# Patient Record
Sex: FEMALE | Race: BLACK OR AFRICAN AMERICAN | NOT HISPANIC OR LATINO
[De-identification: names, ages, dates, MRNs, and addresses within clinical notes are randomized per-mention and may not be internally consistent; named-entity substitution may affect disease eponyms.]

---

## 2017-03-09 ENCOUNTER — APPOINTMENT (OUTPATIENT)
Dept: OPHTHALMOLOGY | Facility: CLINIC | Age: 82
End: 2017-03-09

## 2017-09-28 ENCOUNTER — APPOINTMENT (OUTPATIENT)
Dept: OPHTHALMOLOGY | Facility: CLINIC | Age: 82
End: 2017-09-28
Payer: MEDICARE

## 2017-09-28 PROCEDURE — 92250 FUNDUS PHOTOGRAPHY W/I&R: CPT

## 2017-09-28 PROCEDURE — 92083 EXTENDED VISUAL FIELD XM: CPT

## 2017-09-28 PROCEDURE — 92014 COMPRE OPH EXAM EST PT 1/>: CPT

## 2017-09-28 PROCEDURE — 92226: CPT | Mod: LT

## 2017-09-28 PROCEDURE — 92020 GONIOSCOPY: CPT

## 2018-03-29 ENCOUNTER — APPOINTMENT (OUTPATIENT)
Dept: OPHTHALMOLOGY | Facility: CLINIC | Age: 83
End: 2018-03-29
Payer: MEDICARE

## 2018-03-29 DIAGNOSIS — H04.123 DRY EYE SYNDROME OF BILATERAL LACRIMAL GLANDS: ICD-10-CM

## 2018-03-29 PROCEDURE — 92012 INTRM OPH EXAM EST PATIENT: CPT

## 2018-03-29 PROCEDURE — 92133 CPTRZD OPH DX IMG PST SGM ON: CPT

## 2018-03-29 PROCEDURE — 92083 EXTENDED VISUAL FIELD XM: CPT

## 2018-09-27 ENCOUNTER — APPOINTMENT (OUTPATIENT)
Dept: OPHTHALMOLOGY | Facility: CLINIC | Age: 83
End: 2018-09-27
Payer: MEDICARE

## 2018-09-27 PROCEDURE — 92020 GONIOSCOPY: CPT

## 2018-09-27 PROCEDURE — 92250 FUNDUS PHOTOGRAPHY W/I&R: CPT

## 2018-09-27 PROCEDURE — 92014 COMPRE OPH EXAM EST PT 1/>: CPT

## 2018-09-27 PROCEDURE — 92226: CPT | Mod: RT

## 2018-09-27 PROCEDURE — 92083 EXTENDED VISUAL FIELD XM: CPT

## 2018-09-27 PROCEDURE — 92226: CPT | Mod: LT

## 2019-02-19 ENCOUNTER — EMERGENCY (EMERGENCY)
Facility: HOSPITAL | Age: 84
LOS: 1 days | Discharge: HOME | End: 2019-02-19
Admitting: EMERGENCY MEDICINE
Payer: MEDICARE

## 2019-02-19 VITALS
OXYGEN SATURATION: 96 % | TEMPERATURE: 98 F | WEIGHT: 115.08 LBS | SYSTOLIC BLOOD PRESSURE: 185 MMHG | DIASTOLIC BLOOD PRESSURE: 91 MMHG | RESPIRATION RATE: 16 BRPM | HEART RATE: 75 BPM

## 2019-02-19 VITALS — HEART RATE: 72 BPM | DIASTOLIC BLOOD PRESSURE: 96 MMHG | SYSTOLIC BLOOD PRESSURE: 228 MMHG

## 2019-02-19 PROCEDURE — 72125 CT NECK SPINE W/O DYE: CPT | Mod: 26

## 2019-02-19 PROCEDURE — 12011 RPR F/E/E/N/L/M 2.5 CM/<: CPT

## 2019-02-19 PROCEDURE — 70486 CT MAXILLOFACIAL W/O DYE: CPT | Mod: 26

## 2019-02-19 PROCEDURE — 99284 EMERGENCY DEPT VISIT MOD MDM: CPT | Mod: 25

## 2019-02-19 PROCEDURE — 70450 CT HEAD/BRAIN W/O DYE: CPT | Mod: 26

## 2019-02-19 RX ORDER — ACETAMINOPHEN 500 MG
650 TABLET ORAL ONCE
Qty: 0 | Refills: 0 | Status: COMPLETED | OUTPATIENT
Start: 2019-02-19 | End: 2019-02-19

## 2019-02-19 RX ORDER — CEFAZOLIN SODIUM 1 G
1000 VIAL (EA) INJECTION ONCE
Qty: 0 | Refills: 0 | Status: COMPLETED | OUTPATIENT
Start: 2019-02-19 | End: 2019-02-19

## 2019-02-19 RX ORDER — TETANUS TOXOID, REDUCED DIPHTHERIA TOXOID AND ACELLULAR PERTUSSIS VACCINE, ADSORBED 5; 2.5; 8; 8; 2.5 [IU]/.5ML; [IU]/.5ML; UG/.5ML; UG/.5ML; UG/.5ML
0.5 SUSPENSION INTRAMUSCULAR ONCE
Qty: 0 | Refills: 0 | Status: COMPLETED | OUTPATIENT
Start: 2019-02-19 | End: 2019-02-19

## 2019-02-19 RX ADMIN — Medication 100 MILLIGRAM(S): at 13:55

## 2019-02-19 RX ADMIN — TETANUS TOXOID, REDUCED DIPHTHERIA TOXOID AND ACELLULAR PERTUSSIS VACCINE, ADSORBED 0.5 MILLILITER(S): 5; 2.5; 8; 8; 2.5 SUSPENSION INTRAMUSCULAR at 13:55

## 2019-02-19 RX ADMIN — Medication 650 MILLIGRAM(S): at 14:34

## 2019-02-19 NOTE — ED PROVIDER NOTE - MUSCULOSKELETAL, MLM
Spine appears normal, range of motion is not limited, no muscle or joint tenderness Spine appears normal, range of motion is not limited, no muscle or joint tenderness. No midline tenderness, step off, or deformities.

## 2019-02-19 NOTE — ED PROVIDER NOTE - OBJECTIVE STATEMENT
82 y/o female with PMHx of HTN (taking atenolol) presents to the ED with complaints of laceration to lower lip s/p mechanical fall today. Pt states she was leaving the gym today when she stubbed her toe on the sidewalk and fell face-first. She is also c/o headache and jaw pain. She states she went to the dentist PTA and has a fracture to tooth #9. Tetanus is not UTD. She endorses seeing her PMD last week. No LOC, no head trauma, no visual changes, no vomiting, no neck pain, no back pain, no numbness/tinging/weakness.

## 2019-02-19 NOTE — ED PROCEDURE NOTE - CPROC ED LACER REPAIR DETAIL1
No foreign body small piece of tooth removed from lip./All foreign material was removed./The wound was explored to base in bloodless field.

## 2019-02-19 NOTE — ED PROVIDER NOTE - CHPI ED SYMPTOMS NEG
no LOC, no head trauma, no visual changes, no neck pain, no back pain, no numbness, no tingling, no weakness/no vomiting

## 2019-02-19 NOTE — ED PROVIDER NOTE - PROGRESS NOTE DETAILS
pt with elevated BP. denies chest pain, sob, palpitations, urinary symptoms, dizziness, vision changes. pt takes 50mg atenolol. recommend pt takes an extra dose tonight. pt has an appointment with her pmd tomorrow and will have her bp checked then.

## 2019-02-19 NOTE — ED PROVIDER NOTE - CLINICAL SUMMARY MEDICAL DECISION MAKING FREE TEXT BOX
Will obtain CT head, maxillofacial, and cervical spine. Will update tetanus and give Ancef as laceration is through and through. Will perform laceration repair.

## 2019-02-19 NOTE — ED PROVIDER NOTE - NEUROLOGICAL, MLM
Alert and oriented, no focal deficits, no motor or sensory deficits. Alert and oriented, no focal deficits, no motor or sensory deficits. Ambulatory.

## 2019-02-19 NOTE — ED ADULT NURSE NOTE - NSIMPLEMENTINTERV_GEN_ALL_ED
Implemented All Fall Risk Interventions:  [place] to call system. Call bell, personal items and telephone within reach. Instruct patient to call for assistance. Room bathroom lighting operational. Non-slip footwear when patient is off stretcher. Physically safe environment: no spills, clutter or unnecessary equipment. Stretcher in lowest position, wheels locked, appropriate side rails in place. Provide visual cue, wrist band, yellow gown, etc. Monitor gait and stability. Monitor for mental status changes and reorient to person, place, and time. Review medications for side effects contributing to fall risk. Reinforce activity limits and safety measures with patient and family.

## 2019-02-19 NOTE — ED PROVIDER NOTE - CARE PLAN
Principal Discharge DX:	Fall, initial encounter  Secondary Diagnosis:	Lip laceration, initial encounter

## 2019-02-19 NOTE — ED PROVIDER NOTE - SKIN, MLM
1.5cm laceration across lower lip, does not cross vermilion border. 1cm laceration beneath lower lip. Laceration is through and through.

## 2019-02-19 NOTE — ED PROVIDER NOTE - ENMT, MLM
Airway patent. Airway patent. Normal neck.   Head: Scalp without hematoma.   Jaw: UCF fracture to tooth #9. Normal bite strength. Normal ROM of jaw.

## 2019-02-19 NOTE — ED PROVIDER NOTE - NSFOLLOWUPINSTRUCTIONS_ED_ALL_ED_FT
FOLLOW UP WITH YOUR PMD IN 2-3 DAYS.     TAKE ANTIBIOTICS AS PRESCRIBED TO PREVENT INFECTION TO YOUR LIP.     KEEP WOUND CLEAN AND DRY. APPLY ANTIBIOTIC OINTMENT DAILY TO LIP. OKAY TO WASH FACE NORMALLY.     CAN USE COLD PACKS TO REDUCE SWELLING.     RETURN TO ER FOR FEVER, CHILLS, INCREASED SWELLING, PAIN, HEADACHE, VISION CHANGES, VOMITING, ANY OTHER NEW OR CONCERNING SYMPTOMS.     RETURN TO SUTURE REMOVAL TO CHIN IN 5-7 DAYS. THE SUTURES IN YOUR LIP SHOULD DISSOLVE IN 4-5 DAYS.     OKAY TO TAKE TYLENO 650MG EVERY 6 HOURS AS NEEDED FOR SORENESS. DO NOT TAKE IBUPROFEN OR NAPROXEN.

## 2019-02-19 NOTE — ED ADULT NURSE REASSESSMENT NOTE - NS ED NURSE REASSESS COMMENT FT1
pa aware of bp. plan is for pt to take extra dose of daily atenolol 50mg tonight and follow up with pmd tomorrow. pt denies Cp. dizziness. caregiver at bedside and aware as well.
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
## 2019-02-23 DIAGNOSIS — I10 ESSENTIAL (PRIMARY) HYPERTENSION: ICD-10-CM

## 2019-02-23 DIAGNOSIS — S01.511A LACERATION WITHOUT FOREIGN BODY OF LIP, INITIAL ENCOUNTER: ICD-10-CM
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
## 2019-02-23 DIAGNOSIS — S01.81XA LACERATION WITHOUT FOREIGN BODY OF OTHER PART OF HEAD, INITIAL ENCOUNTER: ICD-10-CM
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
## 2019-02-23 DIAGNOSIS — Z23 ENCOUNTER FOR IMMUNIZATION: ICD-10-CM

## 2019-02-25 ENCOUNTER — EMERGENCY (EMERGENCY)
Facility: HOSPITAL | Age: 84
LOS: 1 days | Discharge: HOME | End: 2019-02-25
Admitting: EMERGENCY MEDICINE

## 2019-02-25 VITALS
TEMPERATURE: 98 F | RESPIRATION RATE: 16 BRPM | HEART RATE: 58 BPM | OXYGEN SATURATION: 96 % | SYSTOLIC BLOOD PRESSURE: 151 MMHG | DIASTOLIC BLOOD PRESSURE: 80 MMHG
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
## 2019-02-25 DIAGNOSIS — Z48.02 ENCOUNTER FOR REMOVAL OF SUTURES: ICD-10-CM
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
## 2019-02-25 DIAGNOSIS — Z79.2 LONG TERM (CURRENT) USE OF ANTIBIOTICS: ICD-10-CM
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
## 2019-02-25 DIAGNOSIS — S01.81XD LACERATION WITHOUT FOREIGN BODY OF OTHER PART OF HEAD, SUBSEQUENT ENCOUNTER: ICD-10-CM
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
## 2019-02-25 DIAGNOSIS — I10 ESSENTIAL (PRIMARY) HYPERTENSION: ICD-10-CM

## 2019-02-25 NOTE — ED PROVIDER NOTE - CROS ED EYES ALL NEG
How Severe Is It?: moderate
Is This A New Presentation, Or A Follow-Up?: Follow Up Isotretinoin
When Was Your Last Visit?: 1/16/17
negative...

## 2019-02-25 NOTE — ED PROVIDER NOTE - SKIN, MLM
Skin normal color for race, warm, dry and intact. No evidence of rash. well healed wound with 3 sutures in place. swelling resolved. no discharge.

## 2019-02-25 NOTE — ED PROVIDER NOTE - OBJECTIVE STATEMENT
82yo F presents for suture removal. she had a fall a week ago and bit her lower lip. pt was treated with abx and wound was sutured. no fever, chills, discharge. healed well. no other complaints.

## 2019-02-25 NOTE — ED PROVIDER NOTE - NSFOLLOWUPINSTRUCTIONS_ED_ALL_ED_FT
FOLLOW UP WITH YOUR PMD AS NEEDED.     USE SUNSCREEN TO REDUCE SCARRING.     RETURN TO ER FOR ANY NEW OR CONCERNING SYMPTOMS.

## 2019-02-28 ENCOUNTER — APPOINTMENT (OUTPATIENT)
Dept: OPHTHALMOLOGY | Facility: CLINIC | Age: 84
End: 2019-02-28
Payer: MEDICARE

## 2019-02-28 DIAGNOSIS — H40.003 PREGLAUCOMA, UNSPECIFIED, BILATERAL: ICD-10-CM

## 2019-02-28 DIAGNOSIS — H35.30 UNSPECIFIED MACULAR DEGENERATION: ICD-10-CM

## 2019-02-28 PROCEDURE — 92133 CPTRZD OPH DX IMG PST SGM ON: CPT

## 2019-02-28 PROCEDURE — 92014 COMPRE OPH EXAM EST PT 1/>: CPT

## 2019-02-28 RX ORDER — OLOPATADINE HYDROCHLORIDE 2 MG/ML
0.2 SOLUTION OPHTHALMIC
Qty: 1 | Refills: 6 | Status: ACTIVE | COMMUNITY
Start: 2017-09-28 | End: 1900-01-01

## 2019-08-29 ENCOUNTER — APPOINTMENT (OUTPATIENT)
Dept: OPHTHALMOLOGY | Facility: CLINIC | Age: 84
End: 2019-08-29
Payer: MEDICARE

## 2019-08-29 ENCOUNTER — NON-APPOINTMENT (OUTPATIENT)
Age: 84
End: 2019-08-29

## 2019-08-29 PROCEDURE — 92250 FUNDUS PHOTOGRAPHY W/I&R: CPT

## 2019-08-29 PROCEDURE — 92014 COMPRE OPH EXAM EST PT 1/>: CPT

## 2019-08-29 PROCEDURE — 92083 EXTENDED VISUAL FIELD XM: CPT

## 2019-08-29 PROCEDURE — 92020 GONIOSCOPY: CPT

## 2020-02-20 ENCOUNTER — NON-APPOINTMENT (OUTPATIENT)
Age: 85
End: 2020-02-20

## 2020-02-20 ENCOUNTER — APPOINTMENT (OUTPATIENT)
Dept: OPHTHALMOLOGY | Facility: CLINIC | Age: 85
End: 2020-02-20
Payer: MEDICARE

## 2020-02-20 PROCEDURE — 92133 CPTRZD OPH DX IMG PST SGM ON: CPT

## 2020-02-20 PROCEDURE — 92014 COMPRE OPH EXAM EST PT 1/>: CPT

## 2020-02-20 PROCEDURE — 92083 EXTENDED VISUAL FIELD XM: CPT

## 2020-11-04 ENCOUNTER — NON-APPOINTMENT (OUTPATIENT)
Age: 85
End: 2020-11-04

## 2020-11-04 ENCOUNTER — APPOINTMENT (OUTPATIENT)
Dept: OPHTHALMOLOGY | Facility: CLINIC | Age: 85
End: 2020-11-04
Payer: MEDICARE

## 2020-11-04 PROCEDURE — 92250 FUNDUS PHOTOGRAPHY W/I&R: CPT

## 2020-11-04 PROCEDURE — 92014 COMPRE OPH EXAM EST PT 1/>: CPT

## 2020-11-04 PROCEDURE — 92020 GONIOSCOPY: CPT

## 2020-11-04 PROCEDURE — 92083 EXTENDED VISUAL FIELD XM: CPT

## 2021-04-07 ENCOUNTER — NON-APPOINTMENT (OUTPATIENT)
Age: 86
End: 2021-04-07

## 2021-04-07 ENCOUNTER — APPOINTMENT (OUTPATIENT)
Dept: OPHTHALMOLOGY | Facility: CLINIC | Age: 86
End: 2021-04-07
Payer: MEDICARE

## 2021-04-07 PROCEDURE — 92083 EXTENDED VISUAL FIELD XM: CPT

## 2021-04-07 PROCEDURE — 92133 CPTRZD OPH DX IMG PST SGM ON: CPT

## 2021-04-07 PROCEDURE — 99213 OFFICE O/P EST LOW 20 MIN: CPT

## 2021-09-30 ENCOUNTER — APPOINTMENT (OUTPATIENT)
Dept: OPHTHALMOLOGY | Facility: CLINIC | Age: 86
End: 2021-09-30
Payer: MEDICARE

## 2021-09-30 ENCOUNTER — NON-APPOINTMENT (OUTPATIENT)
Age: 86
End: 2021-09-30

## 2021-09-30 PROCEDURE — 99213 OFFICE O/P EST LOW 20 MIN: CPT

## 2021-09-30 PROCEDURE — 92250 FUNDUS PHOTOGRAPHY W/I&R: CPT

## 2021-10-20 ENCOUNTER — TRANSCRIPTION ENCOUNTER (OUTPATIENT)
Age: 86
End: 2021-10-20

## 2022-03-30 ENCOUNTER — NON-APPOINTMENT (OUTPATIENT)
Age: 87
End: 2022-03-30

## 2022-03-30 ENCOUNTER — APPOINTMENT (OUTPATIENT)
Dept: OPHTHALMOLOGY | Facility: CLINIC | Age: 87
End: 2022-03-30
Payer: MEDICARE

## 2022-03-30 PROCEDURE — 92083 EXTENDED VISUAL FIELD XM: CPT

## 2022-03-30 PROCEDURE — 99213 OFFICE O/P EST LOW 20 MIN: CPT

## 2022-03-30 PROCEDURE — 92133 CPTRZD OPH DX IMG PST SGM ON: CPT

## 2022-07-19 NOTE — ED PROVIDER NOTE - NSSUBSTANCEUSE_GEN_ALL_CORE_SD
never used Benzoyl Peroxide Counseling: Patient counseled that medicine may cause skin irritation and bleach clothing.  In the event of skin irritation, the patient was advised to reduce the amount of the drug applied or use it less frequently.   The patient verbalized understanding of the proper use and possible adverse effects of benzoyl peroxide.  All of the patient's questions and concerns were addressed.

## 2022-09-21 ENCOUNTER — APPOINTMENT (OUTPATIENT)
Dept: OPHTHALMOLOGY | Facility: CLINIC | Age: 87
End: 2022-09-21

## 2022-09-21 ENCOUNTER — NON-APPOINTMENT (OUTPATIENT)
Age: 87
End: 2022-09-21

## 2022-09-21 PROCEDURE — 92083 EXTENDED VISUAL FIELD XM: CPT

## 2022-09-21 PROCEDURE — 92020 GONIOSCOPY: CPT

## 2022-09-21 PROCEDURE — 92250 FUNDUS PHOTOGRAPHY W/I&R: CPT

## 2022-09-21 PROCEDURE — 92014 COMPRE OPH EXAM EST PT 1/>: CPT
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
## 2023-04-27 ENCOUNTER — APPOINTMENT (OUTPATIENT)
Dept: OPHTHALMOLOGY | Facility: CLINIC | Age: 88
End: 2023-04-27
Payer: MEDICARE

## 2023-04-27 ENCOUNTER — NON-APPOINTMENT (OUTPATIENT)
Age: 88
End: 2023-04-27

## 2023-04-27 PROCEDURE — 92083 EXTENDED VISUAL FIELD XM: CPT

## 2023-04-27 PROCEDURE — 92133 CPTRZD OPH DX IMG PST SGM ON: CPT

## 2023-04-27 PROCEDURE — 92012 INTRM OPH EXAM EST PATIENT: CPT

## 2023-05-06 ENCOUNTER — INPATIENT (INPATIENT)
Facility: HOSPITAL | Age: 88
LOS: 5 days | Discharge: HOME | DRG: 305 | End: 2023-05-12
Attending: PSYCHIATRY & NEUROLOGY | Admitting: STUDENT IN AN ORGANIZED HEALTH CARE EDUCATION/TRAINING PROGRAM
Payer: MEDICARE

## 2023-05-06 VITALS
TEMPERATURE: 97 F | SYSTOLIC BLOOD PRESSURE: 208 MMHG | HEART RATE: 55 BPM | RESPIRATION RATE: 18 BRPM | OXYGEN SATURATION: 95 % | DIASTOLIC BLOOD PRESSURE: 86 MMHG

## 2023-05-06 LAB
ALBUMIN SERPL ELPH-MCNC: 4 G/DL — SIGNIFICANT CHANGE UP (ref 3.4–5)
ALP SERPL-CCNC: 85 U/L — SIGNIFICANT CHANGE UP (ref 40–120)
ALT FLD-CCNC: 52 U/L — HIGH (ref 12–42)
ANION GAP SERPL CALC-SCNC: 8 MMOL/L — LOW (ref 9–16)
APTT BLD: 34.5 SEC — SIGNIFICANT CHANGE UP (ref 27.5–35.5)
AST SERPL-CCNC: 51 U/L — HIGH (ref 15–37)
BASOPHILS # BLD AUTO: 0.02 K/UL — SIGNIFICANT CHANGE UP (ref 0–0.2)
BASOPHILS NFR BLD AUTO: 0.6 % — SIGNIFICANT CHANGE UP (ref 0–2)
BILIRUB SERPL-MCNC: 0.3 MG/DL — SIGNIFICANT CHANGE UP (ref 0.2–1.2)
BUN SERPL-MCNC: 18 MG/DL — SIGNIFICANT CHANGE UP (ref 7–23)
CALCIUM SERPL-MCNC: 9.2 MG/DL — SIGNIFICANT CHANGE UP (ref 8.5–10.5)
CHLORIDE SERPL-SCNC: 98 MMOL/L — SIGNIFICANT CHANGE UP (ref 96–108)
CO2 SERPL-SCNC: 26 MMOL/L — SIGNIFICANT CHANGE UP (ref 22–31)
CREAT SERPL-MCNC: 0.66 MG/DL — SIGNIFICANT CHANGE UP (ref 0.5–1.3)
EGFR: 85 ML/MIN/1.73M2 — SIGNIFICANT CHANGE UP
EOSINOPHIL # BLD AUTO: 0.03 K/UL — SIGNIFICANT CHANGE UP (ref 0–0.5)
EOSINOPHIL NFR BLD AUTO: 0.8 % — SIGNIFICANT CHANGE UP (ref 0–6)
GLUCOSE SERPL-MCNC: 128 MG/DL — HIGH (ref 70–99)
HCT VFR BLD CALC: 40.1 % — SIGNIFICANT CHANGE UP (ref 34.5–45)
HGB BLD-MCNC: 13.4 G/DL — SIGNIFICANT CHANGE UP (ref 11.5–15.5)
IMM GRANULOCYTES NFR BLD AUTO: 0.6 % — SIGNIFICANT CHANGE UP (ref 0–0.9)
INR BLD: 1.06 — SIGNIFICANT CHANGE UP (ref 0.88–1.16)
LYMPHOCYTES # BLD AUTO: 1.55 K/UL — SIGNIFICANT CHANGE UP (ref 1–3.3)
LYMPHOCYTES # BLD AUTO: 43.2 % — SIGNIFICANT CHANGE UP (ref 13–44)
MCHC RBC-ENTMCNC: 30 PG — SIGNIFICANT CHANGE UP (ref 27–34)
MCHC RBC-ENTMCNC: 33.4 GM/DL — SIGNIFICANT CHANGE UP (ref 32–36)
MCV RBC AUTO: 89.9 FL — SIGNIFICANT CHANGE UP (ref 80–100)
MONOCYTES # BLD AUTO: 0.37 K/UL — SIGNIFICANT CHANGE UP (ref 0–0.9)
MONOCYTES NFR BLD AUTO: 10.3 % — SIGNIFICANT CHANGE UP (ref 2–14)
NEUTROPHILS # BLD AUTO: 1.6 K/UL — LOW (ref 1.8–7.4)
NEUTROPHILS NFR BLD AUTO: 44.5 % — SIGNIFICANT CHANGE UP (ref 43–77)
NRBC # BLD: 0 /100 WBCS — SIGNIFICANT CHANGE UP (ref 0–0)
PLATELET # BLD AUTO: 198 K/UL — SIGNIFICANT CHANGE UP (ref 150–400)
POTASSIUM SERPL-MCNC: 4.3 MMOL/L — SIGNIFICANT CHANGE UP (ref 3.5–5.3)
POTASSIUM SERPL-SCNC: 4.3 MMOL/L — SIGNIFICANT CHANGE UP (ref 3.5–5.3)
PROT SERPL-MCNC: 7.4 G/DL — SIGNIFICANT CHANGE UP (ref 6.4–8.2)
PROTHROM AB SERPL-ACNC: 12.4 SEC — SIGNIFICANT CHANGE UP (ref 10.5–13.4)
RBC # BLD: 4.46 M/UL — SIGNIFICANT CHANGE UP (ref 3.8–5.2)
RBC # FLD: 14.3 % — SIGNIFICANT CHANGE UP (ref 10.3–14.5)
SODIUM SERPL-SCNC: 132 MMOL/L — SIGNIFICANT CHANGE UP (ref 132–145)
TROPONIN I, HIGH SENSITIVITY RESULT: 14.6 NG/L — SIGNIFICANT CHANGE UP
WBC # BLD: 3.59 K/UL — LOW (ref 3.8–10.5)
WBC # FLD AUTO: 3.59 K/UL — LOW (ref 3.8–10.5)

## 2023-05-06 PROCEDURE — 0042T: CPT

## 2023-05-06 PROCEDURE — 70498 CT ANGIOGRAPHY NECK: CPT | Mod: 26,MH

## 2023-05-06 PROCEDURE — 70496 CT ANGIOGRAPHY HEAD: CPT | Mod: 26,MH

## 2023-05-06 PROCEDURE — 99285 EMERGENCY DEPT VISIT HI MDM: CPT

## 2023-05-06 RX ORDER — HYDRALAZINE HCL 50 MG
10 TABLET ORAL ONCE
Refills: 0 | Status: COMPLETED | OUTPATIENT
Start: 2023-05-06 | End: 2023-05-06

## 2023-05-06 RX ORDER — ACETAMINOPHEN 500 MG
1000 TABLET ORAL ONCE
Refills: 0 | Status: COMPLETED | OUTPATIENT
Start: 2023-05-06 | End: 2023-05-06

## 2023-05-06 RX ORDER — ACETAMINOPHEN 500 MG
650 TABLET ORAL ONCE
Refills: 0 | Status: COMPLETED | OUTPATIENT
Start: 2023-05-06 | End: 2023-05-06

## 2023-05-06 RX ORDER — CLOPIDOGREL BISULFATE 75 MG/1
75 TABLET, FILM COATED ORAL DAILY
Refills: 0 | Status: DISCONTINUED | OUTPATIENT
Start: 2023-05-06 | End: 2023-05-07

## 2023-05-06 RX ORDER — ASPIRIN/CALCIUM CARB/MAGNESIUM 324 MG
81 TABLET ORAL DAILY
Refills: 0 | Status: DISCONTINUED | OUTPATIENT
Start: 2023-05-06 | End: 2023-05-12

## 2023-05-06 RX ORDER — ACETAMINOPHEN 500 MG
650 TABLET ORAL EVERY 6 HOURS
Refills: 0 | Status: DISCONTINUED | OUTPATIENT
Start: 2023-05-06 | End: 2023-05-12

## 2023-05-06 RX ORDER — KETOTIFEN FUMARATE 0.34 MG/ML
1 SOLUTION OPHTHALMIC
Refills: 0 | DISCHARGE

## 2023-05-06 RX ORDER — KETOTIFEN FUMARATE 0.34 MG/ML
1 SOLUTION OPHTHALMIC DAILY
Refills: 0 | Status: DISCONTINUED | OUTPATIENT
Start: 2023-05-06 | End: 2023-05-12

## 2023-05-06 RX ORDER — METOCLOPRAMIDE HCL 10 MG
10 TABLET ORAL ONCE
Refills: 0 | Status: COMPLETED | OUTPATIENT
Start: 2023-05-06 | End: 2023-05-06

## 2023-05-06 RX ORDER — LABETALOL HCL 100 MG
10 TABLET ORAL ONCE
Refills: 0 | Status: COMPLETED | OUTPATIENT
Start: 2023-05-06 | End: 2023-05-06

## 2023-05-06 RX ORDER — ATORVASTATIN CALCIUM 80 MG/1
80 TABLET, FILM COATED ORAL AT BEDTIME
Refills: 0 | Status: DISCONTINUED | OUTPATIENT
Start: 2023-05-06 | End: 2023-05-11

## 2023-05-06 RX ADMIN — Medication 650 MILLIGRAM(S): at 22:36

## 2023-05-06 RX ADMIN — Medication 104 MILLIGRAM(S): at 13:22

## 2023-05-06 RX ADMIN — Medication 650 MILLIGRAM(S): at 21:24

## 2023-05-06 RX ADMIN — Medication 10 MILLIGRAM(S): at 21:05

## 2023-05-06 RX ADMIN — Medication 10 MILLIGRAM(S): at 13:22

## 2023-05-06 RX ADMIN — ATORVASTATIN CALCIUM 80 MILLIGRAM(S): 80 TABLET, FILM COATED ORAL at 21:09

## 2023-05-06 RX ADMIN — CLOPIDOGREL BISULFATE 75 MILLIGRAM(S): 75 TABLET, FILM COATED ORAL at 21:07

## 2023-05-06 RX ADMIN — Medication 650 MILLIGRAM(S): at 14:07

## 2023-05-06 RX ADMIN — Medication 81 MILLIGRAM(S): at 21:09

## 2023-05-06 RX ADMIN — Medication 10 MILLIGRAM(S): at 14:07

## 2023-05-06 NOTE — ED ADULT TRIAGE NOTE - CHIEF COMPLAINT QUOTE
Pt complaining of dizziness since this morning at 9:30. Pt daughter states that she is also confused which is new for pt. STROKE CODE CALLED IN TRIAGE.

## 2023-05-06 NOTE — ED ADULT NURSE NOTE - OBJECTIVE STATEMENT
pt c/o dizziness and confusion starting at 9:30am today but daughter states "mental slowness" started last night (LKW). pt denies vision changes, numbness/tingling, weakness, difficulty speaking or ambulating. BEFAST negative. resp even and unlabored, no distress, a+ox4.

## 2023-05-06 NOTE — H&P ADULT - HISTORY OF PRESENT ILLNESS
**STROKE HPI***    HPI: 87y Female with PMHx of HTN, arthritis presented to [facility] today after sudden onset of dizziness that began this morning at 0930. Patient was in her usual state of health this morning and had a sudden onset of headache with dizziness and unsteadiness while she was in the bathroom. Her daughter, who the patient lives with, saw that she was feeling unwell and helped her to bed and gave her Tylenol and water. The patient did not experience any room spinning, but felt like she was going to fall. No reported falls or LOC. Symptoms improved after the patient laid down but they did not go away. Symptoms worsen with movement. NIHSS 0 at [facility]. CTH negative for any acute intracranial pathology. CTA negative for high grade stenosis or LVO. CTP with right cerebellar and bilateral posterior cerebral Tmax elevation, but delayed bolus curves. Patient found to be hypertensive with SBP in the 200's, given IV labetalol and hydralazine. Patient reports that her BP is normally a bit on the higher side but being in the 200's is very abnormal for her. She reports that she is compliant with her BP medication and took it this AM. Patient transferred to [facility] for further workup. NIHSS 0 on exam found to have slight pronation of RUE b/l upper extremities 5/5. BP elevated to 190's on arrival and sustained, given 10mg labetalol IVP.     PAST MEDICAL & SURGICAL HISTORY:  HTN (hypertension)      No significant past surgical history          FAMILY HISTORY:            T(C): 36.3 (05-06-23 @ 17:31), Max: 36.4 (05-06-23 @ 13:16)  HR: 75 (05-06-23 @ 20:00) (53 - 75)  BP: 189/79 (05-06-23 @ 20:00) (140/66 - 208/89)  RR: 20 (05-06-23 @ 20:00) (16 - 20)  SpO2: 99% (05-06-23 @ 20:00) (95% - 99%)    MEDICATION RECONCILIATION   MEDICATIONS  (STANDING):  aspirin enteric coated 81 milliGRAM(s) Oral daily  atorvastatin 80 milliGRAM(s) Oral at bedtime  clopidogrel Tablet 75 milliGRAM(s) Oral daily  labetalol Injectable 10 milliGRAM(s) IV Push once    MEDICATIONS  (PRN):    Allergies    No Known Allergies    Intolerances      Vital Signs Last 24 Hrs  T(C): 36.3 (06 May 2023 17:31), Max: 36.4 (06 May 2023 13:16)  T(F): 97.3 (06 May 2023 17:31), Max: 97.5 (06 May 2023 13:16)  HR: 75 (06 May 2023 20:00) (53 - 75)  BP: 189/79 (06 May 2023 20:00) (140/66 - 208/89)  BP(mean): 114 (06 May 2023 20:00) (114 - 124)  RR: 20 (06 May 2023 20:00) (16 - 20)  SpO2: 99% (06 May 2023 20:00) (95% - 99%)    Parameters below as of 06 May 2023 20:00  Patient On (Oxygen Delivery Method): room air

## 2023-05-06 NOTE — H&P ADULT - NSHPSOCIALHISTORY_GEN_ALL_CORE
SOCIAL HISTORY:   Patient lives with daughter at home.   Smoking status: Denies  Drinking: Half glass of wine with dinner  Drug Use: Denies

## 2023-05-06 NOTE — H&P ADULT - NSHPLABSRESULTS_GEN_ALL_CORE
Fingerstick Blood Glucose: CAPILLARY BLOOD GLUCOSE      POCT Blood Glucose.: 103 mg/dL (06 May 2023 11:26)    LABS:                        13.4   3.59  )-----------( 198      ( 06 May 2023 11:24 )             40.1     05-06    132  |  98  |  18  ----------------------------<  128<H>  4.3   |  26  |  0.66    Ca    9.2      06 May 2023 11:24    TPro  7.4  /  Alb  4.0  /  TBili  0.3  /  DBili  x   /  AST  51<H>  /  ALT  52<H>  /  AlkPhos  85  05-06    PT/INR - ( 06 May 2023 11:24 )   PT: 12.4 sec;   INR: 1.06          PTT - ( 06 May 2023 11:24 )  PTT:34.5 sec          RADIOLOGY & ADDITIONAL STUDIIES    CT Brain Stroke Protocol (05.06.23 @ 11:31)     IMPRESSION:  No acute intracranial hemorrhage, large demarcated territorial   infarction, or mass effect.      CT Angio Neck Stroke Protocol w/ IV Cont (05.06.23 @ 12:00)     IMPRESSION:    Intracranial CTA: No small artery occlusion or high-grade stenosis.   Vertebral basilar system widely patent. Scattered sites of mild   atherosclerotic stenosis.    Extracranial CTA: No arterial high-grade stenosis, occlusive disease or   evidence of dissection. Moderate calcified plaque at each carotid   bifurcation.    CT Brain Perfusion Maps Stroke (05.06.23 @ 11:59)     FINDINGS:    There are bilateral sites of Tmax elevation reported in the posterior   cerebral hemispheres and in the right cerebellum. Bolus curves are   delayed which may limit reliability of the reported data. No cerebral   blood flow or volume deficit reported.

## 2023-05-06 NOTE — ED PROVIDER NOTE - PHYSICAL EXAMINATION
General:  Well appearing, no distress  HEENT:  No conjunctival injection, neck supple, no congestion   Chest:  Non-tender, no crepitance  Lungs:  Clear to auscultation bilaterally   Heart:  s1s2 normal, no murmur  Abdomen:  soft, non-tender, non-distended  :  Deferred  Rectal:  Deferred  Extremities: No edema, normal perfusion, no joint swelling or tenderness  Neuro:  Alert, conversant, motor/sensory grossly intact   Psychiatry:  Calm, cooperative, no expression of suicidal or homicidal ideation

## 2023-05-06 NOTE — PATIENT PROFILE ADULT - FALL HARM RISK - HARM RISK INTERVENTIONS

## 2023-05-06 NOTE — H&P ADULT - ASSESSMENT
87y Female with PMHx of HTN, arthritis transferred from [facility] to [facility] today after sudden onset of headache, dizziness, and unsteady gait that began this morning at 0930. NIHSS 0 on arrival to [facility]. CTH negative for any acute intracranial pathology. CTA negative for high grade stenosis or LVO. CTP with right cerebellar and bilateral posterior cerebral Tmax elevation, but delayed bolus curves. Not a candidate for any intervention. Hypertensive to the 200's treated with IV pushes. Transferred to [facility] for further workup and stroke rule out.     Neuro  #CVA workup  - start aspirin 81mg and plavix 75mg daily  - start atorvastatin 80mg daily  - q4hr stroke neuro checks and vitals  - obtain MRI Brain without contrast  - Stroke Code HCT Results: Negative  - Stroke Code CTA Results: No LVO or HGS. Moderate calcified plaque at each carotid bifurcation. Scattered sites of mild atherosclerotic stenosis.  - Stroke education    Cards  #HTN  - permissive hypertension, Goal -180  - hold home blood pressure medication for now (Valsartan 80mg, Atenolol 50mg)  - obtain TTE   - Stroke Code EKG Results: NSR    #HLD  - high dose statin as above in CVA  - LDL results: pending    Pulm  - call provider if SPO2 < 94%    GI  #Nutrition/Fluids/Electrolytes   - replete K<4 and Mg <2  - Diet: DASH/TLC    Renal  - Patient reports some urinary frequency at night    Infectious Disease  - Afebrile, no leukocytosis     Endocrine  - A1C results:     - TSH results:    DVT Prophylaxis  - lovenox sq for DVT prophylaxis   - SCDs for DVT prophylaxis       IDR Goals: Goals reviewed at interdisciplinary rounds with case management, social work, physical therapy, occupational therapy, and speech language pathology.   Please see specific therapy  notes for in depth goals.  Dispo: Pending PT/OT eval     Discussed daily hospital plans and goals with patient and family at bedside.     Discussed with Neurology Attending Dr. Yuridia Linder 87y Female with PMHx of HTN, arthritis transferred from [facility] to [facility] today after sudden onset of headache, dizziness, and unsteady gait that began this morning at 0930. NIHSS 0 on arrival to [facility]. CTH negative for any acute intracranial pathology. CTA negative for high grade stenosis or LVO. CTP with right cerebellar and bilateral posterior cerebral Tmax elevation, but delayed bolus curves. Not a candidate for any intervention. Hypertensive to the 200's treated with IV pushes. Transferred to [facility] for further workup and stroke rule out.     Neuro  #CVA workup  - start aspirin 81mg and plavix 75mg daily  - start atorvastatin 80mg daily  - q4hr stroke neuro checks and vitals  - obtain MRI Brain without contrast  - Stroke Code HCT Results: Negative  - Stroke Code CTA Results: No LVO or HGS. Moderate calcified plaque at each carotid bifurcation. Scattered sites of mild atherosclerotic stenosis.  - Stroke education    Cards  #HTN  - permissive hypertension, Goal -180  - hold home blood pressure medication for now (Valsartan 80mg, Atenolol 50mg)  - Labetalol IVP for SBP > 180  - obtain TTE   - Stroke Code EKG Results: NSR    #HLD  - high dose statin as above in CVA  - LDL results: pending    Pulm  - call provider if SPO2 < 94%    GI  #Nutrition/Fluids/Electrolytes   - replete K<4 and Mg <2  - Diet: DASH/TLC    Renal  - Patient reports some urinary frequency at night    Infectious Disease  - Afebrile, no leukocytosis     Endocrine  - A1C results: pending    - TSH results: pending    DVT Prophylaxis  - lovenox sq for DVT prophylaxis   - SCDs for DVT prophylaxis       IDR Goals: Goals reviewed at interdisciplinary rounds with case management, social work, physical therapy, occupational therapy, and speech language pathology.   Please see specific therapy  notes for in depth goals.  Dispo: Pending PT/OT eval     Discussed daily hospital plans and goals with patient and family at bedside.     Discussed with Neurology Attending Dr. Yuridia Linder 87y Female with PMHx of HTN, arthritis transferred from [facility] to [facility] today after sudden onset of headache, dizziness, and unsteady gait that began this morning at 0930. NIHSS 0 on arrival to [facility]. CTH negative for any acute intracranial pathology. CTA negative for high grade stenosis or LVO. CTP with right cerebellar and bilateral posterior cerebral Tmax elevation, but delayed bolus curves. Not a candidate for any intervention. Hypertensive to the 200's treated with IV pushes. Transferred to [facility] for further workup and stroke rule out.     Neuro  #CVA workup  - start aspirin 81mg and plavix 75mg daily  - start atorvastatin 80mg daily  - q4hr stroke neuro checks and vitals  - obtain MRI Brain without contrast  - Stroke Code HCT Results: Negative  - Stroke Code CTA Results: No LVO or HGS. Moderate calcified plaque at each carotid bifurcation. Scattered sites of mild atherosclerotic stenosis.  - Stroke education    Cards  #HTN  - permissive hypertension, Goal -180  - hold home blood pressure medication for now (Valsartan 80mg, Atenolol 50mg)  - Labetalol IVP for SBP > 180  - obtain TTE   - Stroke Code EKG Results: NSR    #HLD  - high dose statin as above in CVA  - LDL results: pending    Pulm  - call provider if SPO2 < 94%    GI  #Nutrition/Fluids/Electrolytes   - replete K<4 and Mg <2  - Diet: DASH/TLC    Renal  - Patient reports some urinary frequency at night    Infectious Disease  - Afebrile, no leukocytosis     Endocrine  - A1C results: pending    - TSH results: pending    ENT   #seasonal allergies  - Continue home artificial tears    DVT Prophylaxis  - lovenox sq for DVT prophylaxis   - SCDs for DVT prophylaxis       IDR Goals: Goals reviewed at interdisciplinary rounds with case management, social work, physical therapy, occupational therapy, and speech language pathology.   Please see specific therapy  notes for in depth goals.  Dispo: Pending PT/OT eval     Discussed daily hospital plans and goals with patient and family at bedside.     Discussed with Neurology Attending Dr. Yuridia Linder 87y Female with PMHx of HTN, arthritis transferred from [facility] to [facility] today after sudden onset of headache, dizziness, and unsteady gait that began this morning at 0930. NIHSS 0 on arrival to [facility]. CTH negative for any acute intracranial pathology. CTA negative for high grade stenosis or LVO. CTP with right cerebellar and bilateral posterior cerebral Tmax elevation, but delayed bolus curves. Not a candidate for any intervention. Hypertensive to the 200's treated with IV pushes. Transferred to [facility] for further workup and stroke rule out, mild right upper extremity pronation noted on exam.      Neuro  #CVA workup  - start aspirin 81mg and plavix 75mg daily  - start atorvastatin 80mg daily  - q4hr stroke neuro checks and vitals  - obtain MRI Brain without contrast  - Stroke Code HCT Results: Negative  - Stroke Code CTA Results: No LVO or HGS. Moderate calcified plaque at each carotid bifurcation. Scattered sites of mild atherosclerotic stenosis.  - Stroke education    Cards  #HTN  - permissive hypertension, Goal -180  - hold home blood pressure medication for now (Valsartan 80mg, Atenolol 50mg)  - Labetalol IVP for SBP > 180  - obtain TTE   - Stroke Code EKG Results: NSR    #HLD  - high dose statin as above in CVA  - LDL results: pending    Pulm  - call provider if SPO2 < 94%    GI  #Nutrition/Fluids/Electrolytes   - replete K<4 and Mg <2  - Diet: DASH/TLC    Renal  - Patient reports some urinary frequency at night    Infectious Disease  - Afebrile, no leukocytosis     Endocrine  - A1C results: pending    - TSH results: pending    ENT   #seasonal allergies  - Continue home artificial tears    DVT Prophylaxis  - lovenox sq for DVT prophylaxis   - SCDs for DVT prophylaxis       IDR Goals: Goals reviewed at interdisciplinary rounds with case management, social work, physical therapy, occupational therapy, and speech language pathology.   Please see specific therapy  notes for in depth goals.  Dispo: Pending PT/OT eval     Discussed daily hospital plans and goals with patient and family at bedside.     Discussed with Neurology Attending Dr. Yuridia Linder

## 2023-05-06 NOTE — H&P ADULT - NSHPPHYSICALEXAM_GEN_ALL_CORE
Physical exam:  General: No acute distress, awake and alert  Cardiovascular: Regular rate and rhythm on monitor, no murmurs, rubs, or gallops. No bruits  Pulmonary: Anterior breath sounds clear bilaterally, no crackles or wheezing. No use of accessory muscles  GI: Abdomen soft, non-tender, non-distended    Neurologic:  -Mental status: Awake, alert, oriented to person, place, and time. Speech is fluent with intact naming, repetition, and comprehension, no dysarthria. Recent and remote memory intact. Follows commands. Attention/concentration intact. Fund of knowledge appropriate.  -Cranial nerves:   II: Visual fields are full to confrontation.  III, IV, VI: Extraocular movements are intact without nystagmus. Pupils equally round and reactive to light  V:  Facial sensation V1-V3 equal and intact   VII: Face is symmetric with normal smile  XII: Tongue protrudes midline  Motor: Normal bulk and tone. No drift, slight pronation of RUE. Strength bilateral upper extremity 5/5, bilateral lower extremities 5/5.  Rapid alternating movements intact and symmetric  Sensation: Intact to light touch bilaterally. No neglect or extinction on double simultaneous testing.  Coordination: No dysmetria on finger-to-nose bilaterally  Reflexes: Downgoing toes bilaterally   Gait: Narrow gait and steady    NIHSS: 0

## 2023-05-06 NOTE — ED PROVIDER NOTE - CLINICAL SUMMARY MEDICAL DECISION MAKING FREE TEXT BOX
86 yo F with ha, dizziness, confusion, elevated bp.  Concern for ICH and so code stroke initiated.  Patient seen in CT.  Other diagnostic possibilities include: cva, hypertensive encephalopathy    1138:  Case d/w YORDAN Spears who is covering attending [place].  Agrees not a tenecteplase candidate, unlikely LVO in light of exam.  Will update after imaging.  If no acute interventions indicated, will then discuss with non-stroke neuro team. 86 yo F with ha, dizziness, confusion, elevated bp.  Concern for ICH and so code stroke initiated.  Patient seen in CT.  Other diagnostic possibilities include: cva, hypertensive encephalopathy    1138:  Case d/w YORDAN Spears who is covering attending [place].  Agrees not a tenecteplase candidate, unlikely LVO in light of exam.  Will update after imaging.  If no acute interventions indicated, will then discuss with non-stroke neuro team.    15:40:  Patient remains comfortable.  Mild residual headache.  No focal deficits.  Advise that she will be admitted for MRI/BP monitoring.  Patient agrees with plan.  Case has been discussed in detail with Stroke Attending Kailash who accepted patient for admission.

## 2023-05-06 NOTE — ED PROVIDER NOTE - OBJECTIVE STATEMENT
88 yo F accompanied by her daughter for evaluation of confusion and dizziness  According to the daughter, the patient was more fatigued than usual yesterday  This morning at 0730 she was making tea and dropped the top of the teapot which was unusual for her  At 0930, the patient complained of ha, dizziness and was mildly confused.    No slurred speech, no problems walking, no change in vision, no numbness, tingling or weakness to face, arms legs  Daughter thinks she took her BP meds today    PMH: HTN   MEDS: ?LOSARTAN  ALL: NKDA  SOC: NO TOBACCO

## 2023-05-07 LAB
A1C WITH ESTIMATED AVERAGE GLUCOSE RESULT: 5.6 % — SIGNIFICANT CHANGE UP (ref 4–5.6)
ANION GAP SERPL CALC-SCNC: 11 MMOL/L — SIGNIFICANT CHANGE UP (ref 5–17)
BUN SERPL-MCNC: 13 MG/DL — SIGNIFICANT CHANGE UP (ref 7–23)
CALCIUM SERPL-MCNC: 9.1 MG/DL — SIGNIFICANT CHANGE UP (ref 8.4–10.5)
CHLORIDE SERPL-SCNC: 100 MMOL/L — SIGNIFICANT CHANGE UP (ref 96–108)
CHOLEST SERPL-MCNC: 275 MG/DL — HIGH
CO2 SERPL-SCNC: 22 MMOL/L — SIGNIFICANT CHANGE UP (ref 22–31)
CREAT SERPL-MCNC: 0.58 MG/DL — SIGNIFICANT CHANGE UP (ref 0.5–1.3)
EGFR: 88 ML/MIN/1.73M2 — SIGNIFICANT CHANGE UP
ESTIMATED AVERAGE GLUCOSE: 114 MG/DL — SIGNIFICANT CHANGE UP (ref 68–114)
GLUCOSE SERPL-MCNC: 102 MG/DL — HIGH (ref 70–99)
HCT VFR BLD CALC: 41 % — SIGNIFICANT CHANGE UP (ref 34.5–45)
HDLC SERPL-MCNC: 96 MG/DL — SIGNIFICANT CHANGE UP
HGB BLD-MCNC: 13.9 G/DL — SIGNIFICANT CHANGE UP (ref 11.5–15.5)
LIPID PNL WITH DIRECT LDL SERPL: 167 MG/DL — HIGH
MAGNESIUM SERPL-MCNC: 2.2 MG/DL — SIGNIFICANT CHANGE UP (ref 1.6–2.6)
MCHC RBC-ENTMCNC: 30.3 PG — SIGNIFICANT CHANGE UP (ref 27–34)
MCHC RBC-ENTMCNC: 33.9 GM/DL — SIGNIFICANT CHANGE UP (ref 32–36)
MCV RBC AUTO: 89.3 FL — SIGNIFICANT CHANGE UP (ref 80–100)
NON HDL CHOLESTEROL: 179 MG/DL — HIGH
NRBC # BLD: 0 /100 WBCS — SIGNIFICANT CHANGE UP (ref 0–0)
PHOSPHATE SERPL-MCNC: 3.8 MG/DL — SIGNIFICANT CHANGE UP (ref 2.5–4.5)
PLATELET # BLD AUTO: 222 K/UL — SIGNIFICANT CHANGE UP (ref 150–400)
POTASSIUM SERPL-MCNC: 4.2 MMOL/L — SIGNIFICANT CHANGE UP (ref 3.5–5.3)
POTASSIUM SERPL-SCNC: 4.2 MMOL/L — SIGNIFICANT CHANGE UP (ref 3.5–5.3)
RBC # BLD: 4.59 M/UL — SIGNIFICANT CHANGE UP (ref 3.8–5.2)
RBC # FLD: 14.8 % — HIGH (ref 10.3–14.5)
SODIUM SERPL-SCNC: 133 MMOL/L — LOW (ref 135–145)
TRIGL SERPL-MCNC: 59 MG/DL — SIGNIFICANT CHANGE UP
TSH SERPL-MCNC: 2.01 UIU/ML — SIGNIFICANT CHANGE UP (ref 0.27–4.2)
WBC # BLD: 3.46 K/UL — LOW (ref 3.8–10.5)
WBC # FLD AUTO: 3.46 K/UL — LOW (ref 3.8–10.5)

## 2023-05-07 PROCEDURE — 99221 1ST HOSP IP/OBS SF/LOW 40: CPT

## 2023-05-07 PROCEDURE — 70551 MRI BRAIN STEM W/O DYE: CPT | Mod: 26

## 2023-05-07 RX ORDER — ATENOLOL 25 MG/1
25 TABLET ORAL DAILY
Refills: 0 | Status: DISCONTINUED | OUTPATIENT
Start: 2023-05-07 | End: 2023-05-08

## 2023-05-07 RX ORDER — ENOXAPARIN SODIUM 100 MG/ML
40 INJECTION SUBCUTANEOUS EVERY 24 HOURS
Refills: 0 | Status: DISCONTINUED | OUTPATIENT
Start: 2023-05-07 | End: 2023-05-12

## 2023-05-07 RX ADMIN — Medication 81 MILLIGRAM(S): at 12:01

## 2023-05-07 RX ADMIN — ENOXAPARIN SODIUM 40 MILLIGRAM(S): 100 INJECTION SUBCUTANEOUS at 06:14

## 2023-05-07 RX ADMIN — ATENOLOL 25 MILLIGRAM(S): 25 TABLET ORAL at 16:01

## 2023-05-07 RX ADMIN — Medication 1 TABLET(S): at 12:01

## 2023-05-07 RX ADMIN — Medication 650 MILLIGRAM(S): at 09:28

## 2023-05-07 RX ADMIN — KETOTIFEN FUMARATE 1 DROP(S): 0.34 SOLUTION OPHTHALMIC at 12:09

## 2023-05-07 RX ADMIN — Medication 650 MILLIGRAM(S): at 10:30

## 2023-05-07 RX ADMIN — CLOPIDOGREL BISULFATE 75 MILLIGRAM(S): 75 TABLET, FILM COATED ORAL at 12:01

## 2023-05-07 RX ADMIN — ATORVASTATIN CALCIUM 80 MILLIGRAM(S): 80 TABLET, FILM COATED ORAL at 21:11

## 2023-05-07 NOTE — OCCUPATIONAL THERAPY INITIAL EVALUATION ADULT - DIAGNOSIS, OT EVAL
Pt. admitted to [facility] for further workup after presenting w. dizziness and found hypertensive. Upon assessment, pt. demo slight impairments in strength, activity tolerance, balance and postural control

## 2023-05-07 NOTE — CONSULT NOTE ADULT - ASSESSMENT
87F w HTN, arthritis p/w dizziness, NIHSS 0 at [facility], imaging notable for CTP showing R cerebellar an db/l posterior cerebral TMax elevation, SBP in 200s admitted for further eval    #CVA/TIA eval   - A1C, LDL, TSH pending   - on DAPT, Atorva 80  #Hypertensive urgency. Home on atenolol 50, losartan 80. BP 130s     Recommend  f/u MR head, TTE, PT OT  BP Target per stroke-neuro  INCOMPLETE PCP: Timothy Guthrie  87F w HTN, arthritis p/w dizziness, NIHSS 0 at [facility], imaging notable for CTP showing R cerebellar an db/l posterior cerebral TMax elevation, SBP in 200s admitted for further eval    #CVA/TIA eval   - A1C borderline 5.6, , TSH nml 2.01   - on DAPT, Atorva 80    #HLD - started on atorva 80 HS  #Hypertensive urgency. -170s. Home on atenolol 50, losartan 80.     Recommend  Overall, sxs appears to have improved significantly  f/u MR head, TTE,  BP Target per stroke-neuro    DISPO: PT Recommend HPT pending above.

## 2023-05-07 NOTE — PHYSICAL THERAPY INITIAL EVALUATION ADULT - MODALITIES TREATMENT COMMENTS
All CN testing symmetrical; III, IV, VI: Extraocular movements intact without nystagmus, VII: Face is symmetric with normal eye close and smile, VIII: Hearing is grossly intact, XI: head turning and shoulder shrug intact b/l XII: Tongue protrudes midline. Quadrant test; symmetrical with no deficits. Visual tracking/scanning intact.

## 2023-05-07 NOTE — OCCUPATIONAL THERAPY INITIAL EVALUATION ADULT - ADDITIONAL COMMENTS
Per pt., she lives with her daughter in an elevator accessible apartment. She was I prior in ADLs and functional mob/transfers. Her daughter is out all day and works full time. BR with walk in shower and bench. Pt. is ELVIE ortega

## 2023-05-07 NOTE — OCCUPATIONAL THERAPY INITIAL EVALUATION ADULT - MODIFIED CLINICAL TEST OF SENSORY INTEGRATION IN BALANCE TEST
Pt. performed functional mob in hallway w/ RW and CGA, decreased step length noted, however no LOB, slight unsteadiness

## 2023-05-07 NOTE — PHYSICAL THERAPY INITIAL EVALUATION ADULT - GAIT DEVIATIONS NOTED, PT EVAL
Mod unsteadiness with hand held assist improved with RW, no loss of balance throughout, dec BLE clearance. Pt reports feeling less steady than baseline/decreased shy/decreased velocity of limb motion/decreased step length/decreased weight-shifting ability

## 2023-05-07 NOTE — PHYSICAL THERAPY INITIAL EVALUATION ADULT - PERTINENT HX OF CURRENT PROBLEM, REHAB EVAL
87y Female with PMHx of HTN, arthritis presented to [facility] after sudden onset of dizziness that began 0930. Patient was in her usual state of health this morning and had a sudden onset of headache with dizziness and unsteadiness while she was in the bathroom. Her daughter, who the patient lives with, saw that she was feeling unwell and helped her to bed and gave her Tylenol and water. The patient did not experience any room spinning, but felt like she was going to fall. Symptoms worsen with movement. CTH negative for any acute intracranial pathology. CTA negative for high grade stenosis or LVO. CTP with right cerebellar and bilateral posterior cerebral Tmax elevation, but delayed bolus curves. Patient found to be hypertensive with SBP in the 200's, given IV labetalol and hydralazine.

## 2023-05-07 NOTE — OCCUPATIONAL THERAPY INITIAL EVALUATION ADULT - ORIENTATION, REHAB EVAL
at first able to remember hospital but unaware of which/oriented to person, place, time and situation

## 2023-05-07 NOTE — PHYSICAL THERAPY INITIAL EVALUATION ADULT - ADDITIONAL COMMENTS
As per pt, PTA she was completely independent with all functional mobility, ADLs, and IADLs with no AD and is a community ambulator. Pt grocery shops and runs errands alone. Pt recently received R knee injections in Dec/Jan for pain. Has a walk in shower with a bench.

## 2023-05-07 NOTE — PROGRESS NOTE ADULT - SUBJECTIVE AND OBJECTIVE BOX
Neurology Stroke Progress Note    INTERVAL HPI/OVERNIGHT EVENTS:  Pt admitted overnight. Patient seen and examined this AM. States she is feeling better than last night but still has some dizziness, denies room spinning sensation. Pt states while walking with PT felt slightly guarded and was slower with walking. Otherwise denies acute complaints.     MEDICATIONS  (STANDING):  aspirin enteric coated 81 milliGRAM(s) Oral daily  atorvastatin 80 milliGRAM(s) Oral at bedtime  enoxaparin Injectable 40 milliGRAM(s) SubCutaneous every 24 hours  ketotifen 0.025% Ophthalmic Solution 1 Drop(s) Both EYES daily  multivitamin 1 Tablet(s) Oral daily    MEDICATIONS  (PRN):  acetaminophen     Tablet .. 650 milliGRAM(s) Oral every 6 hours PRN Mild Pain (1 - 3)      Allergies    No Known Allergies    Intolerances    Vital Signs Last 24 Hrs  T(C): 36.9 (07 May 2023 14:10), Max: 37.1 (07 May 2023 09:17)  T(F): 98.4 (07 May 2023 14:10), Max: 98.7 (07 May 2023 09:17)  HR: 82 (07 May 2023 13:14) (56 - 82)  BP: 156/78 (07 May 2023 13:14) (118/59 - 195/86)  BP(mean): 107 (07 May 2023 13:14) (83 - 124)  RR: 20 (07 May 2023 13:14) (13 - 42)  SpO2: 96% (07 May 2023 13:14) (96% - 99%)    Parameters below as of 07 May 2023 13:14  Patient On (Oxygen Delivery Method): room air    Physical Exam:   General: No acute distress, awake and alert  Cardiovascular: Regular rate and rhythm on monitor, no murmurs, rubs, or gallops. No bruits  Pulmonary: Anterior breath sounds clear bilaterally, no crackles or wheezing. No use of accessory muscles  GI: Abdomen soft, non-tender, non-distended    Neurologic:  -Mental status: Awake, alert, oriented to person, place, and time. Speech is fluent with intact naming, repetition, and comprehension, no dysarthria. Recent and remote memory intact. Follows commands. Attention/concentration intact. Fund of knowledge appropriate.  -Cranial nerves:   II: Visual fields are full to confrontation.  III, IV, VI: Extraocular movements are intact without nystagmus. Pupils equally round and reactive to light  V:  Facial sensation V1-V3 equal and intact   VII: Face is symmetric with normal smile  XII: Tongue protrudes midline  Motor: Normal bulk and tone. No pronator drift noted. Strength bilateral upper extremity 5/5, bilateral lower extremities 5/5.  Rapid alternating movements intact and symmetric  Sensation: Intact to light touch bilaterally. No neglect or extinction on double simultaneous testing.  Coordination: No dysmetria on finger-to-nose bilaterally  Reflexes: Downgoing toes bilaterally   Gait: Narrow gait and steady, guarded    NIHSS: 0      LABS:                        13.9   3.46  )-----------( 222      ( 07 May 2023 05:30 )             41.0     05-07    133<L>  |  100  |  13  ----------------------------<  102<H>  4.2   |  22  |  0.58    Ca    9.1      07 May 2023 05:30  Phos  3.8     05-07  Mg     2.2     05-07    TPro  7.4  /  Alb  4.0  /  TBili  0.3  /  DBili  x   /  AST  51<H>  /  ALT  52<H>  /  AlkPhos  85  05-06    PT/INR - ( 06 May 2023 11:24 )   PT: 12.4 sec;   INR: 1.06          PTT - ( 06 May 2023 11:24 )  PTT:34.5 sec      RADIOLOGY & ADDITIONAL TESTS:

## 2023-05-07 NOTE — PROGRESS NOTE ADULT - ASSESSMENT
87y Female with PMHx of HTN, arthritis transferred from [facility] to [facility] today after sudden onset of headache, dizziness, and unsteady gait that began this morning at 0930. NIHSS 0 on arrival to [facility]. CTH negative for any acute intracranial pathology. CTA negative for high grade stenosis or LVO. CTP with right cerebellar and bilateral posterior cerebral Tmax elevation, but delayed bolus curves. Not a candidate for any intervention. Hypertensive to the 200's treated with IV pushes. Transferred to [facility] for further workup and stroke rule out, mild right upper extremity pronation noted on exam, resolved this morning.     Neuro  #CVA workup  - start aspirin 81mg daily, holding plavix for now pending MR imaging   - start atorvastatin 80mg daily  - q4hr stroke neuro checks and vitals  - obtain MRI Brain without contrast  - Stroke Code HCT Results: Negative  - Stroke Code CTA Results: No LVO or HGS. Moderate calcified plaque at each carotid bifurcation. Scattered sites of mild atherosclerotic stenosis.  - Stroke education    Cards  #HTN  - Goal SBP <180  - start home atenolol 25mg daily to prevent reflex tachycardia  - hold home blood pressure medication for now (Valsartan 80mg, Atenolol 50mg)  - Labetalol IVP for SBP > 180  - obtain TTE   - Stroke Code EKG Results: NSR    #HLD  - high dose statin as above in CVA  - LDL results: pending    Pulm  - call provider if SPO2 < 94%    GI  #Nutrition/Fluids/Electrolytes   - replete K<4 and Mg <2  - Diet: DASH/TLC    Renal  - Patient reports some urinary frequency at night    Infectious Disease  - Afebrile, no leukocytosis     Endocrine  - A1C results: 5.6    - TSH results: 2.010    ENT   #seasonal allergies  - Continue home artificial tears    DVT Prophylaxis  - lovenox sq for DVT prophylaxis   - SCDs for DVT prophylaxis       IDR Goals: Goals reviewed at interdisciplinary rounds with case management, social work, physical therapy, occupational therapy, and speech language pathology.   Please see specific therapy  notes for in depth goals.  Dispo: Home PT/OT     Discussed daily hospital plans and goals with patient and family at bedside.     Discussed with Neurology Attending Dr. Christiana Linder 87y Female with PMHx of HTN, arthritis transferred from [facility] to [facility] today after sudden onset of headache, dizziness, and unsteady gait that began this morning at 0930. NIHSS 0 on arrival to [facility]. CTH negative for any acute intracranial pathology. CTA negative for high grade stenosis or LVO. CTP with right cerebellar and bilateral posterior cerebral Tmax elevation, but delayed bolus curves. Not a candidate for any intervention. Hypertensive to the 200's treated with IV pushes. Transferred to [facility] for further workup and stroke rule out, mild right upper extremity pronation noted on exam, resolved this morning.     Neuro  #CVA workup  - start aspirin 81mg daily, holding plavix for now pending MR imaging   - start atorvastatin 80mg daily  - q4hr stroke neuro checks and vitals  - obtain MRI Brain without contrast  - orthostatic vitals negative   - Stroke Code HCT Results: Negative  - Stroke Code CTA Results: No LVO or HGS. Moderate calcified plaque at each carotid bifurcation. Scattered sites of mild atherosclerotic stenosis.  - Stroke education    Cards  #HTN  - Goal SBP <180  - start home atenolol 25mg daily to prevent reflex tachycardia  - hold home blood pressure medication for now (Valsartan 80mg, Atenolol 50mg)  - Labetalol IVP for SBP > 180  - obtain TTE   - Stroke Code EKG Results: NSR    #HLD  - high dose statin as above in CVA  - LDL results: pending    Pulm  - call provider if SPO2 < 94%    GI  #Nutrition/Fluids/Electrolytes   - replete K<4 and Mg <2  - Diet: DASH/TLC    Renal  - Patient reports some urinary frequency at night    Infectious Disease  - Afebrile, no leukocytosis     Endocrine  - A1C results: 5.6    - TSH results: 2.010    ENT   #seasonal allergies  - Continue home artificial tears    DVT Prophylaxis  - lovenox sq for DVT prophylaxis   - SCDs for DVT prophylaxis       IDR Goals: Goals reviewed at interdisciplinary rounds with case management, social work, physical therapy, occupational therapy, and speech language pathology.   Please see specific therapy  notes for in depth goals.  Dispo: Home PT/OT     Discussed daily hospital plans and goals with patient and family at bedside.     Discussed with Neurology Attending Dr. Christiana Linder

## 2023-05-07 NOTE — OCCUPATIONAL THERAPY INITIAL EVALUATION ADULT - GENERAL OBSERVATIONS, REHAB EVAL
OT IE complete. MRS 4. RN Beverly johnson pt. for OOB. Pt. received semi-supine in bed, +heplock, +b/l SCDs, +tele w. c/o slight dizziness however agreeable to therapy session

## 2023-05-07 NOTE — CONSULT NOTE ADULT - SUBJECTIVE AND OBJECTIVE BOX
SURGERY CONSULT  ==============================================================================================================  HPI: 87y HPI: 87y Female with PMHx of HTN, arthritis presented to [facility] today after sudden onset of dizziness that began this morning at 0930. Patient was in her usual state of health this morning and had a sudden onset of headache with dizziness and unsteadiness while she was in the bathroom. Her daughter, who the patient lives with, saw that she was feeling unwell and helped her to bed and gave her Tylenol and water. The patient did not experience any room spinning, but felt like she was going to fall. No reported falls or LOC. Symptoms improved after the patient laid down but they did not go away. Symptoms worsen with movement. NIHSS 0 at [facility]. CTH negative for any acute intracranial pathology. CTA negative for high grade stenosis or LVO. CTP with right cerebellar and bilateral posterior cerebral Tmax elevation, but delayed bolus curves. Patient found to be hypertensive with SBP in the 200's, given IV labetalol and hydralazine. Patient reports that her BP is normally a bit on the higher side but being in the 200's is very abnormal for her. She reports that she is compliant with her BP medication and took it this AM. Patient transferred to [facility] for further workup. NIHSS 0 on exam found to have slight pronation of RUE b/l upper extremities 5/5. BP elevated to 190's on arrival and sustained, given 10mg labetalol IVP.       87F w HTN, arthritis p/w dizziness, NIHSS 0 at [facility], imaging notable for CTP showing R cerebellar an db/l posterior cerebral TMax elevation, SBP in 200s admitted for further eval    #CVA/TIA eval   - A1C, LDL, TSH pending   - on DAPT, Atorva 80  #Hypertensive urgency. Home on atenolol 50, losartan 80. BP 130s     Recommend  f/u MR head, TTE, PT OT  BP Target per stroke-neuro  INCOMPLETE    ROS  FH  SH    PAST MEDICAL & SURGICAL HISTORY:  HTN (hypertension)      No significant past surgical history          FAMILY HISTORY:            T(C): 36.3 (05-06-23 @ 17:31), Max: 36.4 (05-06-23 @ 13:16)  HR: 75 (05-06-23 @ 20:00) (53 - 75)  BP: 189/79 (05-06-23 @ 20:00) (140/66 - 208/89)  RR: 20 (05-06-23 @ 20:00) (16 - 20)  SpO2: 99% (05-06-23 @ 20:00) (95% - 99%)    MEDICATION RECONCILIATION   MEDICATIONS  (STANDING):  aspirin enteric coated 81 milliGRAM(s) Oral daily  atorvastatin 80 milliGRAM(s) Oral at bedtime  clopidogrel Tablet 75 milliGRAM(s) Oral daily  labetalol Injectable 10 milliGRAM(s) IV Push once    MEDICATIONS  (PRN):    Allergies    No Known Allergies    Intolerances      Vital Signs Last 24 Hrs  T(C): 36.3 (06 May 2023 17:31), Max: 36.4 (06 May 2023 13:16)  T(F): 97.3 (06 May 2023 17:31), Max: 97.5 (06 May 2023 13:16)  HR: 75 (06 May 2023 20:00) (53 - 75)  BP: 189/79 (06 May 2023 20:00) (140/66 - 208/89)  BP(mean): 114 (06 May 2023 20:00) (114 - 124)  RR: 20 (06 May 2023 20:00) (16 - 20)  SpO2: 99% (06 May 2023 20:00) (95% - 99%)    Parameters below as of 06 May 2023 20:00  Patient On (Oxygen Delivery Method): room air           (06 May 2023 20:36)      PAST MEDICAL & SURGICAL HISTORY:  HTN (hypertension)      No significant past surgical history        Home Meds: Home Medications:  ATENOLOL 50MG TAB:  (06 May 2023 21:11)  Multiple Vitamins oral tablet: 1 orally once a day (06 May 2023 21:14)  Refresh Eye Itch Relief 0.025% ophthalmic solution: 1 in each affected eye once a day (06 May 2023 21:12)  VALSARTAN 80MG TAB:  (06 May 2023 21:11)    Allergies: Allergies    No Known Allergies    Intolerances      Soc:   Advanced Directives: Presumed Full Code     CURRENT MEDICATIONS:   --------------------------------------------------------------------------------------  Neurologic Medications  acetaminophen     Tablet .. 650 milliGRAM(s) Oral every 6 hours PRN Mild Pain (1 - 3)    Respiratory Medications    Cardiovascular Medications    Gastrointestinal Medications  multivitamin 1 Tablet(s) Oral daily    Genitourinary Medications    Hematologic/Oncologic Medications  aspirin enteric coated 81 milliGRAM(s) Oral daily  clopidogrel Tablet 75 milliGRAM(s) Oral daily  enoxaparin Injectable 40 milliGRAM(s) SubCutaneous every 24 hours    Antimicrobial/Immunologic Medications    Endocrine/Metabolic Medications  atorvastatin 80 milliGRAM(s) Oral at bedtime    Topical/Other Medications  ketotifen 0.025% Ophthalmic Solution 1 Drop(s) Both EYES daily    --------------------------------------------------------------------------------------    VITAL SIGNS, INS/OUTS (last 24 hours):  --------------------------------------------------------------------------------------  ICU Vital Signs Last 24 Hrs  T(C): 36.8 (07 May 2023 05:14), Max: 36.9 (06 May 2023 22:13)  T(F): 98.2 (07 May 2023 05:14), Max: 98.5 (06 May 2023 22:13)  HR: 64 (07 May 2023 05:15) (53 - 75)  BP: 135/63 (07 May 2023 05:15) (118/59 - 208/89)  BP(mean): 91 (07 May 2023 05:15) (83 - 124)  ABP: --  ABP(mean): --  RR: 13 (07 May 2023 05:15) (13 - 20)  SpO2: 97% (07 May 2023 05:15) (95% - 99%)    O2 Parameters below as of 07 May 2023 05:15  Patient On (Oxygen Delivery Method): room air          I&O's Summary    06 May 2023 07:01  -  07 May 2023 07:00  --------------------------------------------------------  IN: 0 mL / OUT: 300 mL / NET: -300 mL      --------------------------------------------------------------------------------------    EXAM:    LABS  --------------------------------------------------------------------------------------  Labs:  CAPILLARY BLOOD GLUCOSE      POCT Blood Glucose.: 103 mg/dL (06 May 2023 11:26)                          13.4   3.59  )-----------( 198      ( 06 May 2023 11:24 )             40.1       Auto Neutrophil %: 44.5 % (05-06-23 @ 11:24)  Auto Immature Granulocyte %: 0.6 % (05-06-23 @ 11:24)    05-06    132  |  98  |  18  ----------------------------<  128<H>  4.3   |  26  |  0.66      Calcium, Total Serum: 9.2 mg/dL (05-06-23 @ 11:24)      LFTs:             7.4  | 0.3  | 51       ------------------[85      ( 06 May 2023 11:24 )  4.0  | x    | 52          Lipase:x      Amylase:x             Coags:     12.4   ----< 1.06    ( 06 May 2023 11:24 )     34.5                      --------------------------------------------------------------------------------------    OTHER LABS    IMAGING RESULTS  ****************   HPI "87y HPI: 87y Female with PMHx of HTN, arthritis presented to [facility] today after sudden onset of dizziness that began this morning at 0930. Patient was in her usual state of health this morning and had a sudden onset of headache with dizziness and unsteadiness while she was in the bathroom. Her daughter, who the patient lives with, saw that she was feeling unwell and helped her to bed and gave her Tylenol and water. The patient did not experience any room spinning, but felt like she was going to fall. No reported falls or LOC. Symptoms improved after the patient laid down but they did not go away. Symptoms worsen with movement. NIHSS 0 at [facility]. CTH negative for any acute intracranial pathology. CTA negative for high grade stenosis or LVO. CTP with right cerebellar and bilateral posterior cerebral Tmax elevation, but delayed bolus curves. Patient found to be hypertensive with SBP in the 200's, given IV labetalol and hydralazine. Patient reports that her BP is normally a bit on the higher side but being in the 200's is very abnormal for her. She reports that she is compliant with her BP medication and took it this AM. Patient transferred to [facility] for further workup. NIHSS 0 on exam found to have slight pronation of RUE b/l upper extremities 5/5. BP elevated to 190's on arrival and sustained, given 10mg labetalol IVP. "      87F w HTN, arthritis p/w dizziness, NIHSS 0 at [facility], imaging notable for CTP showing R cerebellar an db/l posterior cerebral TMax elevation, SBP in 200s admitted for further eval    Pt reports being in usual state of health and being adherent to BP medications. Admits she does not check BP regularly. Pt felt suddenly lightheaded and dizzy yesterday while going to the bathroom. No chest pain, dyspnea, pre-syncope. Denies sick contacts. Today feels lightheadedness and dizziness has largely resolved. Ambulated w PT today. Denies any pain, nausea, abd pain, vomiting. No dysuria or diarrhea.    ROS: 12 point ROS reviewed and otherwise negative as per HPI  FH: No stroke, DVT/PE  SH: Non smoker. Denies EtOH    PAST MEDICAL & SURGICAL HISTORY:  HTN (hypertension)      No significant past surgical history          FAMILY HISTORY:            T(C): 36.3 (05-06-23 @ 17:31), Max: 36.4 (05-06-23 @ 13:16)  HR: 75 (05-06-23 @ 20:00) (53 - 75)  BP: 189/79 (05-06-23 @ 20:00) (140/66 - 208/89)  RR: 20 (05-06-23 @ 20:00) (16 - 20)  SpO2: 99% (05-06-23 @ 20:00) (95% - 99%)    MEDICATION RECONCILIATION   MEDICATIONS  (STANDING):  aspirin enteric coated 81 milliGRAM(s) Oral daily  atorvastatin 80 milliGRAM(s) Oral at bedtime  clopidogrel Tablet 75 milliGRAM(s) Oral daily  labetalol Injectable 10 milliGRAM(s) IV Push once    MEDICATIONS  (PRN):    Allergies    No Known Allergies    Intolerances      Vital Signs Last 24 Hrs  T(C): 36.3 (06 May 2023 17:31), Max: 36.4 (06 May 2023 13:16)  T(F): 97.3 (06 May 2023 17:31), Max: 97.5 (06 May 2023 13:16)  HR: 75 (06 May 2023 20:00) (53 - 75)  BP: 189/79 (06 May 2023 20:00) (140/66 - 208/89)  BP(mean): 114 (06 May 2023 20:00) (114 - 124)  RR: 20 (06 May 2023 20:00) (16 - 20)  SpO2: 99% (06 May 2023 20:00) (95% - 99%)    Parameters below as of 06 May 2023 20:00  Patient On (Oxygen Delivery Method): room air           (06 May 2023 20:36)      PAST MEDICAL & SURGICAL HISTORY:  HTN (hypertension)      No significant past surgical history        Home Meds: Home Medications:  ATENOLOL 50MG TAB:  (06 May 2023 21:11)  Multiple Vitamins oral tablet: 1 orally once a day (06 May 2023 21:14)  Refresh Eye Itch Relief 0.025% ophthalmic solution: 1 in each affected eye once a day (06 May 2023 21:12)  VALSARTAN 80MG TAB:  (06 May 2023 21:11)    Allergies: Allergies    No Known Allergies    Intolerances      Soc:   Advanced Directives: Presumed Full Code     CURRENT MEDICATIONS:   --------------------------------------------------------------------------------------  Neurologic Medications  acetaminophen     Tablet .. 650 milliGRAM(s) Oral every 6 hours PRN Mild Pain (1 - 3)    Respiratory Medications    Cardiovascular Medications    Gastrointestinal Medications  multivitamin 1 Tablet(s) Oral daily    Genitourinary Medications    Hematologic/Oncologic Medications  aspirin enteric coated 81 milliGRAM(s) Oral daily  clopidogrel Tablet 75 milliGRAM(s) Oral daily  enoxaparin Injectable 40 milliGRAM(s) SubCutaneous every 24 hours    Antimicrobial/Immunologic Medications    Endocrine/Metabolic Medications  atorvastatin 80 milliGRAM(s) Oral at bedtime    Topical/Other Medications  ketotifen 0.025% Ophthalmic Solution 1 Drop(s) Both EYES daily    --------------------------------------------------------------------------------------    VITAL SIGNS, INS/OUTS (last 24 hours):  --------------------------------------------------------------------------------------  ICU Vital Signs Last 24 Hrs  T(C): 36.8 (07 May 2023 05:14), Max: 36.9 (06 May 2023 22:13)  T(F): 98.2 (07 May 2023 05:14), Max: 98.5 (06 May 2023 22:13)  HR: 64 (07 May 2023 05:15) (53 - 75)  BP: 135/63 (07 May 2023 05:15) (118/59 - 208/89)  BP(mean): 91 (07 May 2023 05:15) (83 - 124)  ABP: --  ABP(mean): --  RR: 13 (07 May 2023 05:15) (13 - 20)  SpO2: 97% (07 May 2023 05:15) (95% - 99%)    O2 Parameters below as of 07 May 2023 05:15  Patient On (Oxygen Delivery Method): room air          I&O's Summary    06 May 2023 07:01  -  07 May 2023 07:00  --------------------------------------------------------  IN: 0 mL / OUT: 300 mL / NET: -300 mL      --------------------------------------------------------------------------------------    EXAM:  GEN: female in NAD on RA  HEENT: NC/AT, MMM  CV: RRR, nml S1S2, no murmurs  PULM: nml effort, CTAB  ABD: Soft, non-distended, NABS, non-tender  NEURO  A/O x3,  EOMI, No droop  5/5 in BUE and BLE  no dysmetria - nml finger to nose  Sensation intact  PSYCH: Appropriate      LABS  --------------------------------------------------------------------------------------  Labs:  CAPILLARY BLOOD GLUCOSE      POCT Blood Glucose.: 103 mg/dL (06 May 2023 11:26)                          13.4   3.59  )-----------( 198      ( 06 May 2023 11:24 )             40.1       Auto Neutrophil %: 44.5 % (05-06-23 @ 11:24)  Auto Immature Granulocyte %: 0.6 % (05-06-23 @ 11:24)    05-06    132  |  98  |  18  ----------------------------<  128<H>  4.3   |  26  |  0.66      Calcium, Total Serum: 9.2 mg/dL (05-06-23 @ 11:24)      LFTs:             7.4  | 0.3  | 51       ------------------[85      ( 06 May 2023 11:24 )  4.0  | x    | 52          Lipase:x      Amylase:x             Coags:     12.4   ----< 1.06    ( 06 May 2023 11:24 )     34.5                      --------------------------------------------------------------------------------------    OTHER LABS    IMAGING RESULTS  ****************

## 2023-05-08 ENCOUNTER — TRANSCRIPTION ENCOUNTER (OUTPATIENT)
Age: 88
End: 2023-05-08

## 2023-05-08 DIAGNOSIS — I10 ESSENTIAL (PRIMARY) HYPERTENSION: ICD-10-CM

## 2023-05-08 DIAGNOSIS — E78.5 HYPERLIPIDEMIA, UNSPECIFIED: ICD-10-CM

## 2023-05-08 DIAGNOSIS — R42 DIZZINESS AND GIDDINESS: ICD-10-CM

## 2023-05-08 LAB
ANION GAP SERPL CALC-SCNC: 11 MMOL/L — SIGNIFICANT CHANGE UP (ref 5–17)
BUN SERPL-MCNC: 12 MG/DL — SIGNIFICANT CHANGE UP (ref 7–23)
CALCIUM SERPL-MCNC: 8.8 MG/DL — SIGNIFICANT CHANGE UP (ref 8.4–10.5)
CHLORIDE SERPL-SCNC: 103 MMOL/L — SIGNIFICANT CHANGE UP (ref 96–108)
CO2 SERPL-SCNC: 22 MMOL/L — SIGNIFICANT CHANGE UP (ref 22–31)
CREAT SERPL-MCNC: 0.57 MG/DL — SIGNIFICANT CHANGE UP (ref 0.5–1.3)
EGFR: 88 ML/MIN/1.73M2 — SIGNIFICANT CHANGE UP
GLUCOSE SERPL-MCNC: 99 MG/DL — SIGNIFICANT CHANGE UP (ref 70–99)
HCT VFR BLD CALC: 39.9 % — SIGNIFICANT CHANGE UP (ref 34.5–45)
HGB BLD-MCNC: 13.4 G/DL — SIGNIFICANT CHANGE UP (ref 11.5–15.5)
MAGNESIUM SERPL-MCNC: 2 MG/DL — SIGNIFICANT CHANGE UP (ref 1.6–2.6)
MCHC RBC-ENTMCNC: 30 PG — SIGNIFICANT CHANGE UP (ref 27–34)
MCHC RBC-ENTMCNC: 33.6 GM/DL — SIGNIFICANT CHANGE UP (ref 32–36)
MCV RBC AUTO: 89.3 FL — SIGNIFICANT CHANGE UP (ref 80–100)
NRBC # BLD: 0 /100 WBCS — SIGNIFICANT CHANGE UP (ref 0–0)
PHOSPHATE SERPL-MCNC: 3.6 MG/DL — SIGNIFICANT CHANGE UP (ref 2.5–4.5)
PLATELET # BLD AUTO: 228 K/UL — SIGNIFICANT CHANGE UP (ref 150–400)
POTASSIUM SERPL-MCNC: 3.9 MMOL/L — SIGNIFICANT CHANGE UP (ref 3.5–5.3)
POTASSIUM SERPL-SCNC: 3.9 MMOL/L — SIGNIFICANT CHANGE UP (ref 3.5–5.3)
RBC # BLD: 4.47 M/UL — SIGNIFICANT CHANGE UP (ref 3.8–5.2)
RBC # FLD: 14.6 % — HIGH (ref 10.3–14.5)
SODIUM SERPL-SCNC: 136 MMOL/L — SIGNIFICANT CHANGE UP (ref 135–145)
WBC # BLD: 3.46 K/UL — LOW (ref 3.8–10.5)
WBC # FLD AUTO: 3.46 K/UL — LOW (ref 3.8–10.5)

## 2023-05-08 PROCEDURE — 99233 SBSQ HOSP IP/OBS HIGH 50: CPT

## 2023-05-08 PROCEDURE — 93306 TTE W/DOPPLER COMPLETE: CPT | Mod: 26

## 2023-05-08 RX ORDER — HYDRALAZINE HCL 50 MG
5 TABLET ORAL ONCE
Refills: 0 | Status: COMPLETED | OUTPATIENT
Start: 2023-05-08 | End: 2023-05-08

## 2023-05-08 RX ORDER — VALSARTAN 80 MG/1
80 TABLET ORAL DAILY
Refills: 0 | Status: DISCONTINUED | OUTPATIENT
Start: 2023-05-08 | End: 2023-05-09

## 2023-05-08 RX ORDER — VALSARTAN 80 MG/1
80 TABLET ORAL DAILY
Refills: 0 | Status: DISCONTINUED | OUTPATIENT
Start: 2023-05-08 | End: 2023-05-08

## 2023-05-08 RX ORDER — ATENOLOL 25 MG/1
50 TABLET ORAL DAILY
Refills: 0 | Status: DISCONTINUED | OUTPATIENT
Start: 2023-05-08 | End: 2023-05-11

## 2023-05-08 RX ORDER — HYDRALAZINE HCL 50 MG
10 TABLET ORAL ONCE
Refills: 0 | Status: COMPLETED | OUTPATIENT
Start: 2023-05-08 | End: 2023-05-08

## 2023-05-08 RX ADMIN — Medication 1 TABLET(S): at 12:13

## 2023-05-08 RX ADMIN — Medication 81 MILLIGRAM(S): at 12:13

## 2023-05-08 RX ADMIN — ATORVASTATIN CALCIUM 80 MILLIGRAM(S): 80 TABLET, FILM COATED ORAL at 21:11

## 2023-05-08 RX ADMIN — ENOXAPARIN SODIUM 40 MILLIGRAM(S): 100 INJECTION SUBCUTANEOUS at 05:10

## 2023-05-08 RX ADMIN — ATENOLOL 50 MILLIGRAM(S): 25 TABLET ORAL at 05:10

## 2023-05-08 RX ADMIN — Medication 5 MILLIGRAM(S): at 07:39

## 2023-05-08 RX ADMIN — Medication 10 MILLIGRAM(S): at 15:52

## 2023-05-08 RX ADMIN — KETOTIFEN FUMARATE 1 DROP(S): 0.34 SOLUTION OPHTHALMIC at 12:21

## 2023-05-08 RX ADMIN — VALSARTAN 80 MILLIGRAM(S): 80 TABLET ORAL at 05:11

## 2023-05-08 NOTE — PROVIDER CONTACT NOTE (OTHER) - SITUATION
/78
blood pressure below parameters
pt /91
/88 prior to IVP hydralazine
Patient returns from echo test. /96
hypertension pa at [facility]sd
pt /88

## 2023-05-08 NOTE — DISCHARGE NOTE PROVIDER - CARE PROVIDER_API CALL
Farhana Vallejo)  Neurology  [address] [address]  Phone: [phone]  Fax: [phone]  Follow Up Time:

## 2023-05-08 NOTE — PROVIDER CONTACT NOTE (OTHER) - DATE AND TIME:
06-May-2023 20:00
08-May-2023 07:27
08-May-2023 15:56
08-May-2023 16:30
08-May-2023 05:23
08-May-2023 15:45
07-May-2023 00:55

## 2023-05-08 NOTE — PROGRESS NOTE ADULT - SUBJECTIVE AND OBJECTIVE BOX
Neurology Stroke Progress Note    INTERVAL HPI/OVERNIGHT EVENTS:  Patient seen and examined.  number ______ used.    MEDICATIONS  (STANDING):  aspirin enteric coated 81 milliGRAM(s) Oral daily  atenolol  Tablet 50 milliGRAM(s) Oral daily  atorvastatin 80 milliGRAM(s) Oral at bedtime  enoxaparin Injectable 40 milliGRAM(s) SubCutaneous every 24 hours  ketotifen 0.025% Ophthalmic Solution 1 Drop(s) Both EYES daily  multivitamin 1 Tablet(s) Oral daily  valsartan 80 milliGRAM(s) Oral daily    MEDICATIONS  (PRN):  acetaminophen     Tablet .. 650 milliGRAM(s) Oral every 6 hours PRN Mild Pain (1 - 3)      Allergies    No Known Allergies    Intolerances        Vital Signs Last 24 Hrs  T(C): 36.5 (08 May 2023 05:38), Max: 36.8 (07 May 2023 18:01)  T(F): 97.7 (08 May 2023 05:38), Max: 98.3 (07 May 2023 18:01)  HR: 65 (08 May 2023 16:30) (51 - 78)  BP: 183/78 (08 May 2023 16:30) (147/68 - 209/91)  BP(mean): 112 (08 May 2023 16:30) (98 - 138)  RR: 20 (08 May 2023 16:30) (18 - 20)  SpO2: 96% (08 May 2023 16:30) (94% - 99%)    Parameters below as of 08 May 2023 16:30  Patient On (Oxygen Delivery Method): room air        Physical exam:  General: No acute distress, awake and alert  Eyes: Anicteric sclerae, moist conjunctivae, see below for CNs  Neck: trachea midline, FROM, supple, no thyromegaly or lymphadenopathy  Cardiovascular: Regular rate and rhythm, no murmurs, rubs, or gallops. No carotid bruits.   Pulmonary: Anterior breath sounds clear bilaterally, no crackles or wheezing. No use of accessory muscles  GI: Abdomen soft, non-distended, non-tender  Extremities: Radial and DP pulses +2, no edema    Neurologic:  -Mental status: Awake, alert, oriented to person, place, and time. Speech is fluent with intact naming, repetition, and comprehension, no dysarthria. Recent and remote memory intact. Follows commands. Attention/concentration intact. Fund of knowledge appropriate.  -Cranial nerves:   II: Visual fields are full to confrontation.  III, IV, VI: Extraocular movements are intact without nystagmus. Pupils equally round and reactive to light  V:  Facial sensation V1-V3 equal and intact   VII: Face is symmetric with normal eye closure and smile  VIII: Hearing is bilaterally intact to finger rub  IX, X: Uvula is midline and soft palate rises symmetrically  XI: Head turning and shoulder shrug are intact.  XII: Tongue protrudes midline  Motor: Normal bulk and tone. No pronator drift. Strength bilateral upper extremity 5/5, bilateral lower extremities 5/5.  Rapid alternating movements intact and symmetric  Sensation: Intact to light touch bilaterally. No neglect or extinction on double simultaneous testing.  Coordination: No dysmetria on finger-to-nose and heel-to-shin bilaterally  Reflexes: Downgoing toes bilaterally   Gait: Narrow gait and steady    LABS:                        13.4   3.46  )-----------( 228      ( 08 May 2023 05:30 )             39.9     05-08    136  |  103  |  12  ----------------------------<  99  3.9   |  22  |  0.57    Ca    8.8      08 May 2023 05:30  Phos  3.6     05-08  Mg     2.0     05-08            RADIOLOGY & ADDITIONAL TESTS:     Neurology Stroke Progress Note    INTERVAL HPI/OVERNIGHT EVENTS:  Required hydralazine 5mg IVP for sBP in 200s. Patient seen and examined. Pt states she is feeling well, c/o intermittent mild headache. Otherwise sitting up in chair, in no acute distress.     MEDICATIONS  (STANDING):  aspirin enteric coated 81 milliGRAM(s) Oral daily  atenolol  Tablet 50 milliGRAM(s) Oral daily  atorvastatin 80 milliGRAM(s) Oral at bedtime  enoxaparin Injectable 40 milliGRAM(s) SubCutaneous every 24 hours  ketotifen 0.025% Ophthalmic Solution 1 Drop(s) Both EYES daily  multivitamin 1 Tablet(s) Oral daily  valsartan 80 milliGRAM(s) Oral daily    MEDICATIONS  (PRN):  acetaminophen     Tablet .. 650 milliGRAM(s) Oral every 6 hours PRN Mild Pain (1 - 3)      Allergies    No Known Allergies    Intolerances        Vital Signs Last 24 Hrs  T(C): 36.5 (08 May 2023 05:38), Max: 36.8 (07 May 2023 18:01)  T(F): 97.7 (08 May 2023 05:38), Max: 98.3 (07 May 2023 18:01)  HR: 65 (08 May 2023 16:30) (51 - 78)  BP: 183/78 (08 May 2023 16:30) (147/68 - 209/91)  BP(mean): 112 (08 May 2023 16:30) (98 - 138)  RR: 20 (08 May 2023 16:30) (18 - 20)  SpO2: 96% (08 May 2023 16:30) (94% - 99%)    Parameters below as of 08 May 2023 16:30  Patient On (Oxygen Delivery Method): room air    Physical exam:  General: No acute distress, awake and alert  Eyes: Anicteric sclerae, moist conjunctivae, see below for CNs  Neck: trachea midline, FROM, supple, no thyromegaly or lymphadenopathy  Cardiovascular: Regular rate and rhythm, no murmurs, rubs, or gallops. No carotid bruits.   Pulmonary: Anterior breath sounds clear bilaterally, no crackles or wheezing. No use of accessory muscles  GI: Abdomen soft, non-distended, non-tender  Extremities: Radial and DP pulses +2, no edema    Neurologic:  -Mental status: Awake, alert, oriented to person, place, and time. Speech is fluent with intact naming, repetition, and comprehension, no dysarthria. Recent and remote memory intact. Follows commands. Attention/concentration intact. Fund of knowledge appropriate.  -Cranial nerves:   II: Visual fields are full to confrontation.  III, IV, VI: Extraocular movements are intact without nystagmus. Pupils equally round and reactive to light  V:  Facial sensation V1-V3 equal and intact   VII: Face is symmetric with normal eye closure and smile  VIII: Hearing is bilaterally intact to finger rub  IX, X: Uvula is midline and soft palate rises symmetrically  XI: Head turning and shoulder shrug are intact.  XII: Tongue protrudes midline  Motor: Normal bulk and tone. No pronator drift. Strength bilateral upper extremity 5/5, bilateral lower extremities 5/5.  Rapid alternating movements intact and symmetric  Sensation: Intact to light touch bilaterally. No neglect or extinction on double simultaneous testing.  Coordination: No dysmetria on finger-to-nose and heel-to-shin bilaterally  Reflexes: Downgoing toes bilaterally   Gait: Narrow gait and steady    LABS:                        13.4   3.46  )-----------( 228      ( 08 May 2023 05:30 )             39.9     05-08    136  |  103  |  12  ----------------------------<  99  3.9   |  22  |  0.57    Ca    8.8      08 May 2023 05:30  Phos  3.6     05-08  Mg     2.0     05-08            RADIOLOGY & ADDITIONAL TESTS:     Neurology Stroke Progress Note    INTERVAL HPI/OVERNIGHT EVENTS:  Required hydralazine 5mg IVP for sBP in 200s. Patient seen and examined. Pt states she is feeling well, c/o intermittent mild headache. Otherwise sitting up in chair, in no acute distress.     MEDICATIONS  (STANDING):  aspirin enteric coated 81 milliGRAM(s) Oral daily  atenolol  Tablet 50 milliGRAM(s) Oral daily  atorvastatin 80 milliGRAM(s) Oral at bedtime  enoxaparin Injectable 40 milliGRAM(s) SubCutaneous every 24 hours  ketotifen 0.025% Ophthalmic Solution 1 Drop(s) Both EYES daily  multivitamin 1 Tablet(s) Oral daily  valsartan 80 milliGRAM(s) Oral daily    MEDICATIONS  (PRN):  acetaminophen     Tablet .. 650 milliGRAM(s) Oral every 6 hours PRN Mild Pain (1 - 3)      Allergies    No Known Allergies    Intolerances        Vital Signs Last 24 Hrs  T(C): 36.5 (08 May 2023 05:38), Max: 36.8 (07 May 2023 18:01)  T(F): 97.7 (08 May 2023 05:38), Max: 98.3 (07 May 2023 18:01)  HR: 65 (08 May 2023 16:30) (51 - 78)  BP: 183/78 (08 May 2023 16:30) (147/68 - 209/91)  BP(mean): 112 (08 May 2023 16:30) (98 - 138)  RR: 20 (08 May 2023 16:30) (18 - 20)  SpO2: 96% (08 May 2023 16:30) (94% - 99%)    Parameters below as of 08 May 2023 16:30  Patient On (Oxygen Delivery Method): room air    Physical Exam:   General: No acute distress, awake and alert  Cardiovascular: Regular rate and rhythm  Pulmonary: No use of accessory muscles  GI: Abdomen soft, non-tender, non-distended    Neurologic:  -Mental status: Awake, alert, oriented to person, place, and time. Speech is fluent with intact naming, repetition, and comprehension, no dysarthria. Recent and remote memory intact. Follows commands. Attention/concentration intact. Fund of knowledge appropriate.  -Cranial nerves:   II: Visual fields are full to confrontation.  III, IV, VI: Extraocular movements are intact without nystagmus. Pupils equally round and reactive to light  V:  Facial sensation V1-V3 equal and intact   VII: Face is symmetric with normal smile  XII: Tongue protrudes midline  Motor: Normal bulk and tone. No pronator drift noted. Strength bilateral upper extremity 5/5, bilateral lower extremities 5/5.  Rapid alternating movements intact and symmetric  Sensation: Intact to light touch bilaterally. No neglect or extinction on double simultaneous testing.  Coordination: No dysmetria on finger-to-nose bilaterally  Reflexes: Downgoing toes bilaterally   Gait: Narrow gait and steady, guarded    LABS:                        13.4   3.46  )-----------( 228      ( 08 May 2023 05:30 )             39.9     05-08    136  |  103  |  12  ----------------------------<  99  3.9   |  22  |  0.57    Ca    8.8      08 May 2023 05:30  Phos  3.6     05-08  Mg     2.0     05-08            RADIOLOGY & ADDITIONAL TESTS:     Neurology Stroke Progress Note    INTERVAL HPI/OVERNIGHT EVENTS:  Required hydralazine 5mg IVP for sBP in 200s. Patient seen and examined. Pt states she is feeling well, c/o intermittent mild headache. Otherwise sitting up in chair, in no acute distress.     MEDICATIONS  (STANDING):  aspirin enteric coated 81 milliGRAM(s) Oral daily  atenolol  Tablet 50 milliGRAM(s) Oral daily  atorvastatin 80 milliGRAM(s) Oral at bedtime  enoxaparin Injectable 40 milliGRAM(s) SubCutaneous every 24 hours  ketotifen 0.025% Ophthalmic Solution 1 Drop(s) Both EYES daily  multivitamin 1 Tablet(s) Oral daily  valsartan 80 milliGRAM(s) Oral daily    MEDICATIONS  (PRN):  acetaminophen     Tablet .. 650 milliGRAM(s) Oral every 6 hours PRN Mild Pain (1 - 3)      Allergies    No Known Allergies    Intolerances        Vital Signs Last 24 Hrs  T(C): 36.5 (08 May 2023 05:38), Max: 36.8 (07 May 2023 18:01)  T(F): 97.7 (08 May 2023 05:38), Max: 98.3 (07 May 2023 18:01)  HR: 65 (08 May 2023 16:30) (51 - 78)  BP: 183/78 (08 May 2023 16:30) (147/68 - 209/91)  BP(mean): 112 (08 May 2023 16:30) (98 - 138)  RR: 20 (08 May 2023 16:30) (18 - 20)  SpO2: 96% (08 May 2023 16:30) (94% - 99%)    Parameters below as of 08 May 2023 16:30  Patient On (Oxygen Delivery Method): room air    Physical Exam:   General: No acute distress, awake and alert  Cardiovascular: Regular rate and rhythm  Pulmonary: No use of accessory muscles  GI: Abdomen soft, non-tender, non-distended    Neurologic:  -Mental status: Awake, alert, oriented to person, place, and time. Speech is fluent with intact naming, repetition, and comprehension, no dysarthria. Recent and remote memory intact. Follows commands. Attention/concentration intact. Fund of knowledge appropriate.  -Cranial nerves:   II: Visual fields are full to confrontation.  III, IV, VI: Extraocular movements are intact without nystagmus. Pupils equally round and reactive to light  V:  Facial sensation V1-V3 equal and intact   VII: Face is symmetric with normal smile  XII: Tongue protrudes midline  Motor: Normal bulk and tone. No pronator drift noted. Strength bilateral upper extremity 5/5, bilateral lower extremities 5/5.  Rapid alternating movements intact and symmetric  Sensation: Intact to light touch bilaterally. No neglect or extinction on double simultaneous testing.  Coordination: No dysmetria on finger-to-nose bilaterally  Reflexes: Downgoing toes bilaterally   Gait: Narrow gait and steady, guarded    LABS:                        13.4   3.46  )-----------( 228      ( 08 May 2023 05:30 )             39.9     05-08    136  |  103  |  12  ----------------------------<  99  3.9   |  22  |  0.57    Ca    8.8      08 May 2023 05:30  Phos  3.6     05-08  Mg     2.0     05-08        RADIOLOGY & ADDITIONAL TESTS:     Neurology Stroke Progress Note    INTERVAL HPI/OVERNIGHT EVENTS:  Required hydralazine 5mg IVP for sBP in 200s. Patient seen and examined. Pt states she is feeling well, c/o intermittent mild headache. Otherwise sitting up in chair, in no acute distress.     MEDICATIONS  (STANDING):  aspirin enteric coated 81 milliGRAM(s) Oral daily  atenolol  Tablet 50 milliGRAM(s) Oral daily  atorvastatin 80 milliGRAM(s) Oral at bedtime  enoxaparin Injectable 40 milliGRAM(s) SubCutaneous every 24 hours  ketotifen 0.025% Ophthalmic Solution 1 Drop(s) Both EYES daily  multivitamin 1 Tablet(s) Oral daily  valsartan 80 milliGRAM(s) Oral daily    MEDICATIONS  (PRN):  acetaminophen     Tablet .. 650 milliGRAM(s) Oral every 6 hours PRN Mild Pain (1 - 3)      Allergies    No Known Allergies    Intolerances        Vital Signs Last 24 Hrs  T(C): 36.5 (08 May 2023 05:38), Max: 36.8 (07 May 2023 18:01)  T(F): 97.7 (08 May 2023 05:38), Max: 98.3 (07 May 2023 18:01)  HR: 65 (08 May 2023 16:30) (51 - 78)  BP: 183/78 (08 May 2023 16:30) (147/68 - 209/91)  BP(mean): 112 (08 May 2023 16:30) (98 - 138)  RR: 20 (08 May 2023 16:30) (18 - 20)  SpO2: 96% (08 May 2023 16:30) (94% - 99%)    Parameters below as of 08 May 2023 16:30  Patient On (Oxygen Delivery Method): room air    Physical Exam:   General: No acute distress, awake and alert  Cardiovascular: Regular rate and rhythm  Pulmonary: No use of accessory muscles  GI: Abdomen soft, non-tender, non-distended    Neurologic:  -Mental status: Awake, alert, oriented to person, place, and time. Speech is fluent with intact naming, repetition, and comprehension, no dysarthria. Recent and remote memory intact. Follows commands. Attention/concentration intact. Fund of knowledge appropriate.  -Cranial nerves:   II: Visual fields are full to confrontation.  III, IV, VI: Extraocular movements are intact without nystagmus. Pupils equally round and reactive to light  V:  Facial sensation V1-V3 equal and intact   VII: Face is symmetric with normal smile  XII: Tongue protrudes midline  Motor: Normal bulk and tone. No pronator drift noted. Strength bilateral upper extremity 5/5, bilateral lower extremities 5/5.  Rapid alternating movements intact and symmetric  Sensation: Intact to light touch bilaterally. No neglect or extinction on double simultaneous testing.  Coordination: No dysmetria on finger-to-nose bilaterally  Reflexes: Downgoing toes bilaterally   Gait: Narrow gait and steady, guarded    LABS:                        13.4   3.46  )-----------( 228      ( 08 May 2023 05:30 )             39.9     05-08    136  |  103  |  12  ----------------------------<  99  3.9   |  22  |  0.57    Ca    8.8      08 May 2023 05:30  Phos  3.6     05-08  Mg     2.0     05-08      RADIOLOGY & ADDITIONAL TESTS:    < from: TTE Echo Complete w/o Contrast w/ Doppler (05.08.23 @ 13:26) >  CONCLUSIONS:     1. Small left ventricular size.   2. Mild symmetric left ventricular hypertrophy.   3. Normal left ventricular systolic function.   4.Indeterminate left ventricular diastolic function.   5. Normal right ventricular size and systolic function.   6. Normal atria.   7. Moderate aortic regurgitation.   8. Mild mitral regurgitation.   9. Moderate pulmonic regurgitation.  10. No pericardial effusion.  11. There was insufficient tricuspid regurgitation detected from which to   calculate pulmonary artery systolic pressure.  12. No prior echo is available for comparison.    < end of copied text >

## 2023-05-08 NOTE — PROVIDER CONTACT NOTE (OTHER) - BACKGROUND
hypertension
pt admitted from headaches, dizziness, and [id number] gait. CT and MRI negative. Home BP medications restarted this morning.
admit: evaluation, HTN to 200s, pmh: HTN, dizziness.
pt admitted for headache, dizziness, and unsteady gait, CT & MRI negative
hypertension

## 2023-05-08 NOTE — DISCHARGE NOTE PROVIDER - NSDCFUADDAPPT_GEN_ALL_CORE_FT
You will receive a call from the neurology office to set up an outpatient follow up appointment. If you do not receive a call within one week of discharge, please call [phone] and ask to make an appointment with a provider from the stroke team.    Follow up with your PCP within 2 weeks of discharge. You will receive a call from the neurology office to set up an outpatient follow up appointment. If you do not receive a call within one week of discharge, please call [phone] and ask to make an appointment with a provider from the stroke team.    Please follow up with your PCP on Monday (5/15) as scheduled.

## 2023-05-08 NOTE — PROVIDER CONTACT NOTE (OTHER) - ACTION/TREATMENT ORDERED:
continue to monitor.
recheck BP in 30 min
hydralazine to be ordered.
labetolol 10mg ivp
YORDAN Montiel aware, continue to monitor BP
monitor
IVP hydralazine to be ordered. recheck BP.

## 2023-05-08 NOTE — PROVIDER CONTACT NOTE (OTHER) - RECOMMENDATIONS
Notify PA and recheck vitals after patient is calm from walking from stretcher to bed.
medication
BP medications
notify PA team
monitor

## 2023-05-08 NOTE — DISCHARGE NOTE PROVIDER - HOSPITAL COURSE
Hospital course:  87y Female with PMH HTN, arthritis transferred from [facility] to [facility] 5/6/23 after sudden onset of headache, dizziness, and unsteady gait that began in the morning. NIHSS 0 on arrival to [facility]. CTH negative for any acute intracranial pathology. CTA negative for high grade stenosis or LVO. CTP with right cerebellar and bilateral posterior cerebral Tmax elevation, but delayed bolus curves. Not a candidate for any intervention. Hypertensive to the 200's treated with IV pushes. Transferred to [facility] for further workup and stroke rule out, MRI negative for stroke. Echo completed, refer to below for results. Suspect hypertensive emergency as the etiology of symptoms. Restarted home blood pressure medications with plan to titrate up as needed to maintain normotension. Plan to continue ASA 81mg daily (small vessel disease on MRI) and statin ().    Discharge NIHSS: 0    During this hospital course, patient did not have a stroke    Patient had the following workup done in house:  CT Head: No acute intracranial hemorrhage, large demarcated territorial  infarction, or mass effect.  MR Head Non Contrast: No acute ischemia, small vessel ischemic disease  CT Angio Head: No small artery occlusion or high-grade stenosis.   Vertebral basilar system widely patent. Scattered sites of mild   atherosclerotic stenosis.  CT Angio Neck: No arterial high-grade stenosis, occlusive disease or   evidence of dissection. Moderate calcified plaque at each carotid   bifurcation.    [x]echo   1. Small left ventricular size.   2. Mild symmetric left ventricular hypertrophy.   3. Normal left ventricular systolic function.   4.Indeterminate left ventricular diastolic function.   5. Normal right ventricular size and systolic function.   6. Normal atria.   7. Moderate aortic regurgitation.   8. Mild mitral regurgitation.   9. Moderate pulmonic regurgitation.  10. No pericardial effusion.  11. There was insufficient tricuspid regurgitation detected from which to   calculate pulmonary artery systolic pressure.  12. No prior echo is available for comparison.    [x]labs    A1C: 5.6  TSH: 2.01      Physical exam at discharge:    General: No acute distress, awake and alert  Cardiovascular: Regular rate and rhythm  Pulmonary: No use of accessory muscles  GI: Abdomen soft, non-tender, non-distended    Neurologic:  -Mental status: Awake, alert, oriented to person, place, and time. Speech is fluent with intact naming, repetition, and comprehension, no dysarthria. Recent and remote memory intact. Follows commands. Attention/concentration intact. Fund of knowledge appropriate.  -Cranial nerves:   II: Visual fields are full to confrontation.  III, IV, VI: Extraocular movements are intact without nystagmus. Pupils equally round and reactive to light  V:  Facial sensation V1-V3 equal and intact   VII: Face is symmetric with normal smile  XII: Tongue protrudes midline  Motor: Normal bulk and tone. No pronator drift noted. Strength bilateral upper extremity 5/5, bilateral lower extremities 5/5.  Rapid alternating movements intact and symmetric  Sensation: Intact to light touch bilaterally. No neglect or extinction on double simultaneous testing.  Coordination: No dysmetria on finger-to-nose bilaterally  Reflexes: Downgoing toes bilaterally   Gait: Narrow gait and steady, guarded    New medications on discharge: Aspirin 81, Atorvastatin 80mg  Further outpatient workup: Follow up with PCP for continued monitoring of Bp   Hospital course:  87y Female with PMH HTN, arthritis transferred from [facility] to [facility] 5/6/23 after sudden onset of headache, dizziness, and unsteady gait that began the morning of 5/6. NIHSS of 0 on arrival to [facility]. CTH negative for any acute intracranial pathology. CTA negative for high grade stenosis or LVO, mild ICAD noted. CTP with right cerebellar and bilateral posterior cerebral Tmax elevation, but delayed bolus curves. Not a candidate for any intervention. Found to be hypertensive to the 200's treated with IV pushes. Transferred to [facility] for further workup and stroke rule out. MRI negative for stroke. Echo completed, refer to below for results. Suspect hypertensive emergency as the etiology of symptoms. Restarted home blood pressure medications. Required titration up of Valsartan to persistently elevated SBPs. Nephrology consulted for further recommendations on BP management. Decreased home Atenolol dose to 25 mg as HR was in the 50s and started Nifedipine XL 30 mg PO. At time of discharge SBPs 133-149. Plan to continue ASA 81mg daily (small vessel disease on MRI) and statin (    During this hospital course, patient did not have a stroke    Patient had the following workup done in house:  CT Head: No acute intracranial hemorrhage, large demarcated territorial  infarction, or mass effect.  MR Head Non Contrast: No acute ischemia, small vessel ischemic disease  CT Angio Head: No small artery occlusion or high-grade stenosis.   Vertebral basilar system widely patent. Scattered sites of mild   atherosclerotic stenosis.  CT Angio Neck: No arterial high-grade stenosis, occlusive disease or   evidence of dissection. Moderate calcified plaque at each carotid   bifurcation.    [x]echo   1. Small left ventricular size.   2. Mild symmetric left ventricular hypertrophy.   3. Normal left ventricular systolic function.   4.Indeterminate left ventricular diastolic function.   5. Normal right ventricular size and systolic function.   6. Normal atria.   7. Moderate aortic regurgitation.   8. Mild mitral regurgitation.   9. Moderate pulmonic regurgitation.  10. No pericardial effusion.  11. There was insufficient tricuspid regurgitation detected from which to   calculate pulmonary artery systolic pressure.  12. No prior echo is available for comparison.    [x]labs    A1C: 5.6  TSH: 2.01      Physical exam at discharge:  General: No acute distress, awake and alert  Cardiovascular: Regular rate and rhythm on cardiac monitor  Pulmonary: No use of accessory muscles  GI: Abdomen soft, non-tender, non-distended    Neurologic:  -Mental status: Awake, alert, oriented to person, place, and time. Speech is fluent with intact naming, repetition, and comprehension, no dysarthria. Recent and remote memory intact. Follows commands. Attention/concentration intact. Fund of knowledge appropriate.  -Cranial nerves:   II: Visual fields are full to confrontation.  III, IV, VI: Extraocular movements are intact without nystagmus. Pupils equally round and reactive to light  V:  Facial sensation V1-V3 equal and intact   VII: Face is symmetric with normal smile  XII: Tongue protrudes midline  Motor: Normal bulk and tone. No pronator drift noted. Strength bilateral upper extremity 5/5, bilateral lower extremities 5/5.  Rapid alternating movements intact and symmetric  Sensation: Intact to light touch bilaterally. No neglect or extinction on double simultaneous testing.  Coordination: No dysmetria on finger-to-nose bilaterally  Reflexes: Downgoing toes bilaterally   Gait: Narrow gait and steady    Discharge NIHSS: 0    New medications on discharge: Aspirin 81, Atorvastatin 80mg  Further outpatient workup: Close f/u with PCP for BP monitoring   Hospital course:  87y Female with PMH HTN, arthritis transferred from [facility] to [facility] 5/6/23 after sudden onset of headache, dizziness, and unsteady gait that began the morning of 5/6. NIHSS of 0 on arrival to [facility]. CTH negative for any acute intracranial pathology. CTA negative for high grade stenosis or LVO, mild ICAD noted. CTP with right cerebellar and bilateral posterior cerebral Tmax elevation, but delayed bolus curves. Not a candidate for any intervention. Found to be hypertensive to the 200's treated with IV pushes. Transferred to [facility] for further workup and stroke rule out. MRI negative for stroke. Echo completed, refer to below for results. Suspect hypertensive emergency as the etiology of symptoms. Restarted home blood pressure medications. Required titration up of Valsartan to persistently elevated SBPs. Nephrology consulted for further recommendations on BP management. Decreased home Atenolol dose to 25 mg as HR was in the 50s and started Nifedipine XL 30 mg PO. At time of discharge SBPs 133-149. Plan to continue ASA 81mg daily (small vessel disease on MRI) and statin (    During this hospital course, patient did not have a stroke    Patient had the following workup done in house:  CT Head: No acute intracranial hemorrhage, large demarcated territorial  infarction, or mass effect.  MR Head Non Contrast: No acute ischemia, small vessel ischemic disease  CT Angio Head: No small artery occlusion or high-grade stenosis.   Vertebral basilar system widely patent. Scattered sites of mild   atherosclerotic stenosis.  CT Angio Neck: No arterial high-grade stenosis, occlusive disease or   evidence of dissection. Moderate calcified plaque at each carotid   bifurcation.    [x]echo   1. Small left ventricular size.   2. Mild symmetric left ventricular hypertrophy.   3. Normal left ventricular systolic function.   4.Indeterminate left ventricular diastolic function.   5. Normal right ventricular size and systolic function.   6. Normal atria.   7. Moderate aortic regurgitation.   8. Mild mitral regurgitation.   9. Moderate pulmonic regurgitation.  10. No pericardial effusion.  11. There was insufficient tricuspid regurgitation detected from which to   calculate pulmonary artery systolic pressure.  12. No prior echo is available for comparison.    [x]labs    A1C: 5.6  TSH: 2.01      Physical exam at discharge:  General: No acute distress, awake and alert  Cardiovascular: Regular rate and rhythm on cardiac monitor  Pulmonary: No use of accessory muscles  GI: Abdomen soft, non-tender, non-distended    Neurologic:  -Mental status: Awake, alert, oriented to person, place, and time. Speech is fluent with intact naming, repetition, and comprehension, no dysarthria. Recent and remote memory intact. Follows commands. Attention/concentration intact. Fund of knowledge appropriate.  -Cranial nerves:   II: Visual fields are full to confrontation.  III, IV, VI: Extraocular movements are intact without nystagmus. Pupils equally round and reactive to light  V:  Facial sensation V1-V3 equal and intact   VII: Face is symmetric with normal smile  XII: Tongue protrudes midline  Motor: Normal bulk and tone. No pronator drift noted. Strength bilateral upper extremity 5/5, bilateral lower extremities 5/5.  Rapid alternating movements intact and symmetric  Sensation: Intact to light touch bilaterally. No neglect or extinction on double simultaneous testing.  Coordination: No dysmetria on finger-to-nose bilaterally  Reflexes: Downgoing toes bilaterally   Gait: Deferred    Discharge NIHSS: 0    New medications on discharge: Aspirin 81 mg PO QD, Atorvastatin 40 mg PO QD, Atenolol 25 mg PO QD, Nifedipine XL 30 mg PO QD   Further outpatient workup: Close f/u with PCP for BP monitoring   Hospital course:  87y Female with PMH HTN, arthritis transferred from [facility] to [facility] 5/6/23 after sudden onset of headache, dizziness, and unsteady gait that began the morning of 5/6. NIHSS of 0 on arrival to [facility]. CTH negative for any acute intracranial pathology. CTA negative for high grade stenosis or LVO, mild ICAD noted. CTP with right cerebellar and bilateral posterior cerebral Tmax elevation, but delayed bolus curves. Not a candidate for any intervention. Found to be hypertensive to the 200's treated with IV pushes. Transferred to [facility] for further workup and stroke rule out. MRI negative for stroke. Echo completed, refer to below for results. Suspect hypertensive emergency as the etiology of symptoms. Restarted home blood pressure medications. Required titration up of Valsartan to persistently elevated SBPs. Nephrology consulted for further recommendations on BP management. Decreased home Atenolol dose to 25 mg as HR was in the 50s and started Nifedipine XL 30 mg PO. At time of discharge SBPs 133-149. Plan to continue ASA 81mg daily (small vessel disease on MRI) and statin (). PT recommending outpatient PT, OT with no needs.    During this hospital course, patient did not have a stroke    Patient had the following workup done in house:  CT Head: No acute intracranial hemorrhage, large demarcated territorial  infarction, or mass effect.  MR Head Non Contrast: No acute ischemia, small vessel ischemic disease  CT Angio Head: No small artery occlusion or high-grade stenosis.   Vertebral basilar system widely patent. Scattered sites of mild   atherosclerotic stenosis.  CT Angio Neck: No arterial high-grade stenosis, occlusive disease or   evidence of dissection. Moderate calcified plaque at each carotid   bifurcation.    [x]echo   1. Small left ventricular size.   2. Mild symmetric left ventricular hypertrophy.   3. Normal left ventricular systolic function.   4.Indeterminate left ventricular diastolic function.   5. Normal right ventricular size and systolic function.   6. Normal atria.   7. Moderate aortic regurgitation.   8. Mild mitral regurgitation.   9. Moderate pulmonic regurgitation.  10. No pericardial effusion.  11. There was insufficient tricuspid regurgitation detected from which to   calculate pulmonary artery systolic pressure.  12. No prior echo is available for comparison.    [x]labs    A1C: 5.6  TSH: 2.01      Physical exam at discharge:  General: No acute distress, awake and alert  Cardiovascular: Regular rate and rhythm on cardiac monitor  Pulmonary: No use of accessory muscles  GI: Abdomen soft, non-tender, non-distended    Neurologic:  -Mental status: Awake, alert, oriented to person, place, and time. Speech is fluent with intact naming, repetition, and comprehension, no dysarthria. Recent and remote memory intact. Follows commands. Attention/concentration intact. Fund of knowledge appropriate.  -Cranial nerves:   II: Visual fields are full to confrontation.  III, IV, VI: Extraocular movements are intact without nystagmus. Pupils equally round and reactive to light  V:  Facial sensation V1-V3 equal and intact   VII: Face is symmetric with normal smile  XII: Tongue protrudes midline  Motor: Normal bulk and tone. No pronator drift noted. Strength bilateral upper extremity 5/5, bilateral lower extremities 5/5.  Rapid alternating movements intact and symmetric  Sensation: Intact to light touch bilaterally. No neglect or extinction on double simultaneous testing.  Coordination: No dysmetria on finger-to-nose bilaterally  Reflexes: Downgoing toes bilaterally   Gait: Deferred    Discharge NIHSS: 0    New medications on discharge: Aspirin 81 mg PO QD, Atorvastatin 40 mg PO QD, Atenolol 25 mg PO QD, Nifedipine XL 30 mg PO QD   Further outpatient workup: Close f/u with PCP for BP monitoring   Hospital course:  87y Female with PMH HTN, arthritis transferred from [facility] to [facility] 5/6/23 after sudden onset of headache, dizziness, and unsteady gait that began the morning of 5/6. NIHSS of 0 on arrival to [facility]. CTH negative for any acute intracranial pathology. CTA negative for high grade stenosis or LVO, mild ICAD noted. CTP with right cerebellar and bilateral posterior cerebral Tmax elevation, but delayed bolus curves. Not a candidate for any intervention. Found to be hypertensive to the 200's treated with IV pushes. Transferred to [facility] for further workup and stroke rule out. MRI negative for stroke. Echo completed, refer to below for results. Suspect hypertensive emergency as the etiology of symptoms. Restarted home blood pressure medications. Required titration up of Valsartan to persistently elevated SBPs. Nephrology consulted for further recommendations on BP management. Decreased home Atenolol dose to 25 mg as HR was in the 50s and started Nifedipine XL 30 mg PO. At time of discharge SBPs 133-149 with a plan to titrate further in the outpatient setting. Plan to continue ASA 81mg daily (small vessel disease on MRI) and statin (). PT recommending outpatient PT, OT with no needs.    During this hospital course, patient did not have a stroke    Patient had the following workup done in house:  CT Head: No acute intracranial hemorrhage, large demarcated territorial  infarction, or mass effect.  MR Head Non Contrast: No acute ischemia, small vessel ischemic disease  CT Angio Head: No small artery occlusion or high-grade stenosis.   Vertebral basilar system widely patent. Scattered sites of mild   atherosclerotic stenosis.  CT Angio Neck: No arterial high-grade stenosis, occlusive disease or   evidence of dissection. Moderate calcified plaque at each carotid   bifurcation.    [x]echo   1. Small left ventricular size.   2. Mild symmetric left ventricular hypertrophy.   3. Normal left ventricular systolic function.   4.Indeterminate left ventricular diastolic function.   5. Normal right ventricular size and systolic function.   6. Normal atria.   7. Moderate aortic regurgitation.   8. Mild mitral regurgitation.   9. Moderate pulmonic regurgitation.  10. No pericardial effusion.  11. There was insufficient tricuspid regurgitation detected from which to   calculate pulmonary artery systolic pressure.  12. No prior echo is available for comparison.    [x]labs    A1C: 5.6  TSH: 2.01      Physical exam at discharge:  General: No acute distress, awake and alert  Cardiovascular: Regular rate and rhythm on cardiac monitor  Pulmonary: No use of accessory muscles  GI: Abdomen soft, non-tender, non-distended    Neurologic:  -Mental status: Awake, alert, oriented to person, place, and time. Speech is fluent with intact naming, repetition, and comprehension, no dysarthria. Recent and remote memory intact. Follows commands. Attention/concentration intact. Fund of knowledge appropriate.  -Cranial nerves:   II: Visual fields are full to confrontation.  III, IV, VI: Extraocular movements are intact without nystagmus. Pupils equally round and reactive to light  V:  Facial sensation V1-V3 equal and intact   VII: Face is symmetric with normal smile  XII: Tongue protrudes midline  Motor: Normal bulk and tone. No pronator drift noted. Strength bilateral upper extremity 5/5, bilateral lower extremities 5/5.  Rapid alternating movements intact and symmetric  Sensation: Intact to light touch bilaterally. No neglect or extinction on double simultaneous testing.  Coordination: No dysmetria on finger-to-nose bilaterally  Reflexes: Downgoing toes bilaterally   Gait: Deferred    Discharge NIHSS: 0    New medications on discharge: Aspirin 81 mg PO QD, Atorvastatin 40 mg PO QD, Atenolol 25 mg PO QD, Nifedipine XL 30 mg PO QD   Further outpatient workup: Close f/u with PCP for BP monitoring

## 2023-05-08 NOTE — PROGRESS NOTE ADULT - SUBJECTIVE AND OBJECTIVE BOX
Patient is a 87y old  Female who presents with a chief complaint of dizziness (08 May 2023 14:12)      INTERVAL HPI/OVERNIGHT EVENTS:    Pt. seen and examined earlier today  Pt. reports feeling better  Pt. denies F/C, CP, SOB, N/V    Review of Systems: 12 point review of systems otherwise negative    MEDICATIONS  (STANDING):  aspirin enteric coated 81 milliGRAM(s) Oral daily  atenolol  Tablet 50 milliGRAM(s) Oral daily  atorvastatin 80 milliGRAM(s) Oral at bedtime  enoxaparin Injectable 40 milliGRAM(s) SubCutaneous every 24 hours  ketotifen 0.025% Ophthalmic Solution 1 Drop(s) Both EYES daily  multivitamin 1 Tablet(s) Oral daily  valsartan 80 milliGRAM(s) Oral daily    MEDICATIONS  (PRN):  acetaminophen     Tablet .. 650 milliGRAM(s) Oral every 6 hours PRN Mild Pain (1 - 3)      Allergies    No Known Allergies    Intolerances          Vital Signs Last 24 Hrs  T(C): 36.5 (08 May 2023 05:38), Max: 36.8 (07 May 2023 18:01)  T(F): 97.7 (08 May 2023 05:38), Max: 98.3 (07 May 2023 18:01)  HR: 59 (08 May 2023 12:14) (51 - 78)  BP: 173/77 (08 May 2023 12:14) (147/68 - 209/91)  BP(mean): 110 (08 May 2023 12:14) (98 - 131)  RR: 18 (08 May 2023 12:14) (18 - 20)  SpO2: 98% (08 May 2023 12:14) (94% - 99%)    Parameters below as of 08 May 2023 12:14  Patient On (Oxygen Delivery Method): room air      CAPILLARY BLOOD GLUCOSE          05-07 @ 07:01  -  05-08 @ 07:00  --------------------------------------------------------  IN: 620 mL / OUT: 0 mL / NET: 620 mL    05-08 @ 07:01  -  05-08 @ 14:21  --------------------------------------------------------  IN: 140 mL / OUT: 0 mL / NET: 140 mL        Physical Exam:  (earlier today)  Daily     Daily   General:  well-appearing in NAD  HEENT:  MMM  CV:  RRR  Lungs:  CTA B/L  Abdomen:  soft NT ND  Extremities:  no edema B/L LE  Skin:  WWP  Neuro:  AAOx3, no dysarthria    LABS:                        13.4   3.46  )-----------( 228      ( 08 May 2023 05:30 )             39.9     05-08    136  |  103  |  12  ----------------------------<  99  3.9   |  22  |  0.57    Ca    8.8      08 May 2023 05:30  Phos  3.6     05-08  Mg     2.0     05-08

## 2023-05-08 NOTE — DISCHARGE NOTE PROVIDER - NSDCHHATTENDCERT_GEN_ALL_CORE
My signature below certifies that the above stated patient is homebound and upon completion of the Face-To-Face encounter, has the need for intermittent skilled nursing, physical therapy and/or speech or occupational therapy services in their home for their current diagnosis as outlined in their initial plan of care. These services will continue to be monitored by myself or another physician.
Consent: The patient's consent was obtained including but not limited to risks of crusting, scabbing, blistering, scarring, darker or lighter pigmentary change, recurrence, incomplete removal and infection.
Render Post-Care Instructions In Note?: no
Detail Level: Detailed
Post-Care Instructions: I reviewed with the patient in detail post-care instructions. Patient is to wear sunprotection, and avoid picking at any of the treated lesions. Pt may apply Vaseline to crusted or scabbing areas.
Duration Of Freeze Thaw-Cycle (Seconds): 0

## 2023-05-08 NOTE — PROGRESS NOTE ADULT - ASSESSMENT
87y Female with PMHx of HTN, arthritis transferred from [facility] to [facility] today after sudden onset of headache, dizziness, and unsteady gait that began this morning at 0930. NIHSS 0 on arrival to [facility]. CTH negative for any acute intracranial pathology. CTA negative for high grade stenosis or LVO. CTP with right cerebellar and bilateral posterior cerebral Tmax elevation, but delayed bolus curves. Not a candidate for any intervention. Hypertensive to the 200's treated with IV pushes. Transferred to [facility] for further workup and stroke rule out, mild right upper extremity pronation noted on exam, resolved this morning. MRI brain negative for stroke.     Neuro  #CVA workup  - continue aspirin 81mg daily  - start atorvastatin 80mg daily  - q4hr stroke neuro checks and vitals  - MRI Brain without contrast: negative   - orthostatic vitals negative   - Stroke Code HCT Results: Negative  - Stroke Code CTA Results: No LVO or HGS. Moderate calcified plaque at each carotid bifurcation. Scattered sites of mild atherosclerotic stenosis.  - Stroke education    Cards  #HTN  - Goal SBP <180  - continue home atenolol 50mg daily, valsartan 80mg daily   - TTE: negative   - Stroke Code EKG Results: NSR    #HLD  - high dose statin as above in CVA  - LDL results: 167    Pulm  - call provider if SPO2 < 94%    GI  #Nutrition/Fluids/Electrolytes   - replete K<4 and Mg <2  - Diet: DASH/TLC    Renal  - Patient reports some urinary frequency at night    Infectious Disease  - Afebrile, no leukocytosis     Endocrine  - A1C results: 5.6    - TSH results: 2.010    ENT   #seasonal allergies  - Continue home artificial tears    DVT Prophylaxis  - lovenox sq for DVT prophylaxis   - SCDs for DVT prophylaxis       IDR Goals: Goals reviewed at interdisciplinary rounds with case management, social work, physical therapy, occupational therapy, and speech language pathology.   Please see specific therapy  notes for in depth goals.  Dispo: Home PT/OT     Discussed daily hospital plans and goals with patient and family at bedside.     Discussed with Neurology Attending Dr. Christiana Linder 87y Female with PMHx of HTN, arthritis transferred from [facility] to [facility] today after sudden onset of headache, dizziness, and unsteady gait that began this morning at 0930. NIHSS 0 on arrival to [facility]. CTH negative for any acute intracranial pathology. CTA negative for high grade stenosis or LVO. CTP with right cerebellar and bilateral posterior cerebral Tmax elevation, but delayed bolus curves. Not a candidate for any intervention. Hypertensive to the 200's treated with IV pushes. Transferred to [facility] for further workup and stroke rule out, mild right upper extremity pronation noted on exam, resolved this morning. MRI brain negative for stroke. Course c/b hypertensive episodes up to 200s, pending blood pressure management. Episode likely due to hypertensive emergency.     Neuro  #CVA workup  - continue aspirin 81mg daily  - start atorvastatin 80mg daily  - q4hr stroke neuro checks and vitals  - MRI Brain without contrast: negative   - orthostatic vitals negative   - Stroke Code HCT Results: Negative  - Stroke Code CTA Results: No LVO or HGS. Moderate calcified plaque at each carotid bifurcation. Scattered sites of mild atherosclerotic stenosis.  - Stroke education    Cards  #HTN  - Goal SBP <180  - continue home atenolol 50mg daily, valsartan 80mg daily   - TTE: negative   - Stroke Code EKG Results: NSR    #HLD  - high dose statin as above in CVA  - LDL results: 167    Pulm  - call provider if SPO2 < 94%    GI  #Nutrition/Fluids/Electrolytes   - replete K<4 and Mg <2  - Diet: DASH/TLC    Renal  - Patient reports some urinary frequency at night    Infectious Disease  - Afebrile, no leukocytosis     Endocrine  - A1C results: 5.6    - TSH results: 2.010    ENT   #seasonal allergies  - Continue home artificial tears    DVT Prophylaxis  - lovenox sq for DVT prophylaxis   - SCDs for DVT prophylaxis       IDR Goals: Goals reviewed at interdisciplinary rounds with case management, social work, physical therapy, occupational therapy, and speech language pathology.   Please see specific therapy  notes for in depth goals.  Dispo: Home PT/OT     Discussed daily hospital plans and goals with patient and family at bedside.     Discussed with Neurology Attending Dr. Vallejo, Stroke Fellow Dr. Tamayo

## 2023-05-08 NOTE — DISCHARGE NOTE PROVIDER - NSDCCPCAREPLAN_GEN_ALL_CORE_FT
PRINCIPAL DISCHARGE DIAGNOSIS  Diagnosis: Hypertensive emergency  Assessment and Plan of Treatment: A hypertensive crisis is a sudden spike in blood pressure to 180/120 or higher. A normal blood pressure is 119/79 or lower. A hypertensive crisis is also known as acute hypertension. This is a medical emergency that could lead to organ damage or be life-threatening.  Take your medicine as directed. Contact your healthcare provider if you think your medicine is not helping or if you have side effects. Tell your provider if you are allergic to any medicine. Keep a list of the medicines, vitamins, and herbs you take. Include the amounts, and when and why you take them. Bring the list or the pill bottles to follow-up visits. Carry your medicine list with you in case of an emergency.  Follow up with your healthcare provider within 1 week or as directed: You will need to return to have your blood pressure checked and other tests. Your healthcare provider may also refer to you a cardiologist. Write down your questions so you remember to ask them during your visits.  Prevent another hypertensive crisis:  Check your blood pressure at home. Sit and rest for 5 minutes before you take your blood pressure. Extend your arm and support it on a flat surface. Your arm should be at the same level as your heart. Follow the directions that came with your blood pressure monitor. If possible, take at least 2 blood pressure readings each time. Take your blood pressure at least twice a day at the same times each day, such as morning and evening. Keep a record of your readings and bring it to your follow-up visits. Ask your healthcare provider what your blood pressure should be.  Follow the meal plan recommended by your healthcare provider. A dietitian or your provider can give you more information on low-sodium plans or the DASH (Dietary Approaches to Stop Hypertension) eating plan. The DASH plan is low in sodium, unhealthy fats, and total fat. It is high in potassium, calcium, and fiber.

## 2023-05-08 NOTE — PROVIDER CONTACT NOTE (OTHER) - ASSESSMENT
Patient is asymptomatic and feels fine.
bp 118/59
pt mental status at baseline. pt denies headache. VS in flowsheet
Patient is asymptomatic
bp 189/79
pt mental status at baseline. pt denies headache.  .88, , HR 51, RR 20, SaO2 97 on room air.

## 2023-05-09 PROCEDURE — 99233 SBSQ HOSP IP/OBS HIGH 50: CPT

## 2023-05-09 RX ORDER — VALSARTAN 80 MG/1
80 TABLET ORAL ONCE
Refills: 0 | Status: COMPLETED | OUTPATIENT
Start: 2023-05-09 | End: 2023-05-09

## 2023-05-09 RX ORDER — VALSARTAN 80 MG/1
160 TABLET ORAL DAILY
Refills: 0 | Status: DISCONTINUED | OUTPATIENT
Start: 2023-05-10 | End: 2023-05-10

## 2023-05-09 RX ADMIN — ATORVASTATIN CALCIUM 80 MILLIGRAM(S): 80 TABLET, FILM COATED ORAL at 21:56

## 2023-05-09 RX ADMIN — ENOXAPARIN SODIUM 40 MILLIGRAM(S): 100 INJECTION SUBCUTANEOUS at 05:53

## 2023-05-09 RX ADMIN — KETOTIFEN FUMARATE 1 DROP(S): 0.34 SOLUTION OPHTHALMIC at 12:45

## 2023-05-09 RX ADMIN — Medication 650 MILLIGRAM(S): at 07:22

## 2023-05-09 RX ADMIN — VALSARTAN 80 MILLIGRAM(S): 80 TABLET ORAL at 05:52

## 2023-05-09 RX ADMIN — Medication 81 MILLIGRAM(S): at 12:37

## 2023-05-09 RX ADMIN — Medication 1 TABLET(S): at 12:37

## 2023-05-09 RX ADMIN — ATENOLOL 50 MILLIGRAM(S): 25 TABLET ORAL at 05:53

## 2023-05-09 RX ADMIN — VALSARTAN 80 MILLIGRAM(S): 80 TABLET ORAL at 12:45

## 2023-05-09 RX ADMIN — Medication 650 MILLIGRAM(S): at 07:57

## 2023-05-09 NOTE — PROGRESS NOTE ADULT - ASSESSMENT
87y Female with PMHx of HTN, arthritis transferred from [facility] to [facility] today after sudden onset of headache, dizziness, and unsteady gait that began this morning at 0930. NIHSS 0 on arrival to [facility]. CTH negative for any acute intracranial pathology. CTA negative for high grade stenosis or LVO. CTP with right cerebellar and bilateral posterior cerebral Tmax elevation, but delayed bolus curves. Not a candidate for any intervention. Hypertensive to the 200's treated with IV pushes. Transferred to [facility] for further workup and stroke rule out, mild right upper extremity pronation noted on exam, resolved this morning. MRI brain negative for stroke. Course c/b hypertensive episodes up to 200s, pending blood pressure management. Episode likely due to hypertensive emergency.     Neuro  #CVA workup - MRI negative for stroke likely hypertensive emergency   - continue aspirin 81mg daily  - decrease atorvastatin to 40mg daily  - q4hr stroke neuro checks and vitals  - MRI Brain without contrast: negative   - orthostatic vitals negative   - Stroke Code HCT Results: Negative  - Stroke Code CTA Results: No LVO or HGS. Moderate calcified plaque at each carotid bifurcation. Scattered sites of mild atherosclerotic stenosis.  - Stroke education    Cards  #HTN  - Goal SBP <180  - continue home atenolol 50mg daily  - increase home valsartan 160mg daily starting 5/10 AM   - TTE: negative   - Stroke Code EKG Results: NSR    #HLD  - high dose statin as above in CVA  - LDL results: 167    Pulm  - call provider if SPO2 < 94%    GI  #Nutrition/Fluids/Electrolytes   - replete K<4 and Mg <2  - Diet: DASH/TLC    Renal  - Patient reports some urinary frequency at night    Infectious Disease  - Afebrile, no leukocytosis     Endocrine  - A1C results: 5.6    - TSH results: 2.010    ENT   #seasonal allergies  - Continue home artificial tears    DVT Prophylaxis  - lovenox sq for DVT prophylaxis   - SCDs for DVT prophylaxis       IDR Goals: Goals reviewed at interdisciplinary rounds with case management, social work, physical therapy, occupational therapy, and speech language pathology.   Please see specific therapy  notes for in depth goals.  Dispo: Home PT/OT, pending discharge in AM with BP control      Discussed daily hospital plans and goals with patient and family at bedside.     Discussed with Neurology Attending Dr. Vallejo, Stroke Fellow Dr. Tamayo

## 2023-05-09 NOTE — PROGRESS NOTE ADULT - SUBJECTIVE AND OBJECTIVE BOX
Neurology Stroke Progress Note    INTERVAL HPI/OVERNIGHT EVENTS:  Patient seen and examined.  number ______ used.    MEDICATIONS  (STANDING):  aspirin enteric coated 81 milliGRAM(s) Oral daily  atenolol  Tablet 50 milliGRAM(s) Oral daily  atorvastatin 80 milliGRAM(s) Oral at bedtime  enoxaparin Injectable 40 milliGRAM(s) SubCutaneous every 24 hours  ketotifen 0.025% Ophthalmic Solution 1 Drop(s) Both EYES daily  multivitamin 1 Tablet(s) Oral daily    MEDICATIONS  (PRN):  acetaminophen     Tablet .. 650 milliGRAM(s) Oral every 6 hours PRN Mild Pain (1 - 3)      Allergies    No Known Allergies    Intolerances    Vital Signs Last 24 Hrs  T(C): 36.9 (09 May 2023 17:37), Max: 36.9 (09 May 2023 05:54)  T(F): 98.5 (09 May 2023 17:37), Max: 98.5 (09 May 2023 17:37)  HR: 65 (09 May 2023 16:52) (52 - 67)  BP: 183/71 (09 May 2023 16:52) (134/63 - 183/71)  BP(mean): 116 (09 May 2023 16:52) (88 - 116)  RR: 17 (09 May 2023 16:52) (17 - 34)  SpO2: 99% (09 May 2023 16:52) (97% - 100%)    Parameters below as of 09 May 2023 16:52  Patient On (Oxygen Delivery Method): room air        Physical exam:  General: No acute distress, awake and alert  Eyes: Anicteric sclerae, moist conjunctivae, see below for CNs  Neck: trachea midline, FROM, supple, no thyromegaly or lymphadenopathy  Cardiovascular: Regular rate and rhythm, no murmurs, rubs, or gallops. No carotid bruits.   Pulmonary: Anterior breath sounds clear bilaterally, no crackles or wheezing. No use of accessory muscles  GI: Abdomen soft, non-distended, non-tender  Extremities: Radial and DP pulses +2, no edema    Neurologic:  -Mental status: Awake, alert, oriented to person, place, and time. Speech is fluent with intact naming, repetition, and comprehension, no dysarthria. Recent and remote memory intact. Follows commands. Attention/concentration intact. Fund of knowledge appropriate.  -Cranial nerves:   II: Visual fields are full to confrontation.  III, IV, VI: Extraocular movements are intact without nystagmus. Pupils equally round and reactive to light  V:  Facial sensation V1-V3 equal and intact   VII: Face is symmetric with normal eye closure and smile  VIII: Hearing is bilaterally intact to finger rub  IX, X: Uvula is midline and soft palate rises symmetrically  XI: Head turning and shoulder shrug are intact.  XII: Tongue protrudes midline  Motor: Normal bulk and tone. No pronator drift. Strength bilateral upper extremity 5/5, bilateral lower extremities 5/5.  Rapid alternating movements intact and symmetric  Sensation: Intact to light touch bilaterally. No neglect or extinction on double simultaneous testing.  Coordination: No dysmetria on finger-to-nose and heel-to-shin bilaterally  Reflexes: Downgoing toes bilaterally   Gait: Narrow gait and steady    LABS:                        13.4   3.46  )-----------( 228      ( 08 May 2023 05:30 )             39.9     05-08    136  |  103  |  12  ----------------------------<  99  3.9   |  22  |  0.57    Ca    8.8      08 May 2023 05:30  Phos  3.6     05-08  Mg     2.0     05-08            RADIOLOGY & ADDITIONAL TESTS:     Neurology Stroke Progress Note    INTERVAL HPI/OVERNIGHT EVENTS:  No acute overnight events. Patient seen and examined. States she is feeling great, is anticipating discharge. Pt denies acute complaints at this time.     MEDICATIONS  (STANDING):  aspirin enteric coated 81 milliGRAM(s) Oral daily  atenolol  Tablet 50 milliGRAM(s) Oral daily  atorvastatin 80 milliGRAM(s) Oral at bedtime  enoxaparin Injectable 40 milliGRAM(s) SubCutaneous every 24 hours  ketotifen 0.025% Ophthalmic Solution 1 Drop(s) Both EYES daily  multivitamin 1 Tablet(s) Oral daily    MEDICATIONS  (PRN):  acetaminophen     Tablet .. 650 milliGRAM(s) Oral every 6 hours PRN Mild Pain (1 - 3)      Allergies    No Known Allergies    Intolerances    Vital Signs Last 24 Hrs  T(C): 36.9 (09 May 2023 17:37), Max: 36.9 (09 May 2023 05:54)  T(F): 98.5 (09 May 2023 17:37), Max: 98.5 (09 May 2023 17:37)  HR: 65 (09 May 2023 16:52) (52 - 67)  BP: 183/71 (09 May 2023 16:52) (134/63 - 183/71)  BP(mean): 116 (09 May 2023 16:52) (88 - 116)  RR: 17 (09 May 2023 16:52) (17 - 34)  SpO2: 99% (09 May 2023 16:52) (97% - 100%)    Parameters below as of 09 May 2023 16:52  Patient On (Oxygen Delivery Method): room air      Physical Exam:   General: No acute distress, awake and alert  Cardiovascular: Regular rate and rhythm  Pulmonary: No use of accessory muscles  GI: Abdomen soft, non-tender, non-distended    Neurologic:  -Mental status: Awake, alert, oriented to person, place, and time. Speech is fluent with intact naming, repetition, and comprehension, no dysarthria. Recent and remote memory intact. Follows commands. Attention/concentration intact. Fund of knowledge appropriate.  -Cranial nerves:   II: Visual fields are full to confrontation.  III, IV, VI: Extraocular movements are intact without nystagmus. Pupils equally round and reactive to light  V:  Facial sensation V1-V3 equal and intact   VII: Face is symmetric with normal smile  XII: Tongue protrudes midline  Motor: Normal bulk and tone. No pronator drift noted. Strength bilateral upper extremity 5/5, bilateral lower extremities 5/5.  Rapid alternating movements intact and symmetric  Sensation: Intact to light touch bilaterally. No neglect or extinction on double simultaneous testing.  Coordination: No dysmetria on finger-to-nose bilaterally  Reflexes: Downgoing toes bilaterally   Gait: Narrow gait and steady, guarded    LABS:                        13.4   3.46  )-----------( 228      ( 08 May 2023 05:30 )             39.9     05-08    136  |  103  |  12  ----------------------------<  99  3.9   |  22  |  0.57    Ca    8.8      08 May 2023 05:30  Phos  3.6     05-08  Mg     2.0     05-08      RADIOLOGY & ADDITIONAL TESTS:    < from: TTE Echo Complete w/o Contrast w/ Doppler (05.08.23 @ 13:26) >  CONCLUSIONS:     1. Small left ventricular size.   2. Mild symmetric left ventricular hypertrophy.   3. Normal left ventricular systolic function.   4.Indeterminate left ventricular diastolic function.   5. Normal right ventricular size and systolic function.   6. Normal atria.   7. Moderate aortic regurgitation.   8. Mild mitral regurgitation.   9. Moderate pulmonic regurgitation.  10. No pericardial effusion.  11. There was insufficient tricuspid regurgitation detected from which to   calculate pulmonary artery systolic pressure.  12. No prior echo is available for comparison.    < end of copied text >

## 2023-05-09 NOTE — PROGRESS NOTE ADULT - SUBJECTIVE AND OBJECTIVE BOX
Patient is a 87y old  Female who presents with a chief complaint of dizziness (08 May 2023 14:20)      INTERVAL HPI/OVERNIGHT EVENTS:    Pt. seen and examined earlier today  Pt. reports feeling much better  Pt. denies F/C, CP, SOB    Review of Systems: 12 point review of systems otherwise negative    MEDICATIONS  (STANDING):  aspirin enteric coated 81 milliGRAM(s) Oral daily  atenolol  Tablet 50 milliGRAM(s) Oral daily  atorvastatin 80 milliGRAM(s) Oral at bedtime  enoxaparin Injectable 40 milliGRAM(s) SubCutaneous every 24 hours  ketotifen 0.025% Ophthalmic Solution 1 Drop(s) Both EYES daily  multivitamin 1 Tablet(s) Oral daily  valsartan 80 milliGRAM(s) Oral daily    MEDICATIONS  (PRN):  acetaminophen     Tablet .. 650 milliGRAM(s) Oral every 6 hours PRN Mild Pain (1 - 3)      Allergies    No Known Allergies    Intolerances          Vital Signs Last 24 Hrs  T(C): 36.4 (09 May 2023 09:42), Max: 36.9 (09 May 2023 05:54)  T(F): 97.5 (09 May 2023 09:42), Max: 98.4 (09 May 2023 05:54)  HR: 60 (09 May 2023 09:15) (57 - 79)  BP: 144/62 (09 May 2023 09:15) (134/63 - 199/96)  BP(mean): 88 (09 May 2023 09:15) (88 - 138)  RR: 18 (09 May 2023 09:15) (18 - 20)  SpO2: 99% (09 May 2023 09:15) (95% - 99%)    Parameters below as of 09 May 2023 09:15  Patient On (Oxygen Delivery Method): room air      CAPILLARY BLOOD GLUCOSE          05-08 @ 07:01  -  05-09 @ 07:00  --------------------------------------------------------  IN: 140 mL / OUT: 500 mL / NET: -360 mL    05-09 @ 07:01  -  05-09 @ 11:08  --------------------------------------------------------  IN: 320 mL / OUT: 0 mL / NET: 320 mL        Physical Exam:  (earlier today)  Daily     Daily   General:  well-appearing in NAD  HEENT:  MMM  CV:  RRR  Lungs:  CTA B/L  Abdomen:  soft NT ND  Extremities:  no edema B/L LE  Skin:  WWP  Neuro:  AAOx3, no dysarthria    LABS:                        13.4   3.46  )-----------( 228      ( 08 May 2023 05:30 )             39.9     05-08    136  |  103  |  12  ----------------------------<  99  3.9   |  22  |  0.57    Ca    8.8      08 May 2023 05:30  Phos  3.6     05-08  Mg     2.0     05-08

## 2023-05-10 PROCEDURE — 99233 SBSQ HOSP IP/OBS HIGH 50: CPT

## 2023-05-10 RX ORDER — HYDRALAZINE HCL 50 MG
10 TABLET ORAL ONCE
Refills: 0 | Status: COMPLETED | OUTPATIENT
Start: 2023-05-10 | End: 2023-05-10

## 2023-05-10 RX ORDER — LABETALOL HCL 100 MG
10 TABLET ORAL ONCE
Refills: 0 | Status: DISCONTINUED | OUTPATIENT
Start: 2023-05-10 | End: 2023-05-10

## 2023-05-10 RX ORDER — VALSARTAN 80 MG/1
240 TABLET ORAL DAILY
Refills: 0 | Status: DISCONTINUED | OUTPATIENT
Start: 2023-05-11 | End: 2023-05-12

## 2023-05-10 RX ORDER — VALSARTAN 80 MG/1
80 TABLET ORAL ONCE
Refills: 0 | Status: COMPLETED | OUTPATIENT
Start: 2023-05-10 | End: 2023-05-10

## 2023-05-10 RX ADMIN — Medication 81 MILLIGRAM(S): at 12:34

## 2023-05-10 RX ADMIN — Medication 650 MILLIGRAM(S): at 06:38

## 2023-05-10 RX ADMIN — Medication 650 MILLIGRAM(S): at 07:18

## 2023-05-10 RX ADMIN — VALSARTAN 160 MILLIGRAM(S): 80 TABLET ORAL at 06:36

## 2023-05-10 RX ADMIN — KETOTIFEN FUMARATE 1 DROP(S): 0.34 SOLUTION OPHTHALMIC at 12:34

## 2023-05-10 RX ADMIN — Medication 10 MILLIGRAM(S): at 04:44

## 2023-05-10 RX ADMIN — Medication 1 TABLET(S): at 12:34

## 2023-05-10 RX ADMIN — ATORVASTATIN CALCIUM 80 MILLIGRAM(S): 80 TABLET, FILM COATED ORAL at 22:40

## 2023-05-10 RX ADMIN — ENOXAPARIN SODIUM 40 MILLIGRAM(S): 100 INJECTION SUBCUTANEOUS at 05:48

## 2023-05-10 RX ADMIN — ATENOLOL 50 MILLIGRAM(S): 25 TABLET ORAL at 05:48

## 2023-05-10 RX ADMIN — VALSARTAN 80 MILLIGRAM(S): 80 TABLET ORAL at 12:34

## 2023-05-10 NOTE — PROGRESS NOTE ADULT - SUBJECTIVE AND OBJECTIVE BOX
Neurology Stroke Progress Note    INTERVAL HPI/OVERNIGHT EVENTS:  Required hydral 5mg overnight for BP in 170s. Patient seen and examined this AM. Is anticipating discharge pending blood pressure management, denies acute complaints.     MEDICATIONS  (STANDING):  aspirin enteric coated 81 milliGRAM(s) Oral daily  atenolol  Tablet 50 milliGRAM(s) Oral daily  atorvastatin 80 milliGRAM(s) Oral at bedtime  enoxaparin Injectable 40 milliGRAM(s) SubCutaneous every 24 hours  ketotifen 0.025% Ophthalmic Solution 1 Drop(s) Both EYES daily  multivitamin 1 Tablet(s) Oral daily    MEDICATIONS  (PRN):  acetaminophen     Tablet .. 650 milliGRAM(s) Oral every 6 hours PRN Mild Pain (1 - 3)      Allergies    No Known Allergies    Intolerances        Vital Signs Last 24 Hrs  T(C): 37 (10 May 2023 18:15), Max: 37 (10 May 2023 18:15)  T(F): 98.6 (10 May 2023 18:15), Max: 98.6 (10 May 2023 18:15)  HR: 60 (10 May 2023 20:10) (57 - 80)  BP: 150/67 (10 May 2023 20:10) (144/70 - 192/86)  BP(mean): 96 (10 May 2023 20:10) (96 - 123)  RR: 18 (10 May 2023 20:10) (17 - 20)  SpO2: 99% (10 May 2023 20:10) (96% - 99%)    Parameters below as of 10 May 2023 20:10  Patient On (Oxygen Delivery Method): room air      Physical Exam:   General: No acute distress, awake and alert  Cardiovascular: Regular rate and rhythm  Pulmonary: No use of accessory muscles  GI: Abdomen soft, non-tender, non-distended    Neurologic:  -Mental status: Awake, alert, oriented to person, place, and time. Speech is fluent with intact naming, repetition, and comprehension, no dysarthria. Recent and remote memory intact. Follows commands. Attention/concentration intact. Fund of knowledge appropriate.  -Cranial nerves:   II: Visual fields are full to confrontation.  III, IV, VI: Extraocular movements are intact without nystagmus. Pupils equally round and reactive to light  V:  Facial sensation V1-V3 equal and intact   VII: Face is symmetric with normal smile  XII: Tongue protrudes midline  Motor: Normal bulk and tone. No pronator drift noted. Strength bilateral upper extremity 5/5, bilateral lower extremities 5/5.  Rapid alternating movements intact and symmetric  Sensation: Intact to light touch bilaterally. No neglect or extinction on double simultaneous testing.  Coordination: No dysmetria on finger-to-nose bilaterally  Reflexes: Downgoing toes bilaterally   Gait: Narrow gait and steady, guarded        LABS:          RADIOLOGY & ADDITIONAL TESTS:

## 2023-05-10 NOTE — PROGRESS NOTE ADULT - SUBJECTIVE AND OBJECTIVE BOX
Patient is a 87y old  Female who presents with a chief complaint of dizziness (09 May 2023 18:57)      INTERVAL HPI/OVERNIGHT EVENTS:    Pt. seen and examined earlier today  Pt. feels well, has no complaints  Pt. denies F/C, CP, SOB, N/V, HA, blurry vision    Review of Systems: 12 point review of systems otherwise negative    MEDICATIONS  (STANDING):  aspirin enteric coated 81 milliGRAM(s) Oral daily  atenolol  Tablet 50 milliGRAM(s) Oral daily  atorvastatin 80 milliGRAM(s) Oral at bedtime  enoxaparin Injectable 40 milliGRAM(s) SubCutaneous every 24 hours  ketotifen 0.025% Ophthalmic Solution 1 Drop(s) Both EYES daily  multivitamin 1 Tablet(s) Oral daily  valsartan 160 milliGRAM(s) Oral daily    MEDICATIONS  (PRN):  acetaminophen     Tablet .. 650 milliGRAM(s) Oral every 6 hours PRN Mild Pain (1 - 3)      Allergies    No Known Allergies    Intolerances          Vital Signs Last 24 Hrs  T(C): 36.6 (10 May 2023 12:32), Max: 36.9 (09 May 2023 17:37)  T(F): 97.8 (10 May 2023 12:32), Max: 98.5 (09 May 2023 17:37)  HR: 61 (10 May 2023 12:32) (57 - 80)  BP: 169/72 (10 May 2023 12:32) (144/70 - 192/86)  BP(mean): 104 (10 May 2023 12:32) (96 - 123)  RR: 17 (10 May 2023 12:32) (17 - 20)  SpO2: 98% (10 May 2023 12:32) (96% - 99%)    Parameters below as of 10 May 2023 12:32  Patient On (Oxygen Delivery Method): room air      CAPILLARY BLOOD GLUCOSE          05-09 @ 07:01  -  05-10 @ 07:00  --------------------------------------------------------  IN: 320 mL / OUT: 650 mL / NET: -330 mL        Physical Exam:  (earlier today)  Daily     Daily   General:  well-appearing in NAD  HEENT:  MMM  CV:  RRR  Lungs:  CTA B/L  Abdomen:  soft NT ND  Extremities:  no edema B/L LE  Skin:  WWP  Neuro:  AAOx3, no dysarthria    LABS:

## 2023-05-10 NOTE — PROGRESS NOTE ADULT - ASSESSMENT
87y Female with PMHx of HTN, arthritis transferred from [facility] to [facility] today after sudden onset of headache, dizziness, and unsteady gait that began this morning at 0930. NIHSS 0 on arrival to [facility]. CTH negative for any acute intracranial pathology. CTA negative for high grade stenosis or LVO. CTP with right cerebellar and bilateral posterior cerebral Tmax elevation, but delayed bolus curves. Not a candidate for any intervention. Hypertensive to the 200's treated with IV pushes. Transferred to [facility] for further workup and stroke rule out, mild right upper extremity pronation noted on exam, resolved this morning. MRI brain negative for stroke. Course c/b hypertensive episodes up to 200s, pending blood pressure management. Episode likely due to hypertensive emergency.     Neuro  #CVA workup - MRI negative for stroke likely hypertensive emergency   - continue aspirin 81mg daily  - decrease atorvastatin to 40mg daily  - q4hr stroke neuro checks and vitals  - MRI Brain without contrast: negative   - orthostatic vitals negative   - Stroke Code HCT Results: Negative  - Stroke Code CTA Results: No LVO or HGS. Moderate calcified plaque at each carotid bifurcation. Scattered sites of mild atherosclerotic stenosis.  - Stroke education    Cards  #HTN  - Goal SBP normotension <160   - continue home atenolol 50mg daily  - increase home valsartan 240mg daily starting 5/11 AM   - TTE: negative   - Stroke Code EKG Results: NSR    #HLD  - high dose statin as above in CVA  - LDL results: 167    Pulm  - call provider if SPO2 < 94%    GI  #Nutrition/Fluids/Electrolytes   - replete K<4 and Mg <2  - Diet: DASH/TLC    Renal  - Patient reports some urinary frequency at night    Infectious Disease  - Afebrile, no leukocytosis     Endocrine  - A1C results: 5.6    - TSH results: 2.010    ENT   #seasonal allergies  - Continue home artificial tears    DVT Prophylaxis  - lovenox sq for DVT prophylaxis   - SCDs for DVT prophylaxis       IDR Goals: Goals reviewed at interdisciplinary rounds with case management, social work, physical therapy, occupational therapy, and speech language pathology.   Please see specific therapy  notes for in depth goals.  Dispo: Home PT/OT, pending discharge in AM with BP control      Discussed daily hospital plans and goals with patient and family at bedside.     Discussed with Neurology Attending Dr. Vallejo, Stroke Fellow Dr. Tamayo

## 2023-05-11 PROCEDURE — 99233 SBSQ HOSP IP/OBS HIGH 50: CPT

## 2023-05-11 PROCEDURE — 99232 SBSQ HOSP IP/OBS MODERATE 35: CPT

## 2023-05-11 RX ORDER — ATENOLOL 25 MG/1
25 TABLET ORAL DAILY
Refills: 0 | Status: DISCONTINUED | OUTPATIENT
Start: 2023-05-11 | End: 2023-05-12

## 2023-05-11 RX ORDER — ATORVASTATIN CALCIUM 80 MG/1
40 TABLET, FILM COATED ORAL AT BEDTIME
Refills: 0 | Status: DISCONTINUED | OUTPATIENT
Start: 2023-05-11 | End: 2023-05-12

## 2023-05-11 RX ORDER — NIFEDIPINE 30 MG
30 TABLET, EXTENDED RELEASE 24 HR ORAL DAILY
Refills: 0 | Status: DISCONTINUED | OUTPATIENT
Start: 2023-05-11 | End: 2023-05-12

## 2023-05-11 RX ADMIN — Medication 81 MILLIGRAM(S): at 11:18

## 2023-05-11 RX ADMIN — Medication 1 TABLET(S): at 11:18

## 2023-05-11 RX ADMIN — VALSARTAN 240 MILLIGRAM(S): 80 TABLET ORAL at 05:18

## 2023-05-11 RX ADMIN — ENOXAPARIN SODIUM 40 MILLIGRAM(S): 100 INJECTION SUBCUTANEOUS at 06:29

## 2023-05-11 RX ADMIN — ATORVASTATIN CALCIUM 40 MILLIGRAM(S): 80 TABLET, FILM COATED ORAL at 21:59

## 2023-05-11 RX ADMIN — Medication 30 MILLIGRAM(S): at 15:33

## 2023-05-11 RX ADMIN — ATENOLOL 50 MILLIGRAM(S): 25 TABLET ORAL at 05:18

## 2023-05-11 RX ADMIN — KETOTIFEN FUMARATE 1 DROP(S): 0.34 SOLUTION OPHTHALMIC at 12:09

## 2023-05-11 NOTE — PROGRESS NOTE ADULT - SUBJECTIVE AND OBJECTIVE BOX
Neurology Stroke Progress Note    INTERVAL HPI/OVERNIGHT EVENTS:  Patient seen and examined. O/N AM Valsartan dose given early for . This AM, patient feeling well and offers no complaints at this time. SBP continues to fluctuate on current BP regimen, will consult nephrology for further recommendations.    MEDICATIONS  (STANDING):  aspirin enteric coated 81 milliGRAM(s) Oral daily  atenolol  Tablet 25 milliGRAM(s) Oral daily  atorvastatin 80 milliGRAM(s) Oral at bedtime  enoxaparin Injectable 40 milliGRAM(s) SubCutaneous every 24 hours  ketotifen 0.025% Ophthalmic Solution 1 Drop(s) Both EYES daily  multivitamin 1 Tablet(s) Oral daily  NIFEdipine XL 30 milliGRAM(s) Oral daily  valsartan 240 milliGRAM(s) Oral daily    MEDICATIONS  (PRN):  acetaminophen     Tablet .. 650 milliGRAM(s) Oral every 6 hours PRN Mild Pain (1 - 3)      Allergies    No Known Allergies    Intolerances        Vital Signs Last 24 Hrs  T(C): 37.1 (11 May 2023 16:56), Max: 37.1 (11 May 2023 16:56)  T(F): 98.7 (11 May 2023 16:56), Max: 98.7 (11 May 2023 16:56)  HR: 55 (11 May 2023 14:02) (50 - 65)  BP: 144/65 (11 May 2023 14:02) (116/55 - 183/82)  BP(mean): 93 (11 May 2023 14:02) (78 - 118)  RR: 20 (11 May 2023 14:02) (18 - 26)  SpO2: 96% (11 May 2023 14:02) (96% - 99%)    Parameters below as of 11 May 2023 14:02  Patient On (Oxygen Delivery Method): room air        Physical exam:  General: No acute distress, awake and alert  Eyes: Anicteric sclerae, moist conjunctivae, see below for CNs  Cardiovascular: Regular rate and rhythm on cardiac monitor  Extremities: No edema    Neurologic:  -Mental status: Awake, alert, oriented to person, place, and time. Speech is fluent with intact naming, repetition, and comprehension, no dysarthria. Recent and remote memory intact. Follows commands. Attention/concentration intact. Fund of knowledge appropriate.  -Cranial nerves:   II: Visual fields are full to confrontation.  III, IV, VI: Extraocular movements are intact without nystagmus. Pupils equally round and reactive to light  V:  Facial sensation V1-V3 equal and intact   VII: Face is symmetric with normal eye closure and smile  IX, X: Uvula is midline   XI: Head turning and shoulder shrug are intact.  XII: Tongue protrudes midline  Motor: Normal bulk and tone. No pronator drift. Strength bilateral upper extremity 5/5, bilateral lower extremities 5/5.  Sensation: Intact to light touch bilaterally. No neglect or extinction on double simultaneous testing.  Coordination: No dysmetria on finger-to-nose and heel-to-shin bilaterally  Gait: Deferred    LABS:                RADIOLOGY & ADDITIONAL TESTS: Reviewed.

## 2023-05-11 NOTE — PROVIDER CONTACT NOTE (CHANGE IN STATUS NOTIFICATION) - BACKGROUND
CTA negative for high grade stenosis or LVO. CTP with right cerebellar and bilateral posterior cerebral Tmax elevation, but delayed bolus curves. Not a candidate for any intervention. Hypertensive to the 200's treated with IV pushes. Transferred to [facility] for further workup and stroke rule out, mild right upper extremity pronation noted on exam.

## 2023-05-11 NOTE — PROGRESS NOTE ADULT - ASSESSMENT
87y Female with PMHx of HTN, arthritis transferred from [facility] to [facility] today after sudden onset of headache, dizziness, and unsteady gait that began this morning at 0930. NIHSS 0 on arrival to [facility]. CTH negative for any acute intracranial pathology. CTA negative for high grade stenosis or LVO. CTP with right cerebellar and bilateral posterior cerebral Tmax elevation, but delayed bolus curves. Not a candidate for any intervention. Hypertensive to the 200's treated with IV pushes. Transferred to [facility] for further workup and stroke rule out, mild right upper extremity pronation noted on exam, resolved this morning. MRI brain negative for stroke. Course c/b hypertensive episodes up to 200s, pending blood pressure management. Episode likely due to hypertensive emergency.     Neuro  #CVA workup - MRI negative for stroke likely hypertensive emergency   - continue aspirin 81mg daily  - continue atorvastatin to 40mg daily  - q4hr stroke neuro checks and vitals  - MRI Brain without contrast: negative   - orthostatic vitals negative   - Stroke Code HCT Results: Negative  - Stroke Code CTA Results: No LVO or HGS. Moderate calcified plaque at each carotid bifurcation. Scattered sites of mild atherosclerotic stenosis.  - Stroke education    Cards  #HTN  - Goal SBP normotension <160   - nephro consulted for further recs on HTN management  - decreased Atenolol 25 mg PO QD   - started Nifedipine 30 mg XL PO QD   - continue home valsartan 240mg daily   - TTE: negative   - Stroke Code EKG Results: NSR    #HLD  - high dose statin as above in CVA  - LDL results: 167    Pulm  - call provider if SPO2 < 94%    GI  #Nutrition/Fluids/Electrolytes   - replete K<4 and Mg <2  - Diet: DASH/TLC    Renal  - monitor BUN/Cr    Infectious Disease  - Afebrile, no leukocytosis     Endocrine  - A1C results: 5.6    - TSH results: 2.010    ENT   #seasonal allergies  - Continue home artificial tears    DVT Prophylaxis  - Lovenox sq for DVT prophylaxis   - SCDs for DVT prophylaxis       IDR Goals: Goals reviewed at interdisciplinary rounds with case management, social work, physical therapy, occupational therapy, and speech language pathology.   Please see specific therapy  notes for in depth goals.    Dispo: Home PT/OT, pending discharge with BP control      Discussed daily hospital plans and goals with patient and family at bedside.     Discussed with Neurology Attending Dr. Vallejo, Stroke Fellow Dr. Tamayo

## 2023-05-11 NOTE — CONSULT NOTE ADULT - SUBJECTIVE AND OBJECTIVE BOX
HPI:  87y Female with PMHx of HTN, arthritis presented to [facility]  after sudden onset of dizziness Patient was in her usual state of health this morning and had a sudden onset of headache with dizziness and unsteadiness while she was in the bathroom. . NIHSS 0 at [facility].  Stroke work up was negative, however hospital course complicated by  HTN with SBP in the 200's, given IV labetalol and hydralazine. Patient reports that her BP is normally a bit on the higher side but being in the 200's is very abnormal for her. She reports that she is compliant with her BP medication. In the interim patient received labetalol 10mg x2 IVP , Hydralazine 25mg total IVP, Atenolol 25mg which was then increased to 50mg, Valsartan 80mg, then increased 160 and further increased to 240 with blood pressure still not well controlled and ranging from 170-200 and at times being labile with dips to 117 SBP. Patient does not have any history of autonomic dysfunction, no history of DM. Patient states that she has been diagnosed with HTN for over 15 years, and has not recent medication changes or dose changes. She states that she does not eat out as much and uses very low amount of salt in her diet. Patient also states that she does not use NSAIDS for pain. No history of CKD, with normal kidney function. Nephrology consulted for HTN management.     ST MEDICAL & SURGICAL HISTORY:  HTN (hypertension)      No significant past surgical history          FAMILY HISTORY:            T(C): 36.3 (05-06-23 @ 17:31), Max: 36.4 (05-06-23 @ 13:16)  HR: 75 (05-06-23 @ 20:00) (53 - 75)  BP: 189/79 (05-06-23 @ 20:00) (140/66 - 208/89)  RR: 20 (05-06-23 @ 20:00) (16 - 20)  SpO2: 99% (05-06-23 @ 20:00) (95% - 99%)    MEDICATION RECONCILIATION   MEDICATIONS  (STANDING):  aspirin enteric coated 81 milliGRAM(s) Oral daily  atorvastatin 80 milliGRAM(s) Oral at bedtime  clopidogrel Tablet 75 milliGRAM(s) Oral daily  labetalol Injectable 10 milliGRAM(s) IV Push once    MEDICATIONS  (PRN):    Allergies    No Known Allergies    Intolerances      Vital Signs Last 24 Hrs  T(C): 36.3 (06 May 2023 17:31), Max: 36.4 (06 May 2023 13:16)  T(F): 97.3 (06 May 2023 17:31), Max: 97.5 (06 May 2023 13:16)  HR: 75 (06 May 2023 20:00) (53 - 75)  BP: 189/79 (06 May 2023 20:00) (140/66 - 208/89)  BP(mean): 114 (06 May 2023 20:00) (114 - 124)  RR: 20 (06 May 2023 20:00) (16 - 20)  SpO2: 99% (06 May 2023 20:00) (95% - 99%)    Parameters below as of 06 May 2023 20:00  Patient On (Oxygen Delivery Method): room air           (06 May 2023 20:36)      PAST MEDICAL & SURGICAL HISTORY:  HTN (hypertension)      No significant past surgical history            Allergies:  No Known Allergies      Home Medications:   ATENOLOL 50MG TAB:   Multiple Vitamins oral tablet: 1 orally once a day  Refresh Eye Itch Relief 0.025% ophthalmic solution: 1 in each affected eye once a day  VALSARTAN 80MG TAB:       Hospital Medications:   MEDICATIONS  (STANDING):  aspirin enteric coated 81 milliGRAM(s) Oral daily  atenolol  Tablet 25 milliGRAM(s) Oral daily  atorvastatin 80 milliGRAM(s) Oral at bedtime  enoxaparin Injectable 40 milliGRAM(s) SubCutaneous every 24 hours  ketotifen 0.025% Ophthalmic Solution 1 Drop(s) Both EYES daily  multivitamin 1 Tablet(s) Oral daily  NIFEdipine XL 30 milliGRAM(s) Oral daily  valsartan 240 milliGRAM(s) Oral daily      SOCIAL HISTORY:  Denies ETOh, Smoking    Family History:  FAMILY HISTORY:        VITALS:  T(F): 96.9 (05-11-23 @ 10:37), Max: 98.6 (05-10-23 @ 18:15)  HR: 65 (05-11-23 @ 10:45)  BP: 166/74 (05-11-23 @ 10:45)  RR: 26 (05-11-23 @ 10:45)  SpO2: 97% (05-11-23 @ 08:40)  Wt(kg): --    05-11 @ 07:01 - 05-11 @ 13:57  --------------------------------------------------------  IN: 275 mL / OUT: 0 mL / NET: 275 mL        CAPILLARY BLOOD GLUCOSE          Review of Systems:  CONSTITUTIONAL: No fever or chills.  RESPIRATORY: No shortness of breath, cough  CARDIOVASCULAR: No Chest pain, shortness of breath, palpitations  GASTROINTESTINAL: No abdominal pain, nausea, vomiting, diarrhea  GENITOURINARY: No urinary frequency, gross hematuria, dysuria  NEUROLOGICAL: No headache, weakness  SKIN: No rash or skin lesion  MUSCULOSKELETAL: No swelling  Psych: Denies suicidal or homicidal ideation    PHYSICAL EXAM:  GENERAL: Alert, awake, oriented x3   HEENT: JESSICA, EOMI, neck supple, no JVP  CHEST/LUNG: Bilateral clear breath sounds  HEART: Regular rate and rhythm, no murmur, no gallops, no rub   ABDOMEN: Soft, nontender, non distended  : No flank or supra pubic tenderness.  EXTREMITIES: no pedal edema  Neurology: AAOx3, no focal neurological deficit  SKIN: No rash or skin lesion     LABS:        Creatinine Trend: 0.57 <--, 0.58 <--, 0.66 <--    Urine Studies:

## 2023-05-11 NOTE — CONSULT NOTE ADULT - ASSESSMENT
87Y F w/ hx of HTN for 15 years on Atenolol 50mg and Valsartan 80mg over the last 4 years with no recent dose/medication change normal kidney function,  admitted for possible CVA, work up negative, hospital course further c/b elevated BP with no control with dosage increase of home meds and IV pushes, nephrology consulted for HTN management          HTN:  Blood pressure still ranging from 140-180  Asymptomatic, no signs of end organ damage   No signs of volume over load on exam     Recommend:  Add Nifedipine 30mg PO daily   Would decrease dose of Atenolol to 25mg  Continue with Valsartan 240mg   Dash diet

## 2023-05-12 ENCOUNTER — TRANSCRIPTION ENCOUNTER (OUTPATIENT)
Age: 88
End: 2023-05-12

## 2023-05-12 VITALS — TEMPERATURE: 98 F

## 2023-05-12 LAB
ANION GAP SERPL CALC-SCNC: 11 MMOL/L — SIGNIFICANT CHANGE UP (ref 5–17)
BUN SERPL-MCNC: 15 MG/DL — SIGNIFICANT CHANGE UP (ref 7–23)
CALCIUM SERPL-MCNC: 8.7 MG/DL — SIGNIFICANT CHANGE UP (ref 8.4–10.5)
CHLORIDE SERPL-SCNC: 99 MMOL/L — SIGNIFICANT CHANGE UP (ref 96–108)
CO2 SERPL-SCNC: 22 MMOL/L — SIGNIFICANT CHANGE UP (ref 22–31)
CREAT SERPL-MCNC: 0.6 MG/DL — SIGNIFICANT CHANGE UP (ref 0.5–1.3)
EGFR: 87 ML/MIN/1.73M2 — SIGNIFICANT CHANGE UP
GLUCOSE SERPL-MCNC: 104 MG/DL — HIGH (ref 70–99)
HCT VFR BLD CALC: 39.4 % — SIGNIFICANT CHANGE UP (ref 34.5–45)
HGB BLD-MCNC: 12.9 G/DL — SIGNIFICANT CHANGE UP (ref 11.5–15.5)
MAGNESIUM SERPL-MCNC: 1.9 MG/DL — SIGNIFICANT CHANGE UP (ref 1.6–2.6)
MCHC RBC-ENTMCNC: 29.9 PG — SIGNIFICANT CHANGE UP (ref 27–34)
MCHC RBC-ENTMCNC: 32.7 GM/DL — SIGNIFICANT CHANGE UP (ref 32–36)
MCV RBC AUTO: 91.2 FL — SIGNIFICANT CHANGE UP (ref 80–100)
NRBC # BLD: 0 /100 WBCS — SIGNIFICANT CHANGE UP (ref 0–0)
PHOSPHATE SERPL-MCNC: 3.9 MG/DL — SIGNIFICANT CHANGE UP (ref 2.5–4.5)
PLATELET # BLD AUTO: 220 K/UL — SIGNIFICANT CHANGE UP (ref 150–400)
POTASSIUM SERPL-MCNC: 4.1 MMOL/L — SIGNIFICANT CHANGE UP (ref 3.5–5.3)
POTASSIUM SERPL-SCNC: 4.1 MMOL/L — SIGNIFICANT CHANGE UP (ref 3.5–5.3)
RBC # BLD: 4.32 M/UL — SIGNIFICANT CHANGE UP (ref 3.8–5.2)
RBC # FLD: 14.6 % — HIGH (ref 10.3–14.5)
SODIUM SERPL-SCNC: 132 MMOL/L — LOW (ref 135–145)
WBC # BLD: 3.52 K/UL — LOW (ref 3.8–10.5)
WBC # FLD AUTO: 3.52 K/UL — LOW (ref 3.8–10.5)

## 2023-05-12 PROCEDURE — 36415 COLL VENOUS BLD VENIPUNCTURE: CPT

## 2023-05-12 PROCEDURE — 93306 TTE W/DOPPLER COMPLETE: CPT

## 2023-05-12 PROCEDURE — 85730 THROMBOPLASTIN TIME PARTIAL: CPT

## 2023-05-12 PROCEDURE — 85027 COMPLETE CBC AUTOMATED: CPT

## 2023-05-12 PROCEDURE — 84100 ASSAY OF PHOSPHORUS: CPT

## 2023-05-12 PROCEDURE — 97535 SELF CARE MNGMENT TRAINING: CPT

## 2023-05-12 PROCEDURE — 97165 OT EVAL LOW COMPLEX 30 MIN: CPT

## 2023-05-12 PROCEDURE — 96375 TX/PRO/DX INJ NEW DRUG ADDON: CPT

## 2023-05-12 PROCEDURE — 70496 CT ANGIOGRAPHY HEAD: CPT

## 2023-05-12 PROCEDURE — 82962 GLUCOSE BLOOD TEST: CPT

## 2023-05-12 PROCEDURE — 70450 CT HEAD/BRAIN W/O DYE: CPT

## 2023-05-12 PROCEDURE — 99285 EMERGENCY DEPT VISIT HI MDM: CPT | Mod: 25

## 2023-05-12 PROCEDURE — 80048 BASIC METABOLIC PNL TOTAL CA: CPT

## 2023-05-12 PROCEDURE — 84484 ASSAY OF TROPONIN QUANT: CPT

## 2023-05-12 PROCEDURE — 80053 COMPREHEN METABOLIC PANEL: CPT

## 2023-05-12 PROCEDURE — 70498 CT ANGIOGRAPHY NECK: CPT

## 2023-05-12 PROCEDURE — 85610 PROTHROMBIN TIME: CPT

## 2023-05-12 PROCEDURE — 80061 LIPID PANEL: CPT

## 2023-05-12 PROCEDURE — 85025 COMPLETE CBC W/AUTO DIFF WBC: CPT

## 2023-05-12 PROCEDURE — 83036 HEMOGLOBIN GLYCOSYLATED A1C: CPT

## 2023-05-12 PROCEDURE — 96374 THER/PROPH/DIAG INJ IV PUSH: CPT

## 2023-05-12 PROCEDURE — 84443 ASSAY THYROID STIM HORMONE: CPT

## 2023-05-12 PROCEDURE — 70551 MRI BRAIN STEM W/O DYE: CPT

## 2023-05-12 PROCEDURE — 97116 GAIT TRAINING THERAPY: CPT

## 2023-05-12 PROCEDURE — 0042T: CPT

## 2023-05-12 PROCEDURE — 99239 HOSP IP/OBS DSCHRG MGMT >30: CPT

## 2023-05-12 PROCEDURE — 83735 ASSAY OF MAGNESIUM: CPT

## 2023-05-12 PROCEDURE — 97161 PT EVAL LOW COMPLEX 20 MIN: CPT

## 2023-05-12 PROCEDURE — 99232 SBSQ HOSP IP/OBS MODERATE 35: CPT

## 2023-05-12 RX ORDER — ATORVASTATIN CALCIUM 80 MG/1
1 TABLET, FILM COATED ORAL
Qty: 30 | Refills: 1
Start: 2023-05-12 | End: 2023-07-10

## 2023-05-12 RX ORDER — VALSARTAN 80 MG/1
1 TABLET ORAL
Refills: 0 | DISCHARGE

## 2023-05-12 RX ORDER — ASPIRIN/CALCIUM CARB/MAGNESIUM 324 MG
1 TABLET ORAL
Qty: 30 | Refills: 0
Start: 2023-05-12 | End: 2023-06-10

## 2023-05-12 RX ORDER — ATENOLOL 25 MG/1
1 TABLET ORAL
Qty: 30 | Refills: 0
Start: 2023-05-12 | End: 2023-06-10

## 2023-05-12 RX ORDER — VALSARTAN 80 MG/1
3 TABLET ORAL
Qty: 90 | Refills: 0
Start: 2023-05-12 | End: 2023-06-10

## 2023-05-12 RX ORDER — ATENOLOL 25 MG/1
1 TABLET ORAL
Refills: 0 | DISCHARGE

## 2023-05-12 RX ORDER — NIFEDIPINE 30 MG
1 TABLET, EXTENDED RELEASE 24 HR ORAL
Qty: 30 | Refills: 0
Start: 2023-05-12 | End: 2023-06-10

## 2023-05-12 RX ADMIN — Medication 1 TABLET(S): at 11:51

## 2023-05-12 RX ADMIN — VALSARTAN 240 MILLIGRAM(S): 80 TABLET ORAL at 06:02

## 2023-05-12 RX ADMIN — ENOXAPARIN SODIUM 40 MILLIGRAM(S): 100 INJECTION SUBCUTANEOUS at 06:06

## 2023-05-12 RX ADMIN — KETOTIFEN FUMARATE 1 DROP(S): 0.34 SOLUTION OPHTHALMIC at 13:18

## 2023-05-12 RX ADMIN — ATENOLOL 25 MILLIGRAM(S): 25 TABLET ORAL at 06:02

## 2023-05-12 RX ADMIN — Medication 81 MILLIGRAM(S): at 11:50

## 2023-05-12 RX ADMIN — Medication 30 MILLIGRAM(S): at 11:50

## 2023-05-12 NOTE — PROGRESS NOTE ADULT - SUBJECTIVE AND OBJECTIVE BOX
Neurology Stroke Progress Note    INTERVAL HPI/OVERNIGHT EVENTS:  Patient seen and examined. O/N -149. Remains neurologically stable with no focal deficits. Will d/c home today on current BP regimen and have her f/u with her PCP as scheduled for Monday, 5/15.    MEDICATIONS  (STANDING):  aspirin enteric coated 81 milliGRAM(s) Oral daily  atenolol  Tablet 25 milliGRAM(s) Oral daily  atorvastatin 40 milliGRAM(s) Oral at bedtime  enoxaparin Injectable 40 milliGRAM(s) SubCutaneous every 24 hours  ketotifen 0.025% Ophthalmic Solution 1 Drop(s) Both EYES daily  multivitamin 1 Tablet(s) Oral daily  NIFEdipine XL 30 milliGRAM(s) Oral daily  valsartan 240 milliGRAM(s) Oral daily    MEDICATIONS  (PRN):  acetaminophen     Tablet .. 650 milliGRAM(s) Oral every 6 hours PRN Mild Pain (1 - 3)      Allergies    No Known Allergies    Intolerances        Vital Signs Last 24 Hrs  T(C): 36.7 (12 May 2023 13:17), Max: 37.1 (11 May 2023 16:56)  T(F): 98 (12 May 2023 13:17), Max: 98.7 (11 May 2023 16:56)  HR: 59 (12 May 2023 11:56) (59 - 66)  BP: 159/72 (12 May 2023 11:56) (133/61 - 159/72)  BP(mean): 103 (12 May 2023 11:56) (88 - 103)  RR: 19 (12 May 2023 11:56) (18 - 20)  SpO2: 100% (12 May 2023 11:56) (96% - 100%)    Parameters below as of 12 May 2023 11:56  Patient On (Oxygen Delivery Method): room air        Physical exam:  General: No acute distress, awake and alert  Eyes: Anicteric sclerae, moist conjunctivae, see below for CNs  Extremities: No edema    Neurologic:  -Mental status: Awake, alert, oriented to person, place, and time. Speech is fluent with intact naming, repetition, and comprehension, no dysarthria. Follows simple and complex commands. Attention/concentration intact. Fund of knowledge appropriate.  -Cranial nerves:   II: Visual fields are full to confrontation.  III, IV, VI: Extraocular movements are intact without nystagmus. Pupils equally round and reactive to light  V:  Facial sensation V1-V3 equal and intact   VII: Face is symmetric with normal eye closure and smile  IX, X: Uvula is midline a  XI: Head turning and shoulder shrug are intact.  XII: Tongue protrudes midline  Motor: Normal bulk and tone. No pronator drift. Strength bilateral upper extremity 5/5, bilateral lower extremities 5/5.  Sensation: Intact to light touch bilaterally. No neglect or extinction on double simultaneous testing.  Coordination: No dysmetria on finger-to-nose   Gait: Deferred.    LABS:                        12.9   3.52  )-----------( 220      ( 12 May 2023 07:19 )             39.4     05-12    132<L>  |  99  |  15  ----------------------------<  104<H>  4.1   |  22  |  0.60    Ca    8.7      12 May 2023 07:19  Phos  3.9     05-12  Mg     1.9     05-12            RADIOLOGY & ADDITIONAL TESTS: Reviewed.

## 2023-05-12 NOTE — DISCHARGE NOTE NURSING/CASE MANAGEMENT/SOCIAL WORK - PATIENT PORTAL LINK FT
You can access the FollowMyHealth Patient Portal offered by [facility] by registering at the following website: [email or web address]. By joining [employer]’s FollowMyHealth portal, you will also be able to view your health information using other applications (apps) compatible with our system.

## 2023-05-12 NOTE — DISCHARGE NOTE NURSING/CASE MANAGEMENT/SOCIAL WORK - NSDCVIVACCINE_GEN_ALL_CORE_FT
Tdap; 19-Feb-2019 13:55; Madie Del Cid (RADHA); [employer]; [id number] (Exp. Date: 11-Feb-2020); IntraMuscular; Deltoid Right.; 0.5 milliLiter(s); VIS (VIS Published: 09-May-2013, VIS Presented: 19-Feb-2019);

## 2023-05-12 NOTE — PROGRESS NOTE ADULT - SUBJECTIVE AND OBJECTIVE BOX
Patient is a 87y old  Female who presents with a chief complaint of dizziness (12 May 2023 14:52)      INTERVAL HPI/OVERNIGHT EVENTS:    Pt. seen and examined earlier today  Pt. feels well, has no complaints  Pt. denies F/C, CP, SOB, HA, N/V, blurry vision    Review of Systems: 12 point review of systems otherwise negative    MEDICATIONS  (STANDING):  aspirin enteric coated 81 milliGRAM(s) Oral daily  atenolol  Tablet 25 milliGRAM(s) Oral daily  atorvastatin 40 milliGRAM(s) Oral at bedtime  enoxaparin Injectable 40 milliGRAM(s) SubCutaneous every 24 hours  ketotifen 0.025% Ophthalmic Solution 1 Drop(s) Both EYES daily  multivitamin 1 Tablet(s) Oral daily  NIFEdipine XL 30 milliGRAM(s) Oral daily  valsartan 240 milliGRAM(s) Oral daily    MEDICATIONS  (PRN):  acetaminophen     Tablet .. 650 milliGRAM(s) Oral every 6 hours PRN Mild Pain (1 - 3)      Allergies    No Known Allergies    Intolerances          Vital Signs Last 24 Hrs  T(C): 36.7 (12 May 2023 13:17), Max: 37.1 (11 May 2023 16:56)  T(F): 98 (12 May 2023 13:17), Max: 98.7 (11 May 2023 16:56)  HR: 59 (12 May 2023 11:56) (59 - 66)  BP: 159/72 (12 May 2023 11:56) (133/61 - 159/72)  BP(mean): 103 (12 May 2023 11:56) (88 - 103)  RR: 19 (12 May 2023 11:56) (18 - 20)  SpO2: 100% (12 May 2023 11:56) (96% - 100%)    Parameters below as of 12 May 2023 11:56  Patient On (Oxygen Delivery Method): room air      CAPILLARY BLOOD GLUCOSE          05-11 @ 07:01  -  05-12 @ 07:00  --------------------------------------------------------  IN: 515 mL / OUT: 0 mL / NET: 515 mL    05-12 @ 07:01  -  05-12 @ 16:37  --------------------------------------------------------  IN: 650 mL / OUT: 0 mL / NET: 650 mL        Physical Exam:  (earlier today)  Daily     Daily   General:  well-appearing in NAD, sitting in a chair  HEENT:  RA  Neuro:  AAOx3, non-focal      LABS:                        12.9   3.52  )-----------( 220      ( 12 May 2023 07:19 )             39.4     05-12    132<L>  |  99  |  15  ----------------------------<  104<H>  4.1   |  22  |  0.60    Ca    8.7      12 May 2023 07:19  Phos  3.9     05-12  Mg     1.9     05-12

## 2023-05-12 NOTE — DISCHARGE NOTE NURSING/CASE MANAGEMENT/SOCIAL WORK - NSDCFUADDAPPT_GEN_ALL_CORE_FT
You will receive a call from the neurology office to set up an outpatient follow up appointment. If you do not receive a call within one week of discharge, please call [phone] and ask to make an appointment with a provider from the stroke team.    Follow up with your PCP within 2 weeks of discharge.

## 2023-05-12 NOTE — PROGRESS NOTE ADULT - PROBLEM SELECTOR PLAN 2
given age and no h/o DM2 or CKD, BP goal is gradually to less than 150/90; cont. Atenolol, Diovan, DASH diet; can increase Diovan dosing as needed; Pt. will need close PCP f/u w/ Dr. Guthrie
BP better controlled; cont. Atenolol, Diovan, DASH diet; can increase Diovan dosing as needed; Pt. will need close PCP f/u w/ Dr. Guthrie
now asymptomatic; given age and no h/o DM2 or CKD, BP goal is gradually to less than 150/90; cont. regimen per Renal recs; ensure proper cuff size; Pt. has close PCP f/u -- per Pt., PCP will be referring her to a HTN specialist
now asymptomatic; no e/o end-organ damage; given age and no h/o DM2 or CKD, BP goal is gradually to less than 150/90; cont. regimen per Renal recs; ensure proper cuff size; Pt. has close PCP f/u -- per Pt., PCP will be referring her to a HTN specialist
cont. Atenolol, Diovan, DASH diet; can increase Diovan dosing as needed

## 2023-05-12 NOTE — DISCHARGE NOTE NURSING/CASE MANAGEMENT/SOCIAL WORK - NSDCPEFALRISK_GEN_ALL_CORE
For information on Fall & Injury Prevention, visit: [email or web address] OR  [email or web address] OR  https://www.cdc.gov/steadi/patient.html

## 2023-05-12 NOTE — PROGRESS NOTE ADULT - NS ATTEND AMEND GEN_ALL_CORE FT
The patient is an 87-year-old female with a history of hypertension admitted 5/6 with sudden onset headache, dizziness, and unsteady gait in s/o SBP >200. MRI negative. TTE unremarkable. Suspect hypertensive emergency. Continue valsartan 160 mg, atenolol 50 mg daily; will consider adding nimodipine and consulting neph. Continue aspirin, statin for now.  SBP goal: eventual normotension.
The patient is an 87-year-old female with a history of hypertension admitted 5/6 with sudden onset headache, dizziness, and unsteady gait in s/o SBP >200. MRI negative. Suspect hypertensive emergency. BP goal: normotension. Continue valsartan 240 mg, atenolol 25 mg daily; nifedipine 30 mg daily per neph recs.  Continue aspirin, statin for now.
The patient is an 87-year-old female with a history of hypertension admitted 5/6 with sudden onset headache, dizziness, and unsteady gait.  CT head and CTA were unremarkable but tPA did show right cerebellar and bilateral posterior cerebral Tmax elevation, possibly artifactual.  SBP was in the 200s.  MRI negative. TTE pending. Suspect hypertensive emergency.  Continue aspirin, statin for now. SBP: gradual normotension.
The patient is an 87-year-old female with a history of hypertension admitted 5/6 with sudden onset headache, dizziness, and unsteady gait in s/o SBP >200. MRI negative. Suspect hypertensive emergency. BP goal: normotension. Continue valsartan 240 mg, atenolol 25 mg daily; nifedipine 30 mg daily per neph recs.  Continue aspirin, statin for now.
The patient is an 87-year-old female with a history of hypertension admitted 5/6 with sudden onset headache, dizziness, and unsteady gait in s/o SBP >200. MRI negative. TTE unremarkable. Suspect hypertensive emergency. Will continue adjust BP meds PRN. Continue aspirin, statin for now.

## 2023-05-12 NOTE — PROGRESS NOTE ADULT - ASSESSMENT
87Y F w/ hx of HTN for 15 years with normal kidney function, initially consulted for uncontrolled BP, added  Nifedipine 30mg and decreased dose fo Atenolol to 25mg, on top of Valsartan 240mg, now with well controlled BP         HTN:  Blood pressure still ranging from 140 to150  Asymptomatic, no signs of end organ damage   No signs of volume over load on exam     Recommend:  c/w Nifedipine 30mg PO daily   C/w Atenolol to 25mg  C/w  Valsartan 240mg   Dash diet   Given age of 87 goal SBP <150 is sufficient

## 2023-05-12 NOTE — PROGRESS NOTE ADULT - PROVIDER SPECIALTY LIST ADULT
Neurology
Nephrology
Neurology
Internal Medicine

## 2023-05-12 NOTE — PROGRESS NOTE ADULT - PROBLEM SELECTOR PLAN 1
a/w HA and unsteady gait; sx initially concerning for CVA; however, no e/o acute ischemia on MRI; sx possibly due to elevated BP, vs. TIA or vertigo; cont. work-up and mgmt per Neuro; PT/OT; on ASA and statin

## 2023-05-12 NOTE — PROGRESS NOTE ADULT - ATTENDING COMMENTS
BP trend reviewed this AM.  Case d/w renal fellow at length.  Agree with above recs.   Pt will require close f/u as outpatient.

## 2023-05-12 NOTE — PROGRESS NOTE ADULT - TIME BILLING
laceration to left great toe cut with glass approx 30 min
chart review  d/w Stroke ACP
Review of chart information, interpretation of data, evaluation of patient, coordination of care.
chart review  d/w Stroke ACP

## 2023-05-12 NOTE — PROGRESS NOTE ADULT - ASSESSMENT
87y Female with PMHx of HTN, arthritis transferred from [facility] to [facility] today after sudden onset of headache, dizziness, and unsteady gait that began this morning at 0930. NIHSS 0 on arrival to [facility]. CTH negative for any acute intracranial pathology. CTA negative for high grade stenosis or LVO. CTP with right cerebellar and bilateral posterior cerebral Tmax elevation, but delayed bolus curves. Not a candidate for any intervention. Hypertensive to the 200's treated with IV pushes. Transferred to [facility] for further workup and stroke rule out, mild right upper extremity pronation noted on exam, resolved this morning. MRI brain negative for stroke. Course c/b hypertensive episodes up to 200s, pending blood pressure management. Episode likely due to hypertensive emergency. Remains neurologically intact, will d/c home today on BP regimen per nephro. See discharge note for further details.

## 2023-05-12 NOTE — PROGRESS NOTE ADULT - NSPROGADDITIONALINFOA_GEN_ALL_CORE
DVT ppx: LMWH  Dispo: home w/ home PT/OT  PCP: Dr. Guthrie
DVT ppx: LMWH  Dispo: home w/ home PT/OT
DVT ppx: LMWH  Dispo: home w/ home PT/OT  PCP: Dr. Guthrie

## 2023-05-16 DIAGNOSIS — R26.81 UNSTEADINESS ON FEET: ICD-10-CM

## 2023-05-16 DIAGNOSIS — R42 DIZZINESS AND GIDDINESS: ICD-10-CM

## 2023-05-16 DIAGNOSIS — I16.0 HYPERTENSIVE URGENCY: ICD-10-CM

## 2023-05-16 DIAGNOSIS — I10 ESSENTIAL (PRIMARY) HYPERTENSION: ICD-10-CM

## 2023-06-02 NOTE — DISCHARGE NOTE NURSING/CASE MANAGEMENT/SOCIAL WORK - NSSCNAMETXT_GEN_ALL_CORE
Detail Level: Detailed
[facility]
Patient Specific Counseling (Will Not Stick From Patient To Patient): -patient to rtc if anything changes or becomes concerning. \\n-patient to stop using Neosporin and only use vaseline
Detail Level: Zone
Detail Level: Generalized
Sunscreen Recommendations: spf >30 - preferably zinc oxide
Skin Checks Recommendations: yearly skin exams

## 2023-10-12 NOTE — ED ADULT TRIAGE NOTE - BP NONINVASIVE SYSTOLIC (MM HG)
185 Debridement Text: The wound edges were debrided prior to proceeding with the closure to facilitate wound healing.

## 2023-10-19 ENCOUNTER — APPOINTMENT (OUTPATIENT)
Dept: OPHTHALMOLOGY | Facility: CLINIC | Age: 88
End: 2023-10-19
Payer: MEDICARE

## 2023-10-19 ENCOUNTER — NON-APPOINTMENT (OUTPATIENT)
Age: 88
End: 2023-10-19

## 2023-10-19 PROCEDURE — 92083 EXTENDED VISUAL FIELD XM: CPT

## 2023-10-19 PROCEDURE — 92012 INTRM OPH EXAM EST PATIENT: CPT

## 2024-03-20 NOTE — ED ADULT NURSE NOTE - NS ED NOTE ABUSE SUSPICION NEGLECT YN
Reason for Exam: Hx of breast cancer, conservation therapy. 

Last screening mammogram was performed 7 month(s) ago.





Patient History: 

Menarche at age 12. Patient has no children. Postmenopausal. Breast cancer, right, age 64. Breast

cancer, left, age 68. Breast cancer, left, age 68. Previous chest radiation therapy at age 64.

Hormonal Contraceptives for 11 years from age 19 until age 30. 12/05/2023, Lumpectomy on the Left

side. 12/05/2023, Malignant MG pre op needle loc LT on the left side. 10/11/2023, Malignant US

biopsy breast VAD LT on the left side. 09/19/2023, Benign MG pre op needle loc RT on the right side.

09/06/2023, US biopsy breast VAD RT on the Right side. Excisional Biopsy on the Right side. 12/2020,

Reduction on the Left side. 01/15/2020, Lumpectomy on the Right side. 1/15/2020, Malignant Core

Biopsy on the right side. 1/15/2020, Malignant Core Biopsy on the left side. 1/30/2001, Benign

Excisional Biopsy on the left side. 2020, Radiation Therapy on the right side. 





Prior Study Comparison: 

9/6/2023 Right MG diagnostic mammo RT wo CAD, [facility]. 9/29/2023 Left MG 3D diag mammo w/cad LT, [facility].

10/11/2023 Left MG diagnostic mammo LT wo CAD., [facility]. 





Tissue Density: 

Right: There are scattered areas of fibroglandular density.





Findings: 

Analyzed By CAD. 

Right breast biopsy clip.

Post surgical changes right breast with surgical clips in place. Multiple benign appearing

calcifications are present.



No new suspicious masses, calcifications or distortions. 





Overall Assessment: Benign, BI-RAD 2





Management: 

Screening Mammogram of both breasts in 1 year.

Results were given to the patient verbally at the time of exam.



Patient should continue monthly self-breast exams.  A clinical breast exam by your physician is

recommended on an annual basis.

This exam should not preclude additional follow-up of suspicious palpable abnormalities.





Note on Linda scores and lifetime risk:

1. A Linda score greater than 3% is considered moderate risk. If this is the case, consider

specialist referral to assess eligibility for a risk reducing agent.

2. If overall lifetime risk for the development of breast cancer is 20% or higher, the patient may

qualify for future screening with alternating mammogram and breast MRI.



Electronically signed and approved by: oLw Cheng DO No

## 2024-03-21 ENCOUNTER — NON-APPOINTMENT (OUTPATIENT)
Age: 89
End: 2024-03-21

## 2024-03-21 ENCOUNTER — APPOINTMENT (OUTPATIENT)
Dept: OPHTHALMOLOGY | Facility: CLINIC | Age: 89
End: 2024-03-21
Payer: MEDICARE

## 2024-03-21 PROCEDURE — 92083 EXTENDED VISUAL FIELD XM: CPT

## 2024-03-21 PROCEDURE — 92020 GONIOSCOPY: CPT

## 2024-03-21 PROCEDURE — 92250 FUNDUS PHOTOGRAPHY W/I&R: CPT

## 2024-03-21 PROCEDURE — 92014 COMPRE OPH EXAM EST PT 1/>: CPT

## 2024-08-16 NOTE — PHYSICAL EXAM
[Respiratory Effort] : normal respiratory effort [Normal Heart Sounds] : normal heart sounds [2+] : left 2+ [Abdomen Tenderness] : ~T ~M No abdominal tenderness [Alert] : alert [Calm] : calm [de-identified] : WN/WD, NAD [de-identified] : NC/AT [de-identified] : supple [de-identified] : FROM

## 2024-08-16 NOTE — HISTORY OF PRESENT ILLNESS
[FreeTextEntry1] : 89yoF with PMHx of HTN, arthritis who is referred by Dr. Tiomthy Guthrie to be evaluated for lower extremity edema.

## 2024-08-16 NOTE — ADDENDUM
[FreeTextEntry1] : This note was written by Dominga ROSARIO, acting as a scribe for Dr. Alessandro Nogueira.  I, Dr. Alessandro Nogueira, have read and attest that all the information, medical decision-making, and discharge instructions within are true and accurate.   I, Dr. Alessandro Nogueira, personally performed the evaluation and management (E/M) services for this new patient.  That E/M includes conducting the initial examination, assessing all conditions, and establishing the plan of care.  Today, my ACP, Dominga ROSARIO, was here to observe my evaluation and management services for this patient to be followed going forward.

## 2024-08-16 NOTE — ASSESSMENT
[FreeTextEntry1] : 89yoF with PMHx of HTN, arthritis who is referred by Dr. Guthrie to be evaluated for lower extremity edema.

## 2024-08-22 ENCOUNTER — APPOINTMENT (OUTPATIENT)
Dept: VASCULAR SURGERY | Facility: CLINIC | Age: 89
End: 2024-08-22

## 2024-08-26 ENCOUNTER — NON-APPOINTMENT (OUTPATIENT)
Age: 89
End: 2024-08-26

## 2024-08-26 ENCOUNTER — APPOINTMENT (OUTPATIENT)
Dept: OPHTHALMOLOGY | Facility: CLINIC | Age: 89
End: 2024-08-26
Payer: MEDICARE

## 2024-08-26 PROCEDURE — 92083 EXTENDED VISUAL FIELD XM: CPT

## 2024-08-26 PROCEDURE — 92012 INTRM OPH EXAM EST PATIENT: CPT

## 2024-08-26 PROCEDURE — 92133 CPTRZD OPH DX IMG PST SGM ON: CPT

## 2024-12-01 ENCOUNTER — INPATIENT (INPATIENT)
Facility: HOSPITAL | Age: 88
LOS: 2 days | Discharge: HOME HEALTH | DRG: 641 | End: 2024-12-04
Attending: STUDENT IN AN ORGANIZED HEALTH CARE EDUCATION/TRAINING PROGRAM | Admitting: INTERNAL MEDICINE
Payer: MEDICARE

## 2024-12-01 VITALS
OXYGEN SATURATION: 99 % | HEART RATE: 59 BPM | TEMPERATURE: 98 F | RESPIRATION RATE: 17 BRPM | SYSTOLIC BLOOD PRESSURE: 154 MMHG | DIASTOLIC BLOOD PRESSURE: 64 MMHG | HEIGHT: 63 IN | WEIGHT: 110.01 LBS

## 2024-12-01 LAB
ALBUMIN SERPL ELPH-MCNC: 4.1 G/DL — SIGNIFICANT CHANGE UP (ref 3.4–5)
ALP SERPL-CCNC: 95 U/L — SIGNIFICANT CHANGE UP (ref 40–120)
ALT FLD-CCNC: 48 U/L — HIGH (ref 12–42)
ANION GAP SERPL CALC-SCNC: 5 MMOL/L — LOW (ref 9–16)
ANION GAP SERPL CALC-SCNC: 5 MMOL/L — LOW (ref 9–16)
AST SERPL-CCNC: 52 U/L — HIGH (ref 15–37)
BASOPHILS # BLD AUTO: 0.02 K/UL — SIGNIFICANT CHANGE UP (ref 0–0.2)
BASOPHILS NFR BLD AUTO: 0.6 % — SIGNIFICANT CHANGE UP (ref 0–2)
BILIRUB SERPL-MCNC: 0.4 MG/DL — SIGNIFICANT CHANGE UP (ref 0.2–1.2)
BUN SERPL-MCNC: 17 MG/DL — SIGNIFICANT CHANGE UP (ref 7–23)
BUN SERPL-MCNC: 19 MG/DL — SIGNIFICANT CHANGE UP (ref 7–23)
CALCIUM SERPL-MCNC: 9 MG/DL — SIGNIFICANT CHANGE UP (ref 8.5–10.5)
CALCIUM SERPL-MCNC: 9.8 MG/DL — SIGNIFICANT CHANGE UP (ref 8.5–10.5)
CHLORIDE SERPL-SCNC: 95 MMOL/L — LOW (ref 96–108)
CHLORIDE SERPL-SCNC: 96 MMOL/L — SIGNIFICANT CHANGE UP (ref 96–108)
CO2 SERPL-SCNC: 25 MMOL/L — SIGNIFICANT CHANGE UP (ref 22–31)
CO2 SERPL-SCNC: 26 MMOL/L — SIGNIFICANT CHANGE UP (ref 22–31)
CREAT SERPL-MCNC: 0.77 MG/DL — SIGNIFICANT CHANGE UP (ref 0.5–1.3)
CREAT SERPL-MCNC: 0.81 MG/DL — SIGNIFICANT CHANGE UP (ref 0.5–1.3)
EGFR: 69 ML/MIN/1.73M2 — SIGNIFICANT CHANGE UP
EGFR: 74 ML/MIN/1.73M2 — SIGNIFICANT CHANGE UP
EOSINOPHIL # BLD AUTO: 0.08 K/UL — SIGNIFICANT CHANGE UP (ref 0–0.5)
EOSINOPHIL NFR BLD AUTO: 2.5 % — SIGNIFICANT CHANGE UP (ref 0–6)
GLUCOSE SERPL-MCNC: 81 MG/DL — SIGNIFICANT CHANGE UP (ref 70–99)
GLUCOSE SERPL-MCNC: 88 MG/DL — SIGNIFICANT CHANGE UP (ref 70–99)
HCT VFR BLD CALC: 39.5 % — SIGNIFICANT CHANGE UP (ref 34.5–45)
HGB BLD-MCNC: 13.5 G/DL — SIGNIFICANT CHANGE UP (ref 11.5–15.5)
IMM GRANULOCYTES NFR BLD AUTO: 1 % — HIGH (ref 0–0.9)
LYMPHOCYTES # BLD AUTO: 0.89 K/UL — LOW (ref 1–3.3)
LYMPHOCYTES # BLD AUTO: 28.3 % — SIGNIFICANT CHANGE UP (ref 13–44)
MCHC RBC-ENTMCNC: 30.1 PG — SIGNIFICANT CHANGE UP (ref 27–34)
MCHC RBC-ENTMCNC: 34.2 G/DL — SIGNIFICANT CHANGE UP (ref 32–36)
MCV RBC AUTO: 88.2 FL — SIGNIFICANT CHANGE UP (ref 80–100)
MONOCYTES # BLD AUTO: 0.33 K/UL — SIGNIFICANT CHANGE UP (ref 0–0.9)
MONOCYTES NFR BLD AUTO: 10.5 % — SIGNIFICANT CHANGE UP (ref 2–14)
NEUTROPHILS # BLD AUTO: 1.79 K/UL — LOW (ref 1.8–7.4)
NEUTROPHILS NFR BLD AUTO: 57.1 % — SIGNIFICANT CHANGE UP (ref 43–77)
NRBC # BLD: 0 /100 WBCS — SIGNIFICANT CHANGE UP (ref 0–0)
PLATELET # BLD AUTO: 210 K/UL — SIGNIFICANT CHANGE UP (ref 150–400)
POTASSIUM SERPL-MCNC: 4.6 MMOL/L — SIGNIFICANT CHANGE UP (ref 3.5–5.3)
POTASSIUM SERPL-MCNC: 5.7 MMOL/L — HIGH (ref 3.5–5.3)
POTASSIUM SERPL-SCNC: 4.6 MMOL/L — SIGNIFICANT CHANGE UP (ref 3.5–5.3)
POTASSIUM SERPL-SCNC: 5.7 MMOL/L — HIGH (ref 3.5–5.3)
PROT SERPL-MCNC: 7.6 G/DL — SIGNIFICANT CHANGE UP (ref 6.4–8.2)
RBC # BLD: 4.48 M/UL — SIGNIFICANT CHANGE UP (ref 3.8–5.2)
RBC # FLD: 14.5 % — SIGNIFICANT CHANGE UP (ref 10.3–14.5)
SODIUM SERPL-SCNC: 125 MMOL/L — LOW (ref 132–145)
SODIUM SERPL-SCNC: 127 MMOL/L — LOW (ref 132–145)
WBC # BLD: 3.14 K/UL — LOW (ref 3.8–10.5)
WBC # FLD AUTO: 3.14 K/UL — LOW (ref 3.8–10.5)

## 2024-12-01 PROCEDURE — 99223 1ST HOSP IP/OBS HIGH 75: CPT

## 2024-12-01 PROCEDURE — 70450 CT HEAD/BRAIN W/O DYE: CPT | Mod: 26,MC

## 2024-12-01 RX ORDER — SODIUM CHLORIDE 9 MG/ML
500 INJECTION, SOLUTION INTRAMUSCULAR; INTRAVENOUS; SUBCUTANEOUS ONCE
Refills: 0 | Status: COMPLETED | OUTPATIENT
Start: 2024-12-01 | End: 2024-12-01

## 2024-12-01 RX ORDER — ACETAMINOPHEN 500MG 500 MG/1
650 TABLET, COATED ORAL ONCE
Refills: 0 | Status: COMPLETED | OUTPATIENT
Start: 2024-12-01 | End: 2024-12-01

## 2024-12-01 RX ADMIN — ACETAMINOPHEN 500MG 650 MILLIGRAM(S): 500 TABLET, COATED ORAL at 18:55

## 2024-12-01 RX ADMIN — SODIUM CHLORIDE 500 MILLILITER(S): 9 INJECTION, SOLUTION INTRAMUSCULAR; INTRAVENOUS; SUBCUTANEOUS at 19:58

## 2024-12-01 NOTE — ED PROVIDER NOTE - OBJECTIVE STATEMENT
Patient reports that she has had headache since waking up this morning, 6/10, diffuse, dull. Took tylenol and ibuprofen without relief. No fever, neck stiffness, cp, sob, ap, n/v/d, focal weakness, paresthesias. Denies history of similar headaches. Takes ASA 81mg per day and high blood pressure medication.

## 2024-12-01 NOTE — ED CDU PROVIDER INITIAL DAY NOTE - PROGRESS NOTE DETAILS
Patient signed out to YORDAN Vincent. Plan is to recheck BMP after IV fluids. If improved, can d/c to f/u with PMD tomorrow. If no improvement or worsens, will admit.

## 2024-12-01 NOTE — ED ADULT NURSE NOTE - OBJECTIVE STATEMENT
Pt is A&OX4 ambulatory-family present at bedside. Pt c/o headache since AM. Pt has been compliant with BP medications. Pt denies dizziness, photosensitivity, chest pain, palpitations, SOB, abdominal pain, N/V at this time. IV access obtained L arm 22G-labs drawn and sent. Medication administered as per orders. Pt is A&OX4 ambulatory with cane-family present at bedside. Pt c/o headache since AM. Pt has been compliant with BP medications. Pt denies dizziness, photosensitivity, chest pain, palpitations, SOB, abdominal pain, N/V at this time. IV access obtained L arm 22G-labs drawn and sent. Medication administered as per orders.

## 2024-12-01 NOTE — ED CDU PROVIDER DISPOSITION NOTE - CLINICAL COURSE
hyponatremia, initial Na 125, repeat 127 after 500cc NS. previous labs show no history of hyponatremia. potassium normalized. no EKG changes. patient still continues to feel symptomatic with headache and lightheadedness. patient awake, alert, not altered. no focal neuro deficits.. discussed with hospitalist attending Dr. Ingram who accepts patient for admission. patient agrees with plan

## 2024-12-01 NOTE — ED PROVIDER NOTE - PHYSICAL EXAMINATION
Gen: NAD. HEENT: NCAT, mmm   Chest: RRR, nl S1 and S2, no m/r/g. Resp: CTAB, no w/r/r  Abd: nl BS, soft, nt/nd. Ext: Warm, dry  Neuro: CN II-XII intact, normal and equal strength, sensation, and reflexes bilaterally, speech clear  Psych: AAOx3

## 2024-12-01 NOTE — ED PROVIDER NOTE - PROGRESS NOTE DETAILS
Patient mildly hypernatremic and hyperkalemic. Will get EKG, give IVF, reassess. Patient is resting comfortably, NAD. CT head shows some sinus disease. Will place on observation to recheck labs after IVF.

## 2024-12-02 DIAGNOSIS — R74.01 ELEVATION OF LEVELS OF LIVER TRANSAMINASE LEVELS: ICD-10-CM

## 2024-12-02 DIAGNOSIS — R51.9 HEADACHE, UNSPECIFIED: ICD-10-CM

## 2024-12-02 DIAGNOSIS — I10 ESSENTIAL (PRIMARY) HYPERTENSION: ICD-10-CM

## 2024-12-02 DIAGNOSIS — E87.5 HYPERKALEMIA: ICD-10-CM

## 2024-12-02 DIAGNOSIS — E87.1 HYPO-OSMOLALITY AND HYPONATREMIA: ICD-10-CM

## 2024-12-02 DIAGNOSIS — E78.5 HYPERLIPIDEMIA, UNSPECIFIED: ICD-10-CM

## 2024-12-02 DIAGNOSIS — Z29.9 ENCOUNTER FOR PROPHYLACTIC MEASURES, UNSPECIFIED: ICD-10-CM

## 2024-12-02 LAB
ALBUMIN SERPL ELPH-MCNC: 3.8 G/DL — SIGNIFICANT CHANGE UP (ref 3.3–5)
ALP SERPL-CCNC: 79 U/L — SIGNIFICANT CHANGE UP (ref 40–120)
ALT FLD-CCNC: 28 U/L — SIGNIFICANT CHANGE UP (ref 10–45)
ANION GAP SERPL CALC-SCNC: 10 MMOL/L — SIGNIFICANT CHANGE UP (ref 5–17)
ANION GAP SERPL CALC-SCNC: 12 MMOL/L — SIGNIFICANT CHANGE UP (ref 5–17)
APPEARANCE UR: ABNORMAL
APPEARANCE UR: CLEAR — SIGNIFICANT CHANGE UP
APPEARANCE UR: CLEAR — SIGNIFICANT CHANGE UP
AST SERPL-CCNC: 28 U/L — SIGNIFICANT CHANGE UP (ref 10–40)
BASOPHILS # BLD AUTO: 0.01 K/UL — SIGNIFICANT CHANGE UP (ref 0–0.2)
BASOPHILS NFR BLD AUTO: 0.3 % — SIGNIFICANT CHANGE UP (ref 0–2)
BILIRUB SERPL-MCNC: 0.2 MG/DL — SIGNIFICANT CHANGE UP (ref 0.2–1.2)
BILIRUB UR-MCNC: NEGATIVE — SIGNIFICANT CHANGE UP
BLD GP AB SCN SERPL QL: NEGATIVE — SIGNIFICANT CHANGE UP
BUN SERPL-MCNC: 12 MG/DL — SIGNIFICANT CHANGE UP (ref 7–23)
BUN SERPL-MCNC: 12 MG/DL — SIGNIFICANT CHANGE UP (ref 7–23)
CALCIUM SERPL-MCNC: 8.9 MG/DL — SIGNIFICANT CHANGE UP (ref 8.4–10.5)
CALCIUM SERPL-MCNC: 9.2 MG/DL — SIGNIFICANT CHANGE UP (ref 8.4–10.5)
CHLORIDE SERPL-SCNC: 94 MMOL/L — LOW (ref 96–108)
CHLORIDE SERPL-SCNC: 96 MMOL/L — SIGNIFICANT CHANGE UP (ref 96–108)
CO2 SERPL-SCNC: 20 MMOL/L — LOW (ref 22–31)
CO2 SERPL-SCNC: 23 MMOL/L — SIGNIFICANT CHANGE UP (ref 22–31)
COLOR SPEC: YELLOW — SIGNIFICANT CHANGE UP
CREAT ?TM UR-MCNC: 17 MG/DL — SIGNIFICANT CHANGE UP
CREAT ?TM UR-MCNC: 29 MG/DL — SIGNIFICANT CHANGE UP
CREAT SERPL-MCNC: 0.52 MG/DL — SIGNIFICANT CHANGE UP (ref 0.5–1.3)
CREAT SERPL-MCNC: 0.63 MG/DL — SIGNIFICANT CHANGE UP (ref 0.5–1.3)
DIFF PNL FLD: ABNORMAL
DIFF PNL FLD: ABNORMAL
DIFF PNL FLD: NEGATIVE — SIGNIFICANT CHANGE UP
EGFR: 85 ML/MIN/1.73M2 — SIGNIFICANT CHANGE UP
EGFR: 89 ML/MIN/1.73M2 — SIGNIFICANT CHANGE UP
EOSINOPHIL # BLD AUTO: 0.11 K/UL — SIGNIFICANT CHANGE UP (ref 0–0.5)
EOSINOPHIL NFR BLD AUTO: 3.5 % — SIGNIFICANT CHANGE UP (ref 0–6)
GLUCOSE SERPL-MCNC: 153 MG/DL — HIGH (ref 70–99)
GLUCOSE SERPL-MCNC: 77 MG/DL — SIGNIFICANT CHANGE UP (ref 70–99)
GLUCOSE UR QL: NEGATIVE MG/DL — SIGNIFICANT CHANGE UP
HCT VFR BLD CALC: 33.8 % — LOW (ref 34.5–45)
HGB BLD-MCNC: 11.3 G/DL — LOW (ref 11.5–15.5)
IMM GRANULOCYTES NFR BLD AUTO: 0.9 % — SIGNIFICANT CHANGE UP (ref 0–0.9)
KETONES UR-MCNC: NEGATIVE MG/DL — SIGNIFICANT CHANGE UP
LEUKOCYTE ESTERASE UR-ACNC: ABNORMAL
LYMPHOCYTES # BLD AUTO: 0.77 K/UL — LOW (ref 1–3.3)
LYMPHOCYTES # BLD AUTO: 24.4 % — SIGNIFICANT CHANGE UP (ref 13–44)
MAGNESIUM SERPL-MCNC: 1.9 MG/DL — SIGNIFICANT CHANGE UP (ref 1.6–2.6)
MCHC RBC-ENTMCNC: 29 PG — SIGNIFICANT CHANGE UP (ref 27–34)
MCHC RBC-ENTMCNC: 33.4 G/DL — SIGNIFICANT CHANGE UP (ref 32–36)
MCV RBC AUTO: 86.9 FL — SIGNIFICANT CHANGE UP (ref 80–100)
MONOCYTES # BLD AUTO: 0.51 K/UL — SIGNIFICANT CHANGE UP (ref 0–0.9)
MONOCYTES NFR BLD AUTO: 16.1 % — HIGH (ref 2–14)
NEUTROPHILS # BLD AUTO: 1.73 K/UL — LOW (ref 1.8–7.4)
NEUTROPHILS NFR BLD AUTO: 54.8 % — SIGNIFICANT CHANGE UP (ref 43–77)
NITRITE UR-MCNC: NEGATIVE — SIGNIFICANT CHANGE UP
NRBC # BLD: 0 /100 WBCS — SIGNIFICANT CHANGE UP (ref 0–0)
OSMOLALITY SERPL: 269 MOSM/KG — LOW (ref 280–301)
OSMOLALITY UR: 222 MOSM/KG — LOW (ref 300–900)
OSMOLALITY UR: 254 MOSM/KG — LOW (ref 300–900)
PH UR: 6.5 — SIGNIFICANT CHANGE UP (ref 5–8)
PH UR: 6.5 — SIGNIFICANT CHANGE UP (ref 5–8)
PH UR: 7 — SIGNIFICANT CHANGE UP (ref 5–8)
PHOSPHATE SERPL-MCNC: 3.3 MG/DL — SIGNIFICANT CHANGE UP (ref 2.5–4.5)
PLATELET # BLD AUTO: 199 K/UL — SIGNIFICANT CHANGE UP (ref 150–400)
POTASSIUM SERPL-MCNC: 4.4 MMOL/L — SIGNIFICANT CHANGE UP (ref 3.5–5.3)
POTASSIUM SERPL-MCNC: 4.7 MMOL/L — SIGNIFICANT CHANGE UP (ref 3.5–5.3)
POTASSIUM SERPL-SCNC: 4.4 MMOL/L — SIGNIFICANT CHANGE UP (ref 3.5–5.3)
POTASSIUM SERPL-SCNC: 4.7 MMOL/L — SIGNIFICANT CHANGE UP (ref 3.5–5.3)
POTASSIUM UR-SCNC: 24 MMOL/L — SIGNIFICANT CHANGE UP
PROT ?TM UR-MCNC: 14 MG/DL — HIGH (ref 0–12)
PROT SERPL-MCNC: 6 G/DL — SIGNIFICANT CHANGE UP (ref 6–8.3)
PROT UR-MCNC: NEGATIVE MG/DL — SIGNIFICANT CHANGE UP
PROT/CREAT UR-RTO: 0.5 RATIO — HIGH (ref 0–0.2)
RBC # BLD: 3.89 M/UL — SIGNIFICANT CHANGE UP (ref 3.8–5.2)
RBC # FLD: 14.6 % — HIGH (ref 10.3–14.5)
RH IG SCN BLD-IMP: POSITIVE — SIGNIFICANT CHANGE UP
SODIUM SERPL-SCNC: 126 MMOL/L — LOW (ref 135–145)
SODIUM SERPL-SCNC: 129 MMOL/L — LOW (ref 135–145)
SODIUM UR-SCNC: 23 MMOL/L — SIGNIFICANT CHANGE UP
SODIUM UR-SCNC: 48 MMOL/L — SIGNIFICANT CHANGE UP
SP GR SPEC: 1.01 — SIGNIFICANT CHANGE UP (ref 1–1.03)
UROBILINOGEN FLD QL: 0.2 MG/DL — SIGNIFICANT CHANGE UP (ref 0.2–1)
UUN UR-MCNC: 225 MG/DL — SIGNIFICANT CHANGE UP
UUN UR-MCNC: 361 MG/DL — SIGNIFICANT CHANGE UP
WBC # BLD: 3.16 K/UL — LOW (ref 3.8–10.5)
WBC # FLD AUTO: 3.16 K/UL — LOW (ref 3.8–10.5)

## 2024-12-02 PROCEDURE — 99222 1ST HOSP IP/OBS MODERATE 55: CPT | Mod: GC,AI

## 2024-12-02 RX ORDER — VALSARTAN 320 MG/1
80 TABLET ORAL EVERY 24 HOURS
Refills: 0 | Status: DISCONTINUED | OUTPATIENT
Start: 2024-12-02 | End: 2024-12-02

## 2024-12-02 RX ORDER — ACETAMINOPHEN 500MG 500 MG/1
650 TABLET, COATED ORAL EVERY 6 HOURS
Refills: 0 | Status: DISCONTINUED | OUTPATIENT
Start: 2024-12-02 | End: 2024-12-04

## 2024-12-02 RX ORDER — NIFEDIPINE 10 MG
30 CAPSULE ORAL EVERY 24 HOURS
Refills: 0 | Status: DISCONTINUED | OUTPATIENT
Start: 2024-12-02 | End: 2024-12-02

## 2024-12-02 RX ORDER — ATENOLOL 100 MG/1
50 TABLET ORAL EVERY 24 HOURS
Refills: 0 | Status: DISCONTINUED | OUTPATIENT
Start: 2024-12-02 | End: 2024-12-02

## 2024-12-02 RX ORDER — SODIUM CHLORIDE 9 MG/ML
500 INJECTION, SOLUTION INTRAMUSCULAR; INTRAVENOUS; SUBCUTANEOUS ONCE
Refills: 0 | Status: COMPLETED | OUTPATIENT
Start: 2024-12-02 | End: 2024-12-02

## 2024-12-02 RX ORDER — ACETAMINOPHEN, DIPHENHYDRAMINE HCL, PHENYLEPHRINE HCL 325; 25; 5 MG/1; MG/1; MG/1
3 TABLET ORAL AT BEDTIME
Refills: 0 | Status: DISCONTINUED | OUTPATIENT
Start: 2024-12-02 | End: 2024-12-04

## 2024-12-02 RX ORDER — ENOXAPARIN SODIUM 30 MG/.3ML
40 INJECTION SUBCUTANEOUS EVERY 24 HOURS
Refills: 0 | Status: DISCONTINUED | OUTPATIENT
Start: 2024-12-02 | End: 2024-12-04

## 2024-12-02 RX ORDER — SPIRONOLACTONE 25 MG
50 TABLET ORAL EVERY 24 HOURS
Refills: 0 | Status: DISCONTINUED | OUTPATIENT
Start: 2024-12-02 | End: 2024-12-02

## 2024-12-02 RX ADMIN — ATENOLOL 50 MILLIGRAM(S): 100 TABLET ORAL at 07:41

## 2024-12-02 RX ADMIN — Medication 40 MILLIGRAM(S): at 21:10

## 2024-12-02 RX ADMIN — ACETAMINOPHEN 500MG 650 MILLIGRAM(S): 500 TABLET, COATED ORAL at 21:17

## 2024-12-02 RX ADMIN — ENOXAPARIN SODIUM 40 MILLIGRAM(S): 30 INJECTION SUBCUTANEOUS at 10:48

## 2024-12-02 RX ADMIN — Medication 81 MILLIGRAM(S): at 11:38

## 2024-12-02 RX ADMIN — ACETAMINOPHEN 500MG 650 MILLIGRAM(S): 500 TABLET, COATED ORAL at 20:17

## 2024-12-02 RX ADMIN — SODIUM CHLORIDE 500 MILLILITER(S): 9 INJECTION, SOLUTION INTRAMUSCULAR; INTRAVENOUS; SUBCUTANEOUS at 17:58

## 2024-12-02 NOTE — H&P ADULT - ASSESSMENT
This is a 89 year old female with a past medical history of hypertension and hyperlipidemia who is presenting for a headache found to have hyponatremia likely iso of hctz

## 2024-12-02 NOTE — PROGRESS NOTE ADULT - PROBLEM SELECTOR PLAN 4
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 Home meds atenolol 50, valsartan 80 qD, nifedipine 30 qD, spironolactone 50, hctz 12.5. Unknown if worked up for resistant hypertension. Consider uptitrating valsartan given holding other home meds.    Plan:   - hold home anti-hypertensives   - collateral from Dr. Guthrie PCP   - DASH diet

## 2024-12-02 NOTE — H&P ADULT - NSHPLABSRESULTS_GEN_ALL_CORE
LABS:                         13.5   3.14  )-----------( 210      ( 01 Dec 2024 18:17 )             39.5     12    127[L]  |  96  |  17  ----------------------------<  81  4.6   |  26  |  0.77    Ca    9.0      01 Dec 2024 20:52    TPro  7.6  /  Alb  4.1  /  TBili  0.4  /  DBili  x   /  AST  52[H]  /  ALT  48[H]  /  AlkPhos  95        Urinalysis Basic - ( 01 Dec 2024 22:59 )    Color: Yellow / Appearance: Clear / S.007 / pH: x  Gluc: x / Ketone: Negative mg/dL  / Bili: Negative / Urobili: 0.2 mg/dL   Blood: x / Protein: Negative mg/dL / Nitrite: Negative   Leuk Esterase: Moderate / RBC: 1 /HPF / WBC 10 /HPF   Sq Epi: x / Non Sq Epi: x / Bacteria: Moderate /HPF                RADIOLOGY, EKG & ADDITIONAL TESTS:

## 2024-12-02 NOTE — H&P ADULT - ATTENDING COMMENTS
Pt seen and examined with AM teaching team  89F w HTN, HLD p/w frontal headache, found to have hyponatremia and hyperkalemia    Additional hx gathered from pt's daughter  States pt was drinking little water around Thanksgiving Holidays. States she has been on the current regimen of anti-hypertensives and has not changed meds recently. Pt feels generally a little off - denies LH/dizziness, chest pain, dyspnea. Eager to eat/drink today.    #Hyponatremia - Na 129 from 125 on admission  Aleksey 23 suggesting hypovolemia  #Hyperkalemia - improving. 4.4 from 5.7 on admission    #HTN - home on atenolol 50, HCTZ 12.5, Nifedipine 30, Aldactone 50, Valsartan 80  #Headache - CT Head wo acute processes. Headache also possibly d/t hyponatreima    PPx: SQL  Plan  Symptoms likely d/t poor PO intake and hypotension d/t multiple anti-hypertensive medications  Encourage PO/Fluid intake. Repeat BMP, Aleksey, UOsm - showing hypovolemia.  Hold BP meds at this time.   Goal SBP <150.     DISPO: Home - pending Na improves. Anticipate DC in 24-48h  Above d/w housestaff

## 2024-12-02 NOTE — H&P ADULT - HISTORY OF PRESENT ILLNESS
This is a 89 year old female with a past medical history of hypertension and hyperlipidemia who is presenting for a headache. She reports that she woke up this morning with a throbbing headache that she rates 6-7/10. Reports she took tylenol for this, which has not helped and had prompted her to come into the hospital. Reports she has been chronically taking her medications and she took them this morning. Denies thunder clap headache, nausea, dizziness or head trauma. Denies any uri symptoms, chest pain, shortness of breath or abdominal pain or any confusion.    [facility]   Vitals: 98.1, HR: 59, HR: 154/64, RR: 17, 99% on RA   Labs: WBC: 3.14, BMP: 125, K: 5.7, Cl: 95, AST/ALT: 52/48  EKG: nsr 63, qtc 429, non ischemic  Imaging: CT head non con: No intracranial abnormality, voluse loss and ischemic disease, sinus disease  Interventions: 500 cc bolus NS, tylenol 650 mg  This is a 89 year old female with a past medical history of hypertension and hyperlipidemia who is presenting for a headache. She reports that she woke up this morning with a throbbing headache that she rates 6-7/10. Reports she took tylenol for this, which has not helped and had prompted her to come into the hospital. Reports she has been chronically taking her bp medications and she took them this morning, which had not helped the headache. Denies thunder clap headache, nausea, dizziness or head trauma. Denies any uri symptoms, chest pain, shortness of breath or abdominal pain or any confusion.    [facility]   Vitals: 98.1, HR: 59, HR: 154/64, RR: 17, 99% on RA   Labs: WBC: 3.14, BMP: 125, K: 5.7, Cl: 95, AST/ALT: 52/48  EKG: nsr 63, qtc 429, non ischemic  Imaging: CT head non con: No intracranial abnormality, voluse loss and ischemic disease, sinus disease  Interventions: 500 cc bolus NS, tylenol 650 mg

## 2024-12-02 NOTE — H&P ADULT - PROBLEM SELECTOR PLAN 6
Mildly elevated and lateral since 5/23    - continue to monitor Mildly elevated and lateral since 5/23    - continue to trend

## 2024-12-02 NOTE — H&P ADULT - NSHPPHYSICALEXAM_GEN_ALL_CORE
T(C): 36.3 (12-02-24 @ 04:16), Max: 36.7 (12-01-24 @ 16:57)  HR: 58 (12-02-24 @ 04:16) (58 - 65)  BP: 132/71 (12-02-24 @ 04:16) (128/71 - 154/64)  RR: 18 (12-02-24 @ 04:16) (16 - 18)  SpO2: 96% (12-02-24 @ 04:16) (95% - 99%)    General: NAD, laying in bed, speaking in full sentences  HEENT: head NC/AT, no conjunctival injection, EOMI, MMM  Neck: supple, no JVD  Cardio: RRR, +S1/S2, no M/R/G  Resp: lungs CTAB, no cough/wheezes/rales/rhonchi  Abdo: soft, NT, ND, +bowel sounds x4, no organomegaly or palpable mass    Extremities: WWP, no edema/cyanosis/clubbing   Vasc: 2+ radial and DP pulses b/l  Neuro: A&Ox3  Psych: speech non-pressured, thoughts goal-oriented  Skin: dry, intact, no visible jaundice   MSK: no joint swelling T(C): 36.3 (12-02-24 @ 04:16), Max: 36.7 (12-01-24 @ 16:57)  HR: 58 (12-02-24 @ 04:16) (58 - 65)  BP: 132/71 (12-02-24 @ 04:16) (128/71 - 154/64)  RR: 18 (12-02-24 @ 04:16) (16 - 18)  SpO2: 96% (12-02-24 @ 04:16) (95% - 99%)    General: NAD  HEENT: cn 2-12 intact, mmm  Neck: supple, no JVD  Cardio: rrr  Resp: lungs CTAB  Abdo: soft, NT, ND  Extremities: WWP, no edema  Vasc: 2+ radial and DP pulses b/l  Neuro: no focal neurologic deficits, 5/5 strength of upper and lower extremities  Skin: dry, intact, no visible jaundice

## 2024-12-02 NOTE — H&P ADULT - PROBLEM SELECTOR PLAN 1
Symptomatic with headache, with previous na 130s. Could be 2/2 to HCTZ use, first prescription in [facility] from late 2023. On admission Na 125 and uptrending to 127, received 500 ns bolus in ED. Denies decreased po intake.    Plan:   - check urine studies and serum osm  - hold HCTZ

## 2024-12-02 NOTE — PATIENT PROFILE ADULT - FALL HARM RISK - HARM RISK INTERVENTIONS

## 2024-12-02 NOTE — PROGRESS NOTE ADULT - SUBJECTIVE AND OBJECTIVE BOX
O/N Events:  Subjective/ROS: Denies HA, CP, SOB, n/v, changes in bowel/urinary habits.  12pt ROS otherwise negative.    VITALS  Vital Signs Last 12 Hrs  T(F): 97.6 (24 @ 06:56), Max: 97.6 (24 @ 06:54)  HR: 94 (24 @ 06:56) (58 - 94)  BP: 118/74 (24 @ 06:56) (113/69 - 132/71)  BP(mean): --  RR: 18 (24 @ 06:56) (16 - 18)  SpO2: 98% (24 @ 06:56) (95% - 98%)  I&O's Summary      PHYSICAL EXAM  General: A&Ox3; NAD  Head: NC/AT; MMM; PERRL; EOMI;  Neck: Supple; no JVD  Respiratory: CTA B/L; no wheezes/crackles   Cardiovascular: Regular rhythm/rate; S1/S2   Gastrointestinal: Soft; NTND; normoactive BS  Extremities: WWP; no edema/cyanosis  Neurological:  CNII-XII grossly intact; no obvious focal deficits    MEDICATIONS  (STANDING):  aspirin enteric coated 81 milliGRAM(s) Oral daily  atenolol  Tablet 50 milliGRAM(s) Oral every 24 hours  atorvastatin 40 milliGRAM(s) Oral at bedtime  NIFEdipine XL 30 milliGRAM(s) Oral every 24 hours  valsartan 80 milliGRAM(s) Oral every 24 hours    MEDICATIONS  (PRN):  acetaminophen     Tablet .. 650 milliGRAM(s) Oral every 6 hours PRN Temp greater or equal to 38C (100.4F), Mild Pain (1 - 3)  melatonin 3 milliGRAM(s) Oral at bedtime PRN Insomnia      No Known Allergies      LABS                        13.5   3.14  )-----------( 210      ( 01 Dec 2024 18:17 )             39.5     12    127[L]  |  96  |  17  ----------------------------<  81  4.6   |  26  |  0.77    Ca    9.0      01 Dec 2024 20:52    TPro  7.6  /  Alb  4.1  /  TBili  0.4  /  DBili  x   /  AST  52[H]  /  ALT  48[H]  /  AlkPhos  95  12-      Urinalysis Basic - ( 01 Dec 2024 22:59 )    Color: Yellow / Appearance: Clear / S.007 / pH: x  Gluc: x / Ketone: Negative mg/dL  / Bili: Negative / Urobili: 0.2 mg/dL   Blood: x / Protein: Negative mg/dL / Nitrite: Negative   Leuk Esterase: Moderate / RBC: 1 /HPF / WBC 10 /HPF   Sq Epi: x / Non Sq Epi: x / Bacteria: Moderate /HPF            IMAGING/EKG/ETC  EKG:  Xray:  CT:  MRI: O/N Events: NAEON     Subjective/ROS:  Patient seen and examined at bedside. She reports still have an 8/10 frontal headache that extends to the top of her head. Denies lightheadedness, changes to vision, or dizziness. Denies shortness of breath, chest pain, nausea, vomiting or abdominal pain. No changes to urinary or bowel habits but she does note increased urination that has been chronic over the past year.     VITALS  Vital Signs Last 12 Hrs  T(F): 97.6 (24 @ 06:56), Max: 97.6 (24 @ 06:54)  HR: 94 (24 @ 06:56) (58 - 94)  BP: 118/74 (24 @ 06:56) (113/69 - 132/71)  BP(mean): --  RR: 18 (24 @ 06:56) (16 - 18)  SpO2: 98% (24 @ 06:56) (95% - 98%)  I&O's Summary      PHYSICAL EXAM  General: A&Ox3; NAD  Head: NC/AT; MMM; PERRL; EOMI; no APD   Neck: Supple; no JVD  Back: kyphosis   Respiratory: CTA B/L; no wheezes/crackles   Cardiovascular: Regular rhythm/rate; S1/S2   Gastrointestinal: Soft; NTND; normoactive BS  Extremities: WWP; no edema/cyanosis  Neurological:  CNII-XII grossly intact; no obvious focal deficits    MEDICATIONS  (STANDING):  aspirin enteric coated 81 milliGRAM(s) Oral daily  atenolol  Tablet 50 milliGRAM(s) Oral every 24 hours  atorvastatin 40 milliGRAM(s) Oral at bedtime  NIFEdipine XL 30 milliGRAM(s) Oral every 24 hours  valsartan 80 milliGRAM(s) Oral every 24 hours    MEDICATIONS  (PRN):  acetaminophen     Tablet .. 650 milliGRAM(s) Oral every 6 hours PRN Temp greater or equal to 38C (100.4F), Mild Pain (1 - 3)  melatonin 3 milliGRAM(s) Oral at bedtime PRN Insomnia      No Known Allergies      LABS                        13.5   3.14  )-----------( 210      ( 01 Dec 2024 18:17 )             39.5     12-01    127[L]  |  96  |  17  ----------------------------<  81  4.6   |  26  |  0.77    Ca    9.0      01 Dec 2024 20:52    TPro  7.6  /  Alb  4.1  /  TBili  0.4  /  DBili  x   /  AST  52[H]  /  ALT  48[H]  /  AlkPhos  95  12-01      Urinalysis Basic - ( 01 Dec 2024 22:59 )    Color: Yellow / Appearance: Clear / S.007 / pH: x  Gluc: x / Ketone: Negative mg/dL  / Bili: Negative / Urobili: 0.2 mg/dL   Blood: x / Protein: Negative mg/dL / Nitrite: Negative   Leuk Esterase: Moderate / RBC: 1 /HPF / WBC 10 /HPF   Sq Epi: x / Non Sq Epi: x / Bacteria: Moderate /HPF            IMAGING/EKG/ETC: Reviewed.

## 2024-12-02 NOTE — H&P ADULT - PROBLEM SELECTOR PLAN 4
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 Home meds atenolol 50, valsartan 80 qD, nifedipine 30 qD, spironolactone 50, hctz 12.5. Unknown if worked up for resistant hypertension. Consider uptitrating valsartan given holding other home meds.    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 Home meds atenolol 50, valsartan 80 qD, nifedipine 30 qD, spironolactone 50, hctz 12.5. Unknown if worked up for resistant hypertension. Consider uptitrating valsartan given holding other home meds.    Plan:   - c/w atenolol 50, valsartan 80, and nifedipine  - hold spironoactone and hctz  - DASH diet

## 2024-12-02 NOTE — H&P ADULT - PROBLEM SELECTOR PLAN 3
No red flag signs. CTH without heme. Likely iso of hyponatremia. No other neurologic symptoms other than headache.    - treat underlying hyponatremia  - continue to monitor

## 2024-12-03 ENCOUNTER — TRANSCRIPTION ENCOUNTER (OUTPATIENT)
Age: 88
End: 2024-12-03

## 2024-12-03 LAB
ADD ON TEST-SPECIMEN IN LAB: SIGNIFICANT CHANGE UP
ALBUMIN SERPL ELPH-MCNC: 4.3 G/DL — SIGNIFICANT CHANGE UP (ref 3.3–5)
ALP SERPL-CCNC: 84 U/L — SIGNIFICANT CHANGE UP (ref 40–120)
ALT FLD-CCNC: 33 U/L — SIGNIFICANT CHANGE UP (ref 10–45)
ANION GAP SERPL CALC-SCNC: 7 MMOL/L — SIGNIFICANT CHANGE UP (ref 5–17)
APPEARANCE UR: ABNORMAL
AST SERPL-CCNC: 31 U/L — SIGNIFICANT CHANGE UP (ref 10–40)
BASOPHILS # BLD AUTO: 0.03 K/UL — SIGNIFICANT CHANGE UP (ref 0–0.2)
BASOPHILS NFR BLD AUTO: 0.9 % — SIGNIFICANT CHANGE UP (ref 0–2)
BILIRUB SERPL-MCNC: 0.2 MG/DL — SIGNIFICANT CHANGE UP (ref 0.2–1.2)
BILIRUB UR-MCNC: NEGATIVE — SIGNIFICANT CHANGE UP
BUN SERPL-MCNC: 12 MG/DL — SIGNIFICANT CHANGE UP (ref 7–23)
BUN SERPL-MCNC: 12 MG/DL — SIGNIFICANT CHANGE UP (ref 7–23)
BUN SERPL-MCNC: 14 MG/DL — SIGNIFICANT CHANGE UP (ref 7–23)
CALCIUM SERPL-MCNC: 8.9 MG/DL — SIGNIFICANT CHANGE UP (ref 8.4–10.5)
CALCIUM SERPL-MCNC: 9.3 MG/DL — SIGNIFICANT CHANGE UP (ref 8.4–10.5)
CALCIUM SERPL-MCNC: 9.3 MG/DL — SIGNIFICANT CHANGE UP (ref 8.4–10.5)
CHLORIDE SERPL-SCNC: 92 MMOL/L — LOW (ref 96–108)
CHLORIDE SERPL-SCNC: 93 MMOL/L — LOW (ref 96–108)
CHLORIDE SERPL-SCNC: 97 MMOL/L — SIGNIFICANT CHANGE UP (ref 96–108)
CO2 SERPL-SCNC: 25 MMOL/L — SIGNIFICANT CHANGE UP (ref 22–31)
CO2 SERPL-SCNC: 26 MMOL/L — SIGNIFICANT CHANGE UP (ref 22–31)
CO2 SERPL-SCNC: 28 MMOL/L — SIGNIFICANT CHANGE UP (ref 22–31)
COLOR SPEC: YELLOW — SIGNIFICANT CHANGE UP
CREAT ?TM UR-MCNC: 20 MG/DL — SIGNIFICANT CHANGE UP
CREAT SERPL-MCNC: 0.58 MG/DL — SIGNIFICANT CHANGE UP (ref 0.5–1.3)
CREAT SERPL-MCNC: 0.64 MG/DL — SIGNIFICANT CHANGE UP (ref 0.5–1.3)
CREAT SERPL-MCNC: 0.73 MG/DL — SIGNIFICANT CHANGE UP (ref 0.5–1.3)
DIFF PNL FLD: ABNORMAL
EGFR: 79 ML/MIN/1.73M2 — SIGNIFICANT CHANGE UP
EGFR: 84 ML/MIN/1.73M2 — SIGNIFICANT CHANGE UP
EGFR: 86 ML/MIN/1.73M2 — SIGNIFICANT CHANGE UP
EOSINOPHIL # BLD AUTO: 0.11 K/UL — SIGNIFICANT CHANGE UP (ref 0–0.5)
EOSINOPHIL NFR BLD AUTO: 3.1 % — SIGNIFICANT CHANGE UP (ref 0–6)
GLUCOSE SERPL-MCNC: 111 MG/DL — HIGH (ref 70–99)
GLUCOSE SERPL-MCNC: 111 MG/DL — HIGH (ref 70–99)
GLUCOSE SERPL-MCNC: 91 MG/DL — SIGNIFICANT CHANGE UP (ref 70–99)
GLUCOSE UR QL: NEGATIVE MG/DL — SIGNIFICANT CHANGE UP
HCT VFR BLD CALC: 37 % — SIGNIFICANT CHANGE UP (ref 34.5–45)
HGB BLD-MCNC: 12.4 G/DL — SIGNIFICANT CHANGE UP (ref 11.5–15.5)
IMM GRANULOCYTES NFR BLD AUTO: 1.4 % — HIGH (ref 0–0.9)
KETONES UR-MCNC: NEGATIVE MG/DL — SIGNIFICANT CHANGE UP
LEUKOCYTE ESTERASE UR-ACNC: ABNORMAL
LYMPHOCYTES # BLD AUTO: 1.2 K/UL — SIGNIFICANT CHANGE UP (ref 1–3.3)
LYMPHOCYTES # BLD AUTO: 34.3 % — SIGNIFICANT CHANGE UP (ref 13–44)
MAGNESIUM SERPL-MCNC: 1.9 MG/DL — SIGNIFICANT CHANGE UP (ref 1.6–2.6)
MCHC RBC-ENTMCNC: 30.2 PG — SIGNIFICANT CHANGE UP (ref 27–34)
MCHC RBC-ENTMCNC: 33.5 G/DL — SIGNIFICANT CHANGE UP (ref 32–36)
MCV RBC AUTO: 90 FL — SIGNIFICANT CHANGE UP (ref 80–100)
MONOCYTES # BLD AUTO: 0.35 K/UL — SIGNIFICANT CHANGE UP (ref 0–0.9)
MONOCYTES NFR BLD AUTO: 10 % — SIGNIFICANT CHANGE UP (ref 2–14)
NEUTROPHILS # BLD AUTO: 1.76 K/UL — LOW (ref 1.8–7.4)
NEUTROPHILS NFR BLD AUTO: 50.3 % — SIGNIFICANT CHANGE UP (ref 43–77)
NITRITE UR-MCNC: NEGATIVE — SIGNIFICANT CHANGE UP
NRBC # BLD: 0 /100 WBCS — SIGNIFICANT CHANGE UP (ref 0–0)
PH UR: 6.5 — SIGNIFICANT CHANGE UP (ref 5–8)
PHOSPHATE SERPL-MCNC: 3.2 MG/DL — SIGNIFICANT CHANGE UP (ref 2.5–4.5)
PLATELET # BLD AUTO: 207 K/UL — SIGNIFICANT CHANGE UP (ref 150–400)
POTASSIUM SERPL-MCNC: 4.2 MMOL/L — SIGNIFICANT CHANGE UP (ref 3.5–5.3)
POTASSIUM SERPL-MCNC: 4.5 MMOL/L — SIGNIFICANT CHANGE UP (ref 3.5–5.3)
POTASSIUM SERPL-MCNC: 4.6 MMOL/L — SIGNIFICANT CHANGE UP (ref 3.5–5.3)
POTASSIUM SERPL-SCNC: 4.2 MMOL/L — SIGNIFICANT CHANGE UP (ref 3.5–5.3)
POTASSIUM SERPL-SCNC: 4.5 MMOL/L — SIGNIFICANT CHANGE UP (ref 3.5–5.3)
POTASSIUM SERPL-SCNC: 4.6 MMOL/L — SIGNIFICANT CHANGE UP (ref 3.5–5.3)
POTASSIUM UR-SCNC: 17 MMOL/L — SIGNIFICANT CHANGE UP
PROT ?TM UR-MCNC: 5 MG/DL — SIGNIFICANT CHANGE UP (ref 0–12)
PROT SERPL-MCNC: 6.6 G/DL — SIGNIFICANT CHANGE UP (ref 6–8.3)
PROT UR-MCNC: NEGATIVE MG/DL — SIGNIFICANT CHANGE UP
PROT/CREAT UR-RTO: 0.2 RATIO — SIGNIFICANT CHANGE UP (ref 0–0.2)
RBC # BLD: 4.11 M/UL — SIGNIFICANT CHANGE UP (ref 3.8–5.2)
RBC # FLD: 15 % — HIGH (ref 10.3–14.5)
SODIUM SERPL-SCNC: 125 MMOL/L — LOW (ref 135–145)
SODIUM SERPL-SCNC: 127 MMOL/L — LOW (ref 135–145)
SODIUM SERPL-SCNC: 130 MMOL/L — LOW (ref 135–145)
SODIUM UR-SCNC: <20 MMOL/L — SIGNIFICANT CHANGE UP
SP GR SPEC: 1.01 — SIGNIFICANT CHANGE UP (ref 1–1.03)
UROBILINOGEN FLD QL: 0.2 MG/DL — SIGNIFICANT CHANGE UP (ref 0.2–1)
UUN UR-MCNC: 230 MG/DL — SIGNIFICANT CHANGE UP
WBC # BLD: 3.5 K/UL — LOW (ref 3.8–10.5)
WBC # FLD AUTO: 3.5 K/UL — LOW (ref 3.8–10.5)

## 2024-12-03 PROCEDURE — 99232 SBSQ HOSP IP/OBS MODERATE 35: CPT | Mod: GC

## 2024-12-03 RX ORDER — VALSARTAN 320 MG/1
0 TABLET ORAL
Refills: 0 | DISCHARGE

## 2024-12-03 RX ORDER — SPIRONOLACTONE 25 MG
1 TABLET ORAL
Refills: 0 | DISCHARGE

## 2024-12-03 RX ORDER — ATENOLOL 100 MG/1
1 TABLET ORAL
Refills: 0 | DISCHARGE

## 2024-12-03 RX ORDER — SODIUM CHLORIDE 9 MG/ML
500 INJECTION, SOLUTION INTRAMUSCULAR; INTRAVENOUS; SUBCUTANEOUS ONCE
Refills: 0 | Status: COMPLETED | OUTPATIENT
Start: 2024-12-03 | End: 2024-12-03

## 2024-12-03 RX ADMIN — SODIUM CHLORIDE 500 MILLILITER(S): 9 INJECTION, SOLUTION INTRAMUSCULAR; INTRAVENOUS; SUBCUTANEOUS at 14:29

## 2024-12-03 RX ADMIN — Medication 81 MILLIGRAM(S): at 11:53

## 2024-12-03 RX ADMIN — ENOXAPARIN SODIUM 40 MILLIGRAM(S): 30 INJECTION SUBCUTANEOUS at 10:32

## 2024-12-03 NOTE — PROGRESS NOTE ADULT - SUBJECTIVE AND OBJECTIVE BOX
O/N Events:  Subjective/ROS: Denies HA, CP, SOB, n/v, changes in bowel/urinary habits.  12pt ROS otherwise negative.    VITALS  Vital Signs Last 12 Hrs  T(F): 97.4 (24 @ 05:59), Max: 97.4 (24 @ 05:59)  HR: 72 (24 @ 05:59) (72 - 72)  BP: 145/74 (24 @ 05:59) (111/68 - 145/74)  BP(mean): --  RR: 18 (24 @ 05:59) (18 - 18)  SpO2: 98% (24 @ 05:59) (98% - 98%)  I&O's Summary      PHYSICAL EXAM  General: A&Ox3; NAD  Head: NC/AT; MMM; PERRL; EOMI;  Neck: Supple; no JVD  Respiratory: CTA B/L; no wheezes/crackles   Cardiovascular: Regular rhythm/rate; S1/S2   Gastrointestinal: Soft; NTND; normoactive BS  Extremities: WWP; no edema/cyanosis  Neurological:  CNII-XII grossly intact; no obvious focal deficits    MEDICATIONS  (STANDING):  aspirin enteric coated 81 milliGRAM(s) Oral daily  atorvastatin 40 milliGRAM(s) Oral at bedtime  enoxaparin Injectable 40 milliGRAM(s) SubCutaneous every 24 hours    MEDICATIONS  (PRN):  acetaminophen     Tablet .. 650 milliGRAM(s) Oral every 6 hours PRN Temp greater or equal to 38C (100.4F), Mild Pain (1 - 3)  melatonin 3 milliGRAM(s) Oral at bedtime PRN Insomnia      No Known Allergies      LABS                        11.3   3.16  )-----------( 199      ( 02 Dec 2024 05:30 )             33.8     12-02    126[L]  |  94[L]  |  12  ----------------------------<  153[H]  4.7   |  20[L]  |  0.52    Ca    9.2      02 Dec 2024 15:09  Phos  3.3     12-02  Mg     1.9     12-    TPro  6.0  /  Alb  3.8  /  TBili  0.2  /  DBili  x   /  AST  28  /  ALT  28  /  AlkPhos  79  12-02      Urinalysis Basic - ( 02 Dec 2024 17:19 )    Color: Yellow / Appearance: Clear / S.007 / pH: x  Gluc: x / Ketone: Negative mg/dL  / Bili: Negative / Urobili: 0.2 mg/dL   Blood: x / Protein: Negative mg/dL / Nitrite: Negative   Leuk Esterase: Large / RBC: 5 /HPF /  /HPF   Sq Epi: x / Non Sq Epi: 3 /HPF / Bacteria: Occasional /HPF            IMAGING/EKG/ETC  EKG:  Xray:  CT:  MRI: O/N Events: NAEON     Subjective/ROS:  Patient seen and examined at bedside. She reports a minimal headache today, tylenol has been helping. Denies changes to vision, lightheadedness, dizziness. No shortness of breath, chest pain, nausea, vomiting, or abdominal pain. Denies changes to urinary or bowel habits, last bowel movement was yesterday.     VITALS  Vital Signs Last 12 Hrs  T(F): 97.4 (24 @ 05:59), Max: 97.4 (24 @ 05:59)  HR: 72 (24 @ 05:59) (72 - 72)  BP: 145/74 (24 @ 05:59) (111/68 - 145/74)  BP(mean): --  RR: 18 (24 @ 05:59) (18 - 18)  SpO2: 98% (24 @ 05:59) (98% - 98%)  I&O's Summary      PHYSICAL EXAM  General: A&Ox3; NAD  Head: NC/AT; MMM; PERRL; EOMI; no APD   Neck: Supple; no JVD  Back: kyphosis   Respiratory: CTA B/L; no wheezes/crackles   Cardiovascular: Regular rhythm/rate; S1/S2   Gastrointestinal: Soft; NTND; normoactive BS  Extremities: WWP; no edema/cyanosis  Neurological:  CNII-XII grossly intact; no obvious focal deficits    MEDICATIONS  (STANDING):  aspirin enteric coated 81 milliGRAM(s) Oral daily  atorvastatin 40 milliGRAM(s) Oral at bedtime  enoxaparin Injectable 40 milliGRAM(s) SubCutaneous every 24 hours    MEDICATIONS  (PRN):  acetaminophen     Tablet .. 650 milliGRAM(s) Oral every 6 hours PRN Temp greater or equal to 38C (100.4F), Mild Pain (1 - 3)  melatonin 3 milliGRAM(s) Oral at bedtime PRN Insomnia      No Known Allergies      LABS                        11.3   3.16  )-----------( 199      ( 02 Dec 2024 05:30 )             33.8     12-    126[L]  |  94[L]  |  12  ----------------------------<  153[H]  4.7   |  20[L]  |  0.52    Ca    9.2      02 Dec 2024 15:09  Phos  3.3     12-  Mg     1.9     12-    TPro  6.0  /  Alb  3.8  /  TBili  0.2  /  DBili  x   /  AST  28  /  ALT  28  /  AlkPhos  79  -      Urinalysis Basic - ( 02 Dec 2024 17:19 )    Color: Yellow / Appearance: Clear / S.007 / pH: x  Gluc: x / Ketone: Negative mg/dL  / Bili: Negative / Urobili: 0.2 mg/dL   Blood: x / Protein: Negative mg/dL / Nitrite: Negative   Leuk Esterase: Large / RBC: 5 /HPF /  /HPF   Sq Epi: x / Non Sq Epi: 3 /HPF / Bacteria: Occasional /HPF            IMAGING/EKG/ETC: Reviewed.

## 2024-12-03 NOTE — PROGRESS NOTE ADULT - ATTENDING COMMENTS
acetaminophen     Tablet .. 650 milliGRAM(s) Oral every 6 hours PRN  aspirin enteric coated 81 milliGRAM(s) Oral daily  atorvastatin 40 milliGRAM(s) Oral at bedtime  enoxaparin Injectable 40 milliGRAM(s) SubCutaneous every 24 hours  melatonin 3 milliGRAM(s) Oral at bedtime PRN    A/P: 89 year old f w/pmhx of HTN/HLD presented headache found to have hyponatremia likely iso of HCTZ.     #Headache ( resolved)   -Pt s/p CT Brain w/o which did not show any evidence of bleed  -Will continue to monitor     #Hyponatremia likely 2/2 HCTZ  -Pt's Na 130->127   -Will continue to hold HCTZ   -Urine Osm 254 and Urine 23  -Will continue to monitor Na    #HTN/HLD   -Home medications: Atenolol 50mg daily, valsartan 80mg  daily, nifedipine 30 qD, spironolactone 50mg daily , hctz 12.5mhg daily   -Pt's BP is WNL off antihypertensives   -Will continue Atorvastatin and ASA    #Asymptomatic bacteriuria  -Pt w/o any S&S of infection; Pt afebrile and no leukocytosis   -Will continue to monitor off abx     #DVT prop  -Enoxaparin     40 minutes spent on total encounter. The necessity of the time spent during the encounter on this date of service was due to:    Review of hospital course, labs, vitals, radiology and medical records.  Direct patient encounter including bedside exam   Discussed plan of care with primary team   Documenting the encounter.
see attending attestation in H/P

## 2024-12-03 NOTE — DISCHARGE NOTE PROVIDER - NSDCCPCAREPLAN_GEN_ALL_CORE_FT
PRINCIPAL DISCHARGE DIAGNOSIS  Diagnosis: Hyponatremia  Assessment and Plan of Treatment: You came to the hospital for an unresolving headache and were found to have low sodium which can cause symptoms like headache so you were admitted for further monitoring and correction. You have a history of hypertension and are on numerous anti-hypertensive medications that can cause electrolyte imbalances such as low sodium. We held your medications during your admission, encouraged oral intake, gave you some fluids with sodium through an IV, and your sodium improved. Please follow up with Dr. Guthrie in 1 week to adjust home medication regimen.     PRINCIPAL DISCHARGE DIAGNOSIS  Diagnosis: Hyponatremia  Assessment and Plan of Treatment: You came to the hospital for an unresolving headache and were found to have low sodium which can cause symptoms like headache so you were admitted for further monitoring and correction. You have a history of hypertension and are on numerous anti-hypertensive medications that can cause electrolyte imbalances such as low sodium. We held your medications during your admission, encouraged oral intake, gave you some fluids with sodium through an IV, and your sodium improved. Please do not take any anti-hypertensive medications upon discharge and follow up with Dr. Guthrie on Thursday, 12/5 at 10:30a.     PRINCIPAL DISCHARGE DIAGNOSIS  Diagnosis: Hyponatremia  Assessment and Plan of Treatment: You came to the hospital for an unresolving headache and were found to have low sodium which can cause symptoms like headache so you were admitted for further monitoring and correction. You have a history of hypertension and are on numerous anti-hypertensive medications that can cause electrolyte imbalances such as low sodium. We held your medications during your admission, encouraged oral intake, gave you some fluids with sodium through an IV, and your sodium improved. Please do not take any anti-hypertensive medications upon discharge, limit drinking fluids to 2L per day, and follow up with Dr. Guthrie on Thursday, 12/5 at 10:30a.

## 2024-12-03 NOTE — PROGRESS NOTE ADULT - PROBLEM SELECTOR PLAN 7
Plan:  F: 500c ns x1  E: replete K<4, Mg<2  N: dash  VTE Prophylaxis: lovenox  D: RMF Plan:  F: 500c ns x3  E: replete K<4, Mg<2  N: regular  VTE Prophylaxis: lovenox  D: RMF

## 2024-12-03 NOTE — PROGRESS NOTE ADULT - PROBLEM SELECTOR PLAN 4
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 Home meds atenolol 50, valsartan 80 qD, nifedipine 30 qD, spironolactone 50, hctz 12.5. Unknown if worked up for resistant hypertension. Consider uptitrating valsartan given holding other home meds.    Plan:   - hold home anti-hypertensives   - collateral from Dr. 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 Home meds atenolol 50, valsartan 80 qD, nifedipine 30 qD, spironolactone 50, hctz 12.5. Unknown if worked up for resistant hypertension. Consider uptitrating valsartan given holding other home meds.   12/2 Spoke to Dr. Guthrie who agreed to holding all antihypertensives and he will see the patient on Thursday for adjustment of medication regimen. Clinic BPs 170s/80s, Na >130.     Plan:   - hold home anti-hypertensives

## 2024-12-03 NOTE — DISCHARGE NOTE PROVIDER - CARE PROVIDER_API CALL
[facility]  Internal Medicine  [address] [place] [id number]  Phone: [phone]  Fax: [phone]  Follow Up Time: 1 week   [facility]  Internal Medicine  [address] [place] [id number]  Phone: [phone]  Fax: [phone]  Established Patient  Scheduled Appointment: 12/05/2024 10:30 AM

## 2024-12-03 NOTE — PROGRESS NOTE ADULT - PROBLEM SELECTOR PLAN 6
Mildly elevated and lateral since 5/23    - continue to trend Mildly elevated and lateral since 5/23  RESOLVED 12/2

## 2024-12-03 NOTE — DISCHARGE NOTE PROVIDER - HOSPITAL COURSE
Hospital course:  This is a 89 year old female with a past medical history of hypertension and hyperlipidemia who is presenting for a headache found to have hyponatremia likely iso of HCTZ. During her admission, all anti-hypertensive were held and slowly re-introduced. Hyponatremia was likely due to hypovolemic hyponatremia due to poor PO intake and over diuresis, sodium was monitored and corrected after NS bolus and encourage of PO intake.     Hyponatremia.   Symptomatic with headache, with previous na 130s. Could be 2/2 to HCTZ use, [facility] On admission Na 125 and uptrending to 127, received 500 ns bolus in ED.     - hold HCTZ.    Hyperkalemia.   Now normalized, could be i/s/o spironolactone. Asx no EKG changes.    - hold spironolactone.    Headache, unspecified.   No red flag signs. CTH without heme. Likely iso of hyponatremia. No other neurologic symptoms other than headache.    - treat underlying hyponatremia    Hypertension.   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 Home meds atenolol 50, valsartan 80 qD, nifedipine 30 qD, spironolactone 50, hctz 12.5. Unknown if worked up for resistant hypertension. Consider uptitrating valsartan given holding other home meds.    - hold home anti-hypertensives   - collateral from Dr. Guthrie PCP   - DASH diet.    Hyperlipidemia.   Home med atorvastatin 40    - c/w home med.    Transaminitis.   Mildly elevated and lateral since 5/23  RESOLVED.     Physical exam at discharge:  General: A&Ox3; NAD  Head: NC/AT; MMM; PERRL; EOMI; no APD   Neck: Supple; no JVD  Back: kyphosis   Respiratory: CTA B/L; no wheezes/crackles   Cardiovascular: Regular rhythm/rate; S1/S2   Gastrointestinal: Soft; NTND; normoactive BS  Extremities: WWP; no edema/cyanosis  Neurological:  CNII-XII grossly intact; no obvious focal deficits    New medications on discharge: none   Labs to be followed up: none   Imaging to be done as outpatient: none   Further outpatient workup: Dr. Guthrie PCP in 1-2 weeks    Hospital course:  This is a 89 year old female with a past medical history of hypertension and hyperlipidemia who is presenting for a headache found to have hyponatremia likely iso of HCTZ. During her admission, all anti-hypertensive were held and slowly re-introduced. Hyponatremia was likely due to hypovolemic hyponatremia due to poor PO intake and over diuresis, sodium was monitored and corrected after NS bolus and encourage of PO intake.     Hyponatremia.   Symptomatic with headache, with previous na 130s. Could be 2/2 to HCTZ use, [facility]. On admission Na 125 and uptrending to 127, received 500 ns bolus in ED.     - hold HCTZ.    Hyperkalemia.   Now normalized, could be i/s/o spironolactone. Asx no EKG changes.    - hold spironolactone.    Headache, unspecified.   No red flag signs. CTH without heme. Likely iso of hyponatremia. No other neurologic symptoms other than headache.    - treat underlying hyponatremia    Hypertension.   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 Home meds atenolol 50, valsartan 80 qD, nifedipine 30 qD, spironolactone 50, hctz 12.5. Unknown if worked up for resistant hypertension. Consider uptitrating valsartan given holding other home meds. Spoke to Dr. Guthrie who agreed to holding all antihypertensives and he will see the patient on Thursday for adjustment of medication regimen.     - hold home anti-hypertensives   - DASH diet.    Hyperlipidemia.   Home med atorvastatin 40    - c/w home med.    Transaminitis.   Mildly elevated and lateral since 5/23  RESOLVED.     Physical exam at discharge:  General: A&Ox3; NAD  Head: NC/AT; MMM; PERRL; EOMI; no APD   Neck: Supple; no JVD  Back: kyphosis   Respiratory: CTA B/L; no wheezes/crackles   Cardiovascular: Regular rhythm/rate; S1/S2   Gastrointestinal: Soft; NTND; normoactive BS  Extremities: WWP; no edema/cyanosis  Neurological:  CNII-XII grossly intact; no obvious focal deficits    New medications on discharge: none   Labs to be followed up: none   Imaging to be done as outpatient: none   Further outpatient workup: Dr. Guthrie PCP in 1-2 weeks    Hospital course:  This is a 89 year old female with a past medical history of hypertension and hyperlipidemia who is presenting for a headache found to have hyponatremia likely iso of HCTZ. During her admission, all anti-hypertensive were held. Hyponatremia was likely due to hypovolemic hyponatremia due to poor PO intake vs over diuresis, sodium was monitored and corrected after NS administration and encouragement of PO intake.     Hyponatremia.   Symptomatic with headache, with previous Na 130s. Could be 2/2 to HCTZ use, [facility] On admission Na 125 and uptrending to 127, received 500 ns bolus in ED.     - hold HCTZ.    Hyperkalemia.   Now normalized, could be i/s/o spironolactone. Asx no EKG changes.    - hold spironolactone.    Headache, unspecified.   No red flag signs. CTH without heme. Likely iso of hyponatremia. No other neurologic symptoms other than headache.    - treat underlying hyponatremia    Hypertension.   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 Home meds atenolol 50, valsartan 80 qD, nifedipine 30 qD, spironolactone 50, hctz 12.5. Unknown if worked up for resistant hypertension. Consider uptitrating valsartan given holding other home meds. Spoke to Dr. Guthrie who agreed to holding all antihypertensives and he will see the patient on Thursday for adjustment of medication regimen.     - hold home anti-hypertensives     Hyperlipidemia.   Home med atorvastatin 40    - c/w home med.    Transaminitis.   Mildly elevated and lateral since 5/23  RESOLVED.     Physical exam at discharge:  General: A&Ox3; NAD  Head: NC/AT; MMM; PERRL; EOMI; no APD   Neck: Supple; no JVD  Back: kyphosis   Respiratory: CTA B/L; no wheezes/crackles   Cardiovascular: Regular rhythm/rate; S1/S2   Gastrointestinal: Soft; NTND; normoactive BS  Extremities: WWP; no edema/cyanosis  Neurological:  CNII-XII grossly intact; no obvious focal deficits    New medications on discharge: none   Labs to be followed up: none   Imaging to be done as outpatient: none   Further outpatient workup: Dr. Guthrie PCP on 12/5   Hospital course:  This is a 89 year old female with a past medical history of hypertension and hyperlipidemia who is presenting for a headache found to have hyponatremia likely iso of HCTZ. During her admission, all anti-hypertensive were held. Hyponatremia was likely due to hypovolemic hyponatremia due to poor PO intake vs over diuresis, sodium was monitored and corrected after NS administration and encouragement of PO intake.     Hyponatremia.   Symptomatic with headache, with previous Na 130s. Could be 2/2 to HCTZ use, [facility]. On admission Na 125 and uptrending to 127, received 500 ns bolus in ED.     - hold HCTZ.    Hyperkalemia.   Now normalized, could be i/s/o spironolactone. Asx no EKG changes.    - hold spironolactone.    Headache, unspecified.   No red flag signs. CTH without heme. Likely iso of hyponatremia. No other neurologic symptoms other than headache.    - treat underlying hyponatremia    Hypertension.   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 Home meds atenolol 50, valsartan 80 qD, nifedipine 30 qD, spironolactone 50, hctz 12.5. Unknown if worked up for resistant hypertension. Consider uptitrating valsartan given holding other home meds. Spoke to Dr. Guthrie who agreed to holding all antihypertensives and he will see the patient on Thursday for adjustment of medication regimen.     - hold home anti-hypertensives  - fluid restrict 2L per day    Hyperlipidemia.   Home med atorvastatin 40    - c/w home med.    Transaminitis.   Mildly elevated and lateral since 5/23  RESOLVED.     Physical exam at discharge:  General: A&Ox3; NAD  Head: NC/AT; MMM; PERRL; EOMI; no APD   Neck: Supple; no JVD  Back: kyphosis   Respiratory: CTA B/L; no wheezes/crackles   Cardiovascular: Regular rhythm/rate; S1/S2   Gastrointestinal: Soft; NTND; normoactive BS  Extremities: WWP; no edema/cyanosis  Neurological:  CNII-XII grossly intact; no obvious focal deficits    New medications on discharge: none   Labs to be followed up: none   Imaging to be done as outpatient: none   Further outpatient workup: Dr. Guthrie PCP on 12/5   Hospital course:  This is a 89 year old female with a past medical history of hypertension and hyperlipidemia who is presenting for a headache found to have hyponatremia likely iso of HCTZ. During her admission, all anti-hypertensive were held. Hyponatremia was likely due to hypovolemic hyponatremia due to poor PO intake vs over diuresis, sodium was monitored and corrected after NS administration and encouragement of PO intake.     Hyponatremia.   Symptomatic with headache, with previous Na 130s. Could be 2/2 to HCTZ use, [facility] On admission Na 125 and uptrending to 127, received 500 ns bolus in ED.   - hold HCTZ.    Hyperkalemia.   Now normalized, could be i/s/o spironolactone. Asx no EKG changes.  - hold spironolactone.    Headache, unspecified.   No red flag signs. CTH without heme. Likely iso of hyponatremia. No other neurologic symptoms other than headache.  - treat underlying hyponatremia    Hypertension.   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 Home meds atenolol 50, valsartan 80 qD, nifedipine 30 qD, spironolactone 50, hctz 12.5. Unknown if worked up for resistant hypertension. Consider uptitrating valsartan given holding other home meds. Spoke to Dr. Guthrie who agreed to holding all antihypertensives and he will see the patient on Thursday for adjustment of medication regimen.   - hold home anti-hypertensives  - fluid restrict 2L per day    Hyperlipidemia.   Home med atorvastatin 40. Continue    Transaminitis.   Mildly elevated and lateral since 5/23  RESOLVED.     Physical exam at discharge:  General: A&Ox3; NAD  Head: NC/AT; MMM; PERRL; EOMI; no APD   Neck: Supple; no JVD  Back: kyphosis   Respiratory: CTA B/L; no wheezes/crackles   Cardiovascular: Regular rhythm/rate; S1/S2   Gastrointestinal: Soft; NTND; normoactive BS  Extremities: WWP; no edema/cyanosis  Neurological:  CNII-XII grossly intact; no obvious focal deficits    New medications on discharge: none   Labs to be followed up: none   Imaging to be done as outpatient: none   Further outpatient workup: Dr. Guthrie PCP on 12/5    Attending Attestation  89F w HTN, HLD p/w frontal headache, found to have hyponatremia and hyperkalemia, likely d/t HCTZ use and poor PO intake around Thanksgiving, BP meds held and Na improved w increase intake and fluid restriction, for DC Home    Pt states headache resolved. Eating wo N/V/abd pain. Eager to return home. Denies LH/dizziness.   Exam: female in NAD on RA, MMM, RRR, nml resp effort, CTAB, abd soft, NABS, non-tender, alert, moving all ext, no BLE Edema.    #Hyponatremia - Na 132 today. Presented w Na 125   Aleksey 23 suggesting hypovolemia  #Hyperkalemia - was 3.8 today. 4.4 from 5.7 on admission    #HTN - BP ranging 130-140s on day of DC. home on atenolol 50, HCTZ 12.5, Nifedipine 30, Aldactone 50, Valsartan 80 -- these have all been held  #Headache - CT Head wo acute processes. Headache also possibly d/t hyponatreima    Recommend for outpatient - pt to f/u w PCP tomorrow and to re-evaluate BP. Recommend rechecking BP  Above d/w housestaff

## 2024-12-03 NOTE — DISCHARGE NOTE PROVIDER - NSDCMRMEDTOKEN_GEN_ALL_CORE_FT
aspirin 81 mg oral delayed release tablet: 1 tab(s) orally once a day  atenolol 50 mg oral tablet: 1 tab(s) orally once a day  atorvastatin 40 mg oral tablet: 1 tab(s) orally once a day (at bedtime)  NIFEdipine 30 mg oral tablet, extended release: 1 tab(s) orally once a day  spironolactone 50 mg oral tablet: 1 tab(s) orally once a day  valsartan 80 mg oral capsule: orally once a day   aspirin 81 mg oral delayed release tablet: 1 tab(s) orally once a day  atorvastatin 40 mg oral tablet: 1 tab(s) orally once a day (at bedtime)

## 2024-12-03 NOTE — DISCHARGE NOTE PROVIDER - PROVIDER TOKENS
PROVIDER:[TOKEN:[[id number]],FOLLOWUP:[1 week]] PROVIDER:[TOKEN:[[id number]],SCHEDULEDAPPT:[12/05/2024],SCHEDULEDAPPTTIME:[10:30 AM],ESTABLISHEDPATIENT:[T]]

## 2024-12-03 NOTE — DISCHARGE NOTE PROVIDER - NSDCFUSCHEDAPPT_GEN_ALL_CORE_FT
Won Ospina  [facility]  [address] [address]  Scheduled Appointment: 02/06/2025

## 2024-12-04 ENCOUNTER — TRANSCRIPTION ENCOUNTER (OUTPATIENT)
Age: 88
End: 2024-12-04

## 2024-12-04 VITALS
RESPIRATION RATE: 20 BRPM | HEART RATE: 88 BPM | SYSTOLIC BLOOD PRESSURE: 141 MMHG | TEMPERATURE: 98 F | OXYGEN SATURATION: 97 % | DIASTOLIC BLOOD PRESSURE: 61 MMHG

## 2024-12-04 LAB
ALBUMIN SERPL ELPH-MCNC: 4.1 G/DL — SIGNIFICANT CHANGE UP (ref 3.3–5)
ALP SERPL-CCNC: 78 U/L — SIGNIFICANT CHANGE UP (ref 40–120)
ALT FLD-CCNC: 32 U/L — SIGNIFICANT CHANGE UP (ref 10–45)
ANION GAP SERPL CALC-SCNC: 11 MMOL/L — SIGNIFICANT CHANGE UP (ref 5–17)
ANION GAP SERPL CALC-SCNC: 9 MMOL/L — SIGNIFICANT CHANGE UP (ref 5–17)
AST SERPL-CCNC: 34 U/L — SIGNIFICANT CHANGE UP (ref 10–40)
BASOPHILS # BLD AUTO: 0.03 K/UL — SIGNIFICANT CHANGE UP (ref 0–0.2)
BASOPHILS NFR BLD AUTO: 0.8 % — SIGNIFICANT CHANGE UP (ref 0–2)
BILIRUB SERPL-MCNC: 0.2 MG/DL — SIGNIFICANT CHANGE UP (ref 0.2–1.2)
BUN SERPL-MCNC: 12 MG/DL — SIGNIFICANT CHANGE UP (ref 7–23)
BUN SERPL-MCNC: 12 MG/DL — SIGNIFICANT CHANGE UP (ref 7–23)
CALCIUM SERPL-MCNC: 9 MG/DL — SIGNIFICANT CHANGE UP (ref 8.4–10.5)
CALCIUM SERPL-MCNC: 9.5 MG/DL — SIGNIFICANT CHANGE UP (ref 8.4–10.5)
CHLORIDE SERPL-SCNC: 95 MMOL/L — LOW (ref 96–108)
CHLORIDE SERPL-SCNC: 96 MMOL/L — SIGNIFICANT CHANGE UP (ref 96–108)
CO2 SERPL-SCNC: 23 MMOL/L — SIGNIFICANT CHANGE UP (ref 22–31)
CO2 SERPL-SCNC: 26 MMOL/L — SIGNIFICANT CHANGE UP (ref 22–31)
CORTIS AM PEAK SERPL-MCNC: 15 UG/DL — SIGNIFICANT CHANGE UP (ref 6.02–18.4)
CREAT SERPL-MCNC: 0.52 MG/DL — SIGNIFICANT CHANGE UP (ref 0.5–1.3)
CREAT SERPL-MCNC: 0.54 MG/DL — SIGNIFICANT CHANGE UP (ref 0.5–1.3)
EGFR: 88 ML/MIN/1.73M2 — SIGNIFICANT CHANGE UP
EGFR: 89 ML/MIN/1.73M2 — SIGNIFICANT CHANGE UP
EOSINOPHIL # BLD AUTO: 0.12 K/UL — SIGNIFICANT CHANGE UP (ref 0–0.5)
EOSINOPHIL NFR BLD AUTO: 3.2 % — SIGNIFICANT CHANGE UP (ref 0–6)
GLUCOSE SERPL-MCNC: 128 MG/DL — HIGH (ref 70–99)
GLUCOSE SERPL-MCNC: 92 MG/DL — SIGNIFICANT CHANGE UP (ref 70–99)
HCT VFR BLD CALC: 35.3 % — SIGNIFICANT CHANGE UP (ref 34.5–45)
HGB BLD-MCNC: 11.9 G/DL — SIGNIFICANT CHANGE UP (ref 11.5–15.5)
IMM GRANULOCYTES NFR BLD AUTO: 0.5 % — SIGNIFICANT CHANGE UP (ref 0–0.9)
LYMPHOCYTES # BLD AUTO: 1.24 K/UL — SIGNIFICANT CHANGE UP (ref 1–3.3)
LYMPHOCYTES # BLD AUTO: 32.6 % — SIGNIFICANT CHANGE UP (ref 13–44)
MAGNESIUM SERPL-MCNC: 1.8 MG/DL — SIGNIFICANT CHANGE UP (ref 1.6–2.6)
MCHC RBC-ENTMCNC: 30.1 PG — SIGNIFICANT CHANGE UP (ref 27–34)
MCHC RBC-ENTMCNC: 33.7 G/DL — SIGNIFICANT CHANGE UP (ref 32–36)
MCV RBC AUTO: 89.1 FL — SIGNIFICANT CHANGE UP (ref 80–100)
MONOCYTES # BLD AUTO: 0.42 K/UL — SIGNIFICANT CHANGE UP (ref 0–0.9)
MONOCYTES NFR BLD AUTO: 11.1 % — SIGNIFICANT CHANGE UP (ref 2–14)
NEUTROPHILS # BLD AUTO: 1.97 K/UL — SIGNIFICANT CHANGE UP (ref 1.8–7.4)
NEUTROPHILS NFR BLD AUTO: 51.8 % — SIGNIFICANT CHANGE UP (ref 43–77)
NRBC # BLD: 0 /100 WBCS — SIGNIFICANT CHANGE UP (ref 0–0)
PHOSPHATE SERPL-MCNC: 3 MG/DL — SIGNIFICANT CHANGE UP (ref 2.5–4.5)
PLATELET # BLD AUTO: 204 K/UL — SIGNIFICANT CHANGE UP (ref 150–400)
POTASSIUM SERPL-MCNC: 3.8 MMOL/L — SIGNIFICANT CHANGE UP (ref 3.5–5.3)
POTASSIUM SERPL-MCNC: 4.2 MMOL/L — SIGNIFICANT CHANGE UP (ref 3.5–5.3)
POTASSIUM SERPL-SCNC: 3.8 MMOL/L — SIGNIFICANT CHANGE UP (ref 3.5–5.3)
POTASSIUM SERPL-SCNC: 4.2 MMOL/L — SIGNIFICANT CHANGE UP (ref 3.5–5.3)
PROT SERPL-MCNC: 6.2 G/DL — SIGNIFICANT CHANGE UP (ref 6–8.3)
RBC # BLD: 3.96 M/UL — SIGNIFICANT CHANGE UP (ref 3.8–5.2)
RBC # FLD: 14.9 % — HIGH (ref 10.3–14.5)
SODIUM SERPL-SCNC: 128 MMOL/L — LOW (ref 135–145)
SODIUM SERPL-SCNC: 132 MMOL/L — LOW (ref 135–145)
WBC # BLD: 3.8 K/UL — SIGNIFICANT CHANGE UP (ref 3.8–10.5)
WBC # FLD AUTO: 3.8 K/UL — SIGNIFICANT CHANGE UP (ref 3.8–10.5)

## 2024-12-04 PROCEDURE — 84100 ASSAY OF PHOSPHORUS: CPT

## 2024-12-04 PROCEDURE — 70450 CT HEAD/BRAIN W/O DYE: CPT | Mod: MC

## 2024-12-04 PROCEDURE — 86850 RBC ANTIBODY SCREEN: CPT

## 2024-12-04 PROCEDURE — 80053 COMPREHEN METABOLIC PANEL: CPT

## 2024-12-04 PROCEDURE — 82570 ASSAY OF URINE CREATININE: CPT

## 2024-12-04 PROCEDURE — 86901 BLOOD TYPING SEROLOGIC RH(D): CPT

## 2024-12-04 PROCEDURE — G0378: CPT

## 2024-12-04 PROCEDURE — 83930 ASSAY OF BLOOD OSMOLALITY: CPT

## 2024-12-04 PROCEDURE — 93005 ELECTROCARDIOGRAM TRACING: CPT

## 2024-12-04 PROCEDURE — 84443 ASSAY THYROID STIM HORMONE: CPT

## 2024-12-04 PROCEDURE — 83935 ASSAY OF URINE OSMOLALITY: CPT

## 2024-12-04 PROCEDURE — 85025 COMPLETE CBC W/AUTO DIFF WBC: CPT

## 2024-12-04 PROCEDURE — 36415 COLL VENOUS BLD VENIPUNCTURE: CPT

## 2024-12-04 PROCEDURE — 80048 BASIC METABOLIC PNL TOTAL CA: CPT

## 2024-12-04 PROCEDURE — 86900 BLOOD TYPING SEROLOGIC ABO: CPT

## 2024-12-04 PROCEDURE — 82533 TOTAL CORTISOL: CPT

## 2024-12-04 PROCEDURE — 99239 HOSP IP/OBS DSCHRG MGMT >30: CPT

## 2024-12-04 PROCEDURE — 84540 ASSAY OF URINE/UREA-N: CPT

## 2024-12-04 PROCEDURE — 84300 ASSAY OF URINE SODIUM: CPT

## 2024-12-04 PROCEDURE — 83735 ASSAY OF MAGNESIUM: CPT

## 2024-12-04 PROCEDURE — 84156 ASSAY OF PROTEIN URINE: CPT

## 2024-12-04 PROCEDURE — 99285 EMERGENCY DEPT VISIT HI MDM: CPT

## 2024-12-04 PROCEDURE — 84133 ASSAY OF URINE POTASSIUM: CPT

## 2024-12-04 PROCEDURE — 81001 URINALYSIS AUTO W/SCOPE: CPT

## 2024-12-04 RX ADMIN — Medication 25 GRAM(S): at 11:01

## 2024-12-04 RX ADMIN — Medication 81 MILLIGRAM(S): at 11:49

## 2024-12-04 RX ADMIN — ENOXAPARIN SODIUM 40 MILLIGRAM(S): 30 INJECTION SUBCUTANEOUS at 10:29

## 2024-12-04 NOTE — DISCHARGE NOTE NURSING/CASE MANAGEMENT/SOCIAL WORK - FINANCIAL ASSISTANCE
[facility] provides services at a reduced cost to those who are determined to be eligible through [facility]’s financial assistance program. Information regarding [facility]’s financial assistance program can be found by going to [email or web address] or by calling [phone].

## 2024-12-04 NOTE — PROGRESS NOTE ADULT - PROBLEM SELECTOR PLAN 2
Now normalized, could be i/s/o spironolactone. Asx no EKG changes.    - hold spironolactone

## 2024-12-04 NOTE — DISCHARGE NOTE NURSING/CASE MANAGEMENT/SOCIAL WORK - PATIENT PORTAL LINK FT
You can access the FollowMyHealth Patient Portal offered by [facility] by registering at the following website: [email or web address]. By joining [email or web address]’s FollowMyHealth portal, you will also be able to view your health information using other applications (apps) compatible with our system.

## 2024-12-04 NOTE — DISCHARGE NOTE NURSING/CASE MANAGEMENT/SOCIAL WORK - NSDCVIVACCINE_GEN_ALL_CORE_FT
Tdap; 19-Feb-2019 13:55; Madie Del Cid (RADHA); [employer]; [id number] (Exp. Date: 11-Feb-2020); IntraMuscular; Deltoid Right.; 0.5 milliLiter(s); VIS (VIS Published: 09-May-2013, VIS Presented: 19-Feb-2019);

## 2024-12-04 NOTE — PROGRESS NOTE ADULT - PROBLEM SELECTOR PLAN 5
Home med atorvastatin 40    - c/w home med

## 2024-12-04 NOTE — PROGRESS NOTE ADULT - PROBLEM SELECTOR PLAN 1
Symptomatic with headache, with previous na 130s. Could be 2/2 to HCTZ use, first prescription in [facility] from late 2023. On admission Na 125 and uptrending to 127, received 500 ns bolus in ED. Denies decreased po intake.    Plan:   - check urine studies and serum osm  - hold HCTZ
Symptomatic with headache, with previous na 130s. Could be 2/2 to HCTZ use, first prescription in [facility] from late 2023. On admission Na 125 and uptrending to 127, received 500 ns bolus in ED. Denies decreased po intake.    Plan:   - check urine studies and serum osm  - hold HCTZ
Symptomatic with headache, with previous na 130s. Could be 2/2 to HCTZ use, first prescription in [facility] from late 2023. On admission Na 125 and uptrending to 127, received 500 ns bolus in ED. Denies decreased po intake.    Plan:   - check urine studies and serum osm  - hold HCTZ

## 2024-12-04 NOTE — PROGRESS NOTE ADULT - SUBJECTIVE AND OBJECTIVE BOX
O/N Events:  Subjective/ROS: Denies HA, CP, SOB, n/v, changes in bowel/urinary habits.  12pt ROS otherwise negative.    VITALS  Vital Signs Last 12 Hrs  T(F): 98.3 (12-04-24 @ 06:34), Max: 98.3 (12-04-24 @ 06:34)  HR: 89 (12-04-24 @ 06:34) (83 - 89)  BP: 167/87 (12-04-24 @ 06:34) (150/72 - 167/87)  BP(mean): --  RR: 18 (12-04-24 @ 06:34) (18 - 18)  SpO2: 97% (12-04-24 @ 06:34) (96% - 97%)  I&O's Summary    03 Dec 2024 07:01  -  04 Dec 2024 06:55  --------------------------------------------------------  IN: 0 mL / OUT: 200 mL / NET: -200 mL        PHYSICAL EXAM  General: A&Ox3; NAD  Head: NC/AT; MMM; PERRL; EOMI;  Neck: Supple; no JVD  Respiratory: CTA B/L; no wheezes/crackles   Cardiovascular: Regular rhythm/rate; S1/S2   Gastrointestinal: Soft; NTND; normoactive BS  Extremities: WWP; no edema/cyanosis  Neurological:  CNII-XII grossly intact; no obvious focal deficits    MEDICATIONS  (STANDING):  aspirin enteric coated 81 milliGRAM(s) Oral daily  atorvastatin 40 milliGRAM(s) Oral at bedtime  enoxaparin Injectable 40 milliGRAM(s) SubCutaneous every 24 hours    MEDICATIONS  (PRN):  acetaminophen     Tablet .. 650 milliGRAM(s) Oral every 6 hours PRN Temp greater or equal to 38C (100.4F), Mild Pain (1 - 3)  melatonin 3 milliGRAM(s) Oral at bedtime PRN Insomnia      No Known Allergies      LABS                        12.4   3.50  )-----------( 207      ( 03 Dec 2024 05:30 )             37.0     12-03    125[L]  |  93[L]  |  14  ----------------------------<  111[H]  4.5   |  25  |  0.73    Ca    8.9      03 Dec 2024 16:59  Phos  3.2     12-03  Mg     1.9     12-03    TPro  6.6  /  Alb  4.3  /  TBili  0.2  /  DBili  x   /  AST  31  /  ALT  33  /  AlkPhos  84  12-03      Urinalysis Basic - ( 03 Dec 2024 16:59 )    Color: x / Appearance: x / SG: x / pH: x  Gluc: 111 mg/dL / Ketone: x  / Bili: x / Urobili: x   Blood: x / Protein: x / Nitrite: x   Leuk Esterase: x / RBC: x / WBC x   Sq Epi: x / Non Sq Epi: x / Bacteria: x            IMAGING/EKG/ETC  EKG:  Xray:  CT:  MRI:

## 2024-12-04 NOTE — PROGRESS NOTE ADULT - PROBLEM SELECTOR PLAN 4
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 Home meds atenolol 50, valsartan 80 qD, nifedipine 30 qD, spironolactone 50, hctz 12.5. Unknown if worked up for resistant hypertension. Consider uptitrating valsartan given holding other home meds.   12/2 Spoke to Dr. Guthrie who agreed to holding all antihypertensives and he will see the patient on Thursday for adjustment of medication regimen. Clinic BPs 170s/80s, Na >130.     Plan:   - hold home anti-hypertensives

## 2024-12-04 NOTE — PROGRESS NOTE ADULT - ASSESSMENT
This is a 89 year old female with a past medical history of hypertension and hyperlipidemia who is presenting for a headache found to have hyponatremia likely iso of HCTZ. 

## 2024-12-10 DIAGNOSIS — R74.01 ELEVATION OF LEVELS OF LIVER TRANSAMINASE LEVELS: ICD-10-CM

## 2024-12-10 DIAGNOSIS — E87.5 HYPERKALEMIA: ICD-10-CM

## 2024-12-10 DIAGNOSIS — E87.1 HYPO-OSMOLALITY AND HYPONATREMIA: ICD-10-CM

## 2024-12-10 DIAGNOSIS — E86.1 HYPOVOLEMIA: ICD-10-CM

## 2024-12-10 DIAGNOSIS — Z79.82 LONG TERM (CURRENT) USE OF ASPIRIN: ICD-10-CM

## 2024-12-10 DIAGNOSIS — I10 ESSENTIAL (PRIMARY) HYPERTENSION: ICD-10-CM

## 2024-12-10 DIAGNOSIS — E78.5 HYPERLIPIDEMIA, UNSPECIFIED: ICD-10-CM

## 2024-12-10 DIAGNOSIS — R51.9 HEADACHE, UNSPECIFIED: ICD-10-CM

## 2024-12-10 DIAGNOSIS — T50.2X5A ADVERSE EFFECT OF CARBONIC-ANHYDRASE INHIBITORS, BENZOTHIADIAZIDES AND OTHER DIURETICS, INITIAL ENCOUNTER: ICD-10-CM

## 2024-12-23 ENCOUNTER — EMERGENCY (EMERGENCY)
Facility: HOSPITAL | Age: 88
LOS: 1 days | Discharge: HOME | End: 2024-12-23
Attending: EMERGENCY MEDICINE | Admitting: EMERGENCY MEDICINE
Payer: MEDICARE

## 2024-12-23 VITALS
SYSTOLIC BLOOD PRESSURE: 185 MMHG | OXYGEN SATURATION: 96 % | RESPIRATION RATE: 18 BRPM | TEMPERATURE: 98 F | HEART RATE: 80 BPM | DIASTOLIC BLOOD PRESSURE: 102 MMHG

## 2024-12-23 VITALS
HEIGHT: 62 IN | WEIGHT: 104.94 LBS | DIASTOLIC BLOOD PRESSURE: 106 MMHG | SYSTOLIC BLOOD PRESSURE: 199 MMHG | OXYGEN SATURATION: 95 % | TEMPERATURE: 98 F | HEART RATE: 78 BPM | RESPIRATION RATE: 18 BRPM

## 2024-12-23 DIAGNOSIS — N93.9 ABNORMAL UTERINE AND VAGINAL BLEEDING, UNSPECIFIED: ICD-10-CM

## 2024-12-23 DIAGNOSIS — I10 ESSENTIAL (PRIMARY) HYPERTENSION: ICD-10-CM

## 2024-12-23 LAB
ANION GAP SERPL CALC-SCNC: 11 MMOL/L — SIGNIFICANT CHANGE UP (ref 5–17)
BUN SERPL-MCNC: 12 MG/DL — SIGNIFICANT CHANGE UP (ref 7–23)
CALCIUM SERPL-MCNC: 9.5 MG/DL — SIGNIFICANT CHANGE UP (ref 8.4–10.5)
CHLORIDE SERPL-SCNC: 99 MMOL/L — SIGNIFICANT CHANGE UP (ref 96–108)
CO2 SERPL-SCNC: 22 MMOL/L — SIGNIFICANT CHANGE UP (ref 22–31)
CREAT SERPL-MCNC: 0.51 MG/DL — SIGNIFICANT CHANGE UP (ref 0.5–1.3)
EGFR: 89 ML/MIN/1.73M2 — SIGNIFICANT CHANGE UP
GLUCOSE SERPL-MCNC: 109 MG/DL — HIGH (ref 70–99)
HCT VFR BLD CALC: 39.1 % — SIGNIFICANT CHANGE UP (ref 34.5–45)
HGB BLD-MCNC: 12.7 G/DL — SIGNIFICANT CHANGE UP (ref 11.5–15.5)
MCHC RBC-ENTMCNC: 29.4 PG — SIGNIFICANT CHANGE UP (ref 27–34)
MCHC RBC-ENTMCNC: 32.5 G/DL — SIGNIFICANT CHANGE UP (ref 32–36)
MCV RBC AUTO: 90.5 FL — SIGNIFICANT CHANGE UP (ref 80–100)
NRBC # BLD: 0 /100 WBCS — SIGNIFICANT CHANGE UP (ref 0–0)
PLATELET # BLD AUTO: 309 K/UL — SIGNIFICANT CHANGE UP (ref 150–400)
POTASSIUM SERPL-MCNC: 4.2 MMOL/L — SIGNIFICANT CHANGE UP (ref 3.5–5.3)
POTASSIUM SERPL-SCNC: 4.2 MMOL/L — SIGNIFICANT CHANGE UP (ref 3.5–5.3)
RBC # BLD: 4.32 M/UL — SIGNIFICANT CHANGE UP (ref 3.8–5.2)
RBC # FLD: 14.3 % — SIGNIFICANT CHANGE UP (ref 10.3–14.5)
SODIUM SERPL-SCNC: 132 MMOL/L — LOW (ref 135–145)
WBC # BLD: 3.18 K/UL — LOW (ref 3.8–10.5)
WBC # FLD AUTO: 3.18 K/UL — LOW (ref 3.8–10.5)

## 2024-12-23 PROCEDURE — 80048 BASIC METABOLIC PNL TOTAL CA: CPT

## 2024-12-23 PROCEDURE — 99284 EMERGENCY DEPT VISIT MOD MDM: CPT

## 2024-12-23 PROCEDURE — 85027 COMPLETE CBC AUTOMATED: CPT

## 2024-12-23 PROCEDURE — 99283 EMERGENCY DEPT VISIT LOW MDM: CPT

## 2024-12-23 PROCEDURE — 36415 COLL VENOUS BLD VENIPUNCTURE: CPT

## 2024-12-23 NOTE — ED PROVIDER NOTE - NSFOLLOWUPINSTRUCTIONS_ED_ALL_ED_FT
FOLLOW UP WITH YOUR GYNECOLOGIST IN 1 WEEK.    Abnormal Uterine Bleeding  A female body showing the reproductive organs, with a close-up view of the uterus and vagina.  Abnormal uterine bleeding is unusual bleeding from the uterus. It includes bleeding after sex, or bleeding or spotting between menstrual periods. It may also include bleeding that is heavier than normal, menstrual periods that last longer than usual, or bleeding that occurs after menopause.    Abnormal uterine bleeding can affect teenagers, women in their reproductive years, pregnant women, and women who have reached menopause. Common causes of abnormal uterine bleeding include:  Pregnancy.  Abnormal growths within the lining of the uterus (polyps).  Benign tumors or growths in the uterus (fibroids). These are not cancer.  Infection.  Cancer.  Too much or too little of some hormones in the body (hormonal imbalances).  Any type of abnormal bleeding should be checked by a health care provider. Many cases are minor and simple to treat, but others may be more serious. Treatment will depend on the cause of the bleeding and how severe it is.    Follow these instructions at home:  Medicines    Take over-the-counter and prescription medicines only as told by your health care provider.  Ask your health care provider about:  Taking medicines such as aspirin and ibuprofen. These medicines can thin your blood. Do not take these medicines unless your health care provider tells you to take them.  Taking over-the-counter medicines, vitamins, herbs, and supplements.  If you were prescribed iron pills, take them as told by your health care provider. Iron pills help to replace iron that your body loses because of this condition.  Managing constipation    In cases of severe bleeding, you may be asked to increase your iron intake to treat anemia. Doing this may cause constipation. To prevent or treat constipation, you may need to:  Drink enough fluid to keep your urine pale yellow.  Take over-the-counter or prescription medicines.  Eat foods that are high in fiber, such as beans, whole grains, and fresh fruits and vegetables.  Limit foods that are high in fat and processed sugars, such as fried or sweet foods.  Activity    Alter your activity to decrease bleeding if you need to change your sanitary pad more than one time every 2 hours:  Lie in bed with your feet raised (elevated).  Place a cold pack on your lower abdomen.  Rest as much as possible until the bleeding stops or slows down.  General instructions    Do not use tampons, douche, or have sex until your health care provider says these things are okay.  Change your sanitary pads often.  Get regular exams. These include pelvic exams and cervical cancer screenings.  It is up to you to get the results of any tests that are done. Ask your health care provider, or the department that is doing the tests, when your results will be ready.  Monitor your condition for any changes. For 2 months, write down:  When your menstrual period starts.  When your menstrual period ends.  When any abnormal vaginal bleeding occurs.  What problems you notice.  Keep all follow-up visits. This is important.  Contact a health care provider if:  You have bleeding that lasts for more than one week.  You feel dizzy at times.  You feel nauseous or you vomit.  You feel light-headed or weak.  You notice any other changes that show that your condition is getting worse.  Get help right away if:  You faint.  You have bleeding that soaks through a sanitary pad every hour.  You have pain in the abdomen.  You have a fever or chills.  You become sweaty or weak.  You pass large blood clots from your vagina.  These symptoms may represent a serious problem that is an emergency. Do not wait to see if the symptoms will go away. Get medical help right away. Call your local emergency services (911 in the [place] Do not drive yourself to the hospital.    Summary  Abnormal uterine bleeding is unusual bleeding from the uterus.  Any type of abnormal bleeding should be checked by a health care provider. Many cases are minor and simple to treat, but others may be more serious.  Treatment will depend on the cause of the bleeding and how severe it is.  Get help right away if you faint, you have bleeding that soaks through a sanitary pad every hour, or you pass large blood clots from your vagina.  This information is not intended to replace advice given to you by your health care provider. Make sure you discuss any questions you have with your health care provider.    Document Revised: 07/22/2024 Document Reviewed: 04/19/2022  Elsevier Patient Education © 2024 [employer]

## 2024-12-23 NOTE — ED ADULT NURSE NOTE - NSFALLCONCLUSION_ED_ALL_ED
Progress Note - Behavioral Health   Arthea Angelucci 47 y o  male MRN: [id number]  Unit/Bed#: [facility]85 Encounter: [id number]    Assessment/Plan   Principal Problem:    Severe episode of recurrent major depressive disorder, without psychotic features ([address] )      Subjective: Patient is compliant with medications with no acute side effects  Patient continues to believe that he lost many family member, but after collaboration we found out that these are patient's fixed and falls believes  Patient continues to express depression secondary to losing his multiple family members with me  Patient has agreed with plan of considering risperidone  Patient did remained child-like with poor insight into his thought process at this time  He is consenting for safety the unit  Patient again encouraged to participate in milieu therapy      Current Medications:    Current Facility-Administered Medications:  acetaminophen 650 mg Oral Q6H PRN [facility], PA-C   aluminum-magnesium hydroxide-simethicone 30 mL Oral Q4H PRN [facility], PA-C   benztropine 1 mg Intramuscular Q6H PRN [facility], PA-C   benztropine 1 mg Oral Q6H PRN [facility], PA-C   carbamide peroxide 5 drop Both Ears BID Tiffani Mckenzie, PA-C   escitalopram 10 mg Oral Daily Shane Gipson   haloperidol 5 mg Oral Q6H PRN [facility], PA-C   haloperidol lactate 5 mg Intramuscular Q6H PRN [facility], PA-C   LORazepam 2 mg Intramuscular Q6H PRN [facility], PA-C   LORazepam 1 mg Oral Q6H PRN [facility], PA-C   magnesium hydroxide 30 mL Oral Daily PRN [facility], PA-C   OLANZapine 10 mg Intramuscular Q3H PRN [facility], PA-C   OLANZapine 10 mg Oral Q3H PRN [facility], PA-C   risperiDONE 1 mg Oral Q3H PRN [facility], PA-C   risperiDONE 2 mg Oral HS [facility]   traZODone 50 mg Oral HS PRN [facility], PA-C       Behavioral Health Medications: all current active meds have been reviewed  Vital signs in last 24 hours:  Temp:  [98 °F (36 7 °C)-98 1 °F (36 7 °C)] 98 °F (36 7 °C)  HR:  [56-61] 56  Resp:  [18] 18  BP: (128-130)/(78-81) 130/81    Laboratory results:    I have personally reviewed all pertinent laboratory/tests results    Labs in last 72 hours:   Recent Labs      06/21/18   0540   WBC  5 62   RBC  6 04*   HGB  15 8   HCT  47 7   PLT  201   RDW  13 9   NEUTROABS  2 45   NA  139   K  4 0   CL  103   CO2  28   BUN  11   CREATININE  1 13   GLUCOSE  93   CALCIUM  8 6   AST  60*   ALT  100*   ALKPHOS  69   PROT  7 5   BILITOT  0 50   CHOL  171   HDL  72*   TRIG  74   LDLCALC  84   [id number]  1 328   RPR  Non-Reactive     Admission Labs:   Admission on 06/20/2018   Component Date Value    Color, UA 06/20/2018 Yellow     Clarity, UA 06/20/2018 Clear     Specific Gravity, UA 06/20/2018 1 015     pH, UA 06/20/2018 7 0     Leukocytes, UA 06/20/2018 Negative     Nitrite, UA 06/20/2018 Negative     Protein, UA 06/20/2018 Negative     Glucose, UA 06/20/2018 Negative     Ketones, UA 06/20/2018 Negative     Urobilinogen, UA 06/20/2018 2 0*    Bilirubin, UA 06/20/2018 Negative     Blood, UA 06/20/2018 Negative     WBC 06/21/2018 5 62     RBC 06/21/2018 6 04*    Hemoglobin 06/21/2018 15 8     Hematocrit 06/21/2018 47 7     MCV 06/21/2018 79*    MCH 06/21/2018 26 2*    MCHC 06/21/2018 33 1     RDW 06/21/2018 13 9     MPV 06/21/2018 10 5     Platelets 12/91/4795 201     nRBC 06/21/2018 0     Neutrophils Relative 06/21/2018 43     Immat GRANS % 06/21/2018 1     Lymphocytes Relative 06/21/2018 32     Monocytes Relative 06/21/2018 16*    Eosinophils Relative 06/21/2018 7*    Basophils Relative 06/21/2018 1     Neutrophils Absolute 06/21/2018 2 45     Immature Grans Absolute 06/21/2018 0 03     Lymphocytes Absolute 06/21/2018 1 82     Monocytes Absolute 06/21/2018 0 88     Eosinophils Absolute 06/21/2018 0 37     Basophils Absolute 06/21/2018 0 07     Sodium 06/21/2018 139     Potassium 06/21/2018 4 0     Chloride 06/21/2018 103     CO2 06/21/2018 28     Anion Gap 06/21/2018 8     BUN 06/21/2018 11     Creatinine 06/21/2018 1 13     Glucose 06/21/2018 93     Calcium 06/21/2018 8 6     AST 06/21/2018 60*    ALT 06/21/2018 100*    Alkaline Phosphatase 06/21/2018 69     Total Protein 06/21/2018 7 5     Albumin 06/21/2018 3 2*    Total Bilirubin 06/21/2018 0 50     eGFR 06/21/2018 85     Cholesterol 06/21/2018 171     Triglycerides 06/21/2018 74     HDL, Direct 06/21/2018 72*    LDL Calculated 06/21/2018 84     Non-HDL-Chol (CHOL-HDL) 06/21/2018 99     RPR 06/21/2018 Non-Reactive     Hemoglobin A1C 06/21/2018 6 0     EAG 06/21/2018 126     TSH 3RD GENERATON 06/21/2018 1 328        Psychiatric Review of Systems:  Behavior over the last 24 hours:  unchanged  Sleep: normal  Appetite: normal  Medication side effects: No  ROS: no complaints    Mental Status Evaluation:  Appearance:  casually dressed   Behavior:  guarded   Speech:  soft   Mood:  anxious and depressed   Affect:  increased in range   Language naming objects and repeating phrases   Thought Process:  circumstantial   Thought Content:  delusions  persecutory   Perceptual Disturbances: None   Risk Potential: Suicidal Ideations without plan, Homicidal Ideations none and Potential for Aggression No   Sensorium:  person and place   Cognition:  grossly intact   Consciousness:  awake    Attention: attention span appeared shorter than expected for age   Insight:  limited   Judgment: limited   Intellect fair   Gait/Station: normal gait/station   Motor Activity: no abnormal movements     Progress Toward Goals: progressing    Recommended Treatment:   Add risperidone 2 mg at HS for psychosis and mood stabilization  Continue with group therapy, milieu therapy and occupational therapy      Continue following current medications:   Current Facility-Administered Medications:  acetaminophen 650 mg Oral Q6H PRN Reyes Crease, PA-C   aluminum-magnesium hydroxide-simethicone 30 mL Oral Q4H PRN Reyes Crease, PA-C   benztropine 1 mg Intramuscular Q6H PRN Reyes Crease, PA-C   benztropine 1 mg Oral Q6H PRN Reyes Crease, PA-C   carbamide peroxide 5 drop Both Ears BID Tiffani Mckenzie, PA-C   escitalopram 10 mg Oral Daily [facility]   haloperidol 5 mg Oral Q6H PRN Reyes Crease, PA-C   haloperidol lactate 5 mg Intramuscular Q6H PRN Reyes Crease, PA-C   LORazepam 2 mg Intramuscular Q6H PRN Reyes Crease, PA-C   LORazepam 1 mg Oral Q6H PRN Reyes Crease, PA-C   magnesium hydroxide 30 mL Oral Daily PRN Reyes Crease, PA-C   OLANZapine 10 mg Intramuscular Q3H PRN Reyes Crease, PA-C   OLANZapine 10 mg Oral Q3H PRN Reyes Crease, PA-C   risperiDONE 1 mg Oral Q3H PRN Reyes Crease, PA-C   risperiDONE 2 mg Oral HS [facility]   traZODone 50 mg Oral HS PRN Reyes Crease, PA-C       Risks, benefits and possible side effects of Medications:   Risks, benefits, and possible side effects of medications explained to patient and patient verbalizes understanding  This note has been constructed using a voice recognition system  There may be translation, syntax,  or grammatical errors  If you have any questions, please contact the dictating provider  Fall with Harm Risk

## 2024-12-23 NOTE — ED ADULT NURSE NOTE - OBJECTIVE STATEMENT
89yoF PMH HTN  came to ED c/o vaginal bleeding s/p pessary change at her OBGYN last week. Pt states that from midnight to 3am she bleed through 4 menstrual pads. Pt states she normally doesn't bleed when her pessary is changed every few weeks, it happened to her once before when they inserted the wrong size. Pt denies abdominal pain, cramping, dizziness, chest pain, palpitations and SOB. Pt she is still bleeding but it is not as heavy.

## 2024-12-23 NOTE — ED PROVIDER NOTE - PATIENT PORTAL LINK FT
You can access the FollowMyHealth Patient Portal offered by [facility] by registering at the following website: [email or web address] By joining [employer]’s FollowMyHealth portal, you will also be able to view your health information using other applications (apps) compatible with our system.

## 2024-12-23 NOTE — ED ADULT TRIAGE NOTE - CHIEF COMPLAINT QUOTE
Pt presents to ED here for vaginal bleeding since last week change of pessary. Pt A&Ox4, NAD. Denies headache or abd pain.

## 2024-12-23 NOTE — ED PROVIDER NOTE - CLINICAL SUMMARY MEDICAL DECISION MAKING FREE TEXT BOX
Vaginal bleeding as noted possibly related to 'lesion' and or pessary change.  Other considerations involve polyp, malignancy, trauma. Hb and VS stable.  BP improved.  Plan DC and close outpt fu.

## 2024-12-23 NOTE — ED ADULT NURSE NOTE - NS ED NURSE RECORD ANOTHER HT AND WT
Please call patient to schedule a med follow up with . Last visit 1/15/19. Ok to book telephone visit or if patient prefers in office appointment book further out.    Yes

## 2024-12-23 NOTE — ED PROVIDER NOTE - CCCP TRG CHIEF CMPLNT
SKILLED SERVICES PROVIDED / MEDICAL  DISCHARGE VISIT  ASSESSMENT OF SOCIAL AND EMOTIONAL FACTORS  COUNSELING FOR LONG RANGE PLANNING AND DECISION MAKING  COMMUNITY RESOURCE PLANNING    HOME/SOCIAL ENVIRONMENT-Patient was up, dressed and just finished PT visit when MSW arrived. Patient stated she had low energy and she states she was diagnosed with A-fib yesterday.   Patient denies any Covid signs or symptoms or an exposure to anyone with signs or symptoms of Covid.    FALL SINCE LAST VISIT-none
Education provided on the pain management plan of care
vaginal bleeding

## 2024-12-23 NOTE — ED PROVIDER NOTE - PHYSICAL EXAMINATION
Physical Exam:    CONSTITUTIONAL:  Generally well appearing, no acute distress, alert, awake and oriented  HEENT:  Moist mucous membranes, normal voice  PULM:  No accessory muscle use, speaking full sentences  SKIN:  Normal in appearance, normal color     Vaginal exam - chaperoned by ED nurse - minimal blood in vault 2-3 mls, dark, no active hemorrhage, pessary in place, no pelvic ttp.

## 2024-12-23 NOTE — ED PROVIDER NOTE - OBJECTIVE STATEMENT
88 yo F hx HTN not on blood thinners s/p pessary replacement last week by OB/GYN, at time was noted to have 'lesion' that OB/GYN decided not to cauterize per patient, now with ongoing vaginal bleeding since pessary changed last week, worsened this AM, 3 pad changes in 4 hours.  No lightheadedness, sob, weakness or ab/pelvic pain.

## 2024-12-23 NOTE — ED ADULT NURSE NOTE - NSFALLHARMRISKINTERV_ED_ALL_ED

## 2024-12-23 NOTE — ED ADULT NURSE NOTE - DISCHARGE DATE/TIME
23-Dec-2024 14:01 Rhomboid Transposition Flap Text: The defect edges were debeveled with a #15 scalpel blade.  Given the location of the defect and the proximity to free margins a rhomboid transposition flap was deemed most appropriate.  Using a sterile surgical marker, an appropriate rhomboid flap was drawn incorporating the defect.    The area thus outlined was incised deep to adipose tissue with a #15 scalpel blade.  The skin margins were undermined to an appropriate distance in all directions utilizing iris scissors.

## 2025-01-27 VITALS
SYSTOLIC BLOOD PRESSURE: 162 MMHG | OXYGEN SATURATION: 94 % | TEMPERATURE: 102 F | WEIGHT: 106.04 LBS | HEART RATE: 115 BPM | RESPIRATION RATE: 20 BRPM | HEIGHT: 62 IN | DIASTOLIC BLOOD PRESSURE: 81 MMHG

## 2025-01-27 LAB
ADD ON TEST-SPECIMEN IN LAB: SIGNIFICANT CHANGE UP
ALBUMIN SERPL ELPH-MCNC: 4.8 G/DL — SIGNIFICANT CHANGE UP (ref 3.3–5)
ALP SERPL-CCNC: 115 U/L — SIGNIFICANT CHANGE UP (ref 40–120)
ALT FLD-CCNC: 34 U/L — SIGNIFICANT CHANGE UP (ref 10–45)
ANION GAP SERPL CALC-SCNC: 12 MMOL/L — SIGNIFICANT CHANGE UP (ref 5–17)
APPEARANCE UR: CLEAR — SIGNIFICANT CHANGE UP
APTT BLD: 31.7 SEC — SIGNIFICANT CHANGE UP (ref 24.5–35.6)
AST SERPL-CCNC: 42 U/L — HIGH (ref 10–40)
BASOPHILS # BLD AUTO: 0 K/UL — SIGNIFICANT CHANGE UP (ref 0–0.2)
BASOPHILS NFR BLD AUTO: 0 % — SIGNIFICANT CHANGE UP (ref 0–2)
BILIRUB SERPL-MCNC: 0.6 MG/DL — SIGNIFICANT CHANGE UP (ref 0.2–1.2)
BILIRUB UR-MCNC: NEGATIVE — SIGNIFICANT CHANGE UP
BUN SERPL-MCNC: 14 MG/DL — SIGNIFICANT CHANGE UP (ref 7–23)
CALCIUM SERPL-MCNC: 9.6 MG/DL — SIGNIFICANT CHANGE UP (ref 8.4–10.5)
CHLORIDE SERPL-SCNC: 92 MMOL/L — LOW (ref 96–108)
CO2 SERPL-SCNC: 25 MMOL/L — SIGNIFICANT CHANGE UP (ref 22–31)
COLOR SPEC: YELLOW — SIGNIFICANT CHANGE UP
CREAT SERPL-MCNC: 0.46 MG/DL — LOW (ref 0.5–1.3)
DIFF PNL FLD: NEGATIVE — SIGNIFICANT CHANGE UP
EGFR: 91 ML/MIN/1.73M2 — SIGNIFICANT CHANGE UP
EOSINOPHIL # BLD AUTO: 0 K/UL — SIGNIFICANT CHANGE UP (ref 0–0.5)
EOSINOPHIL NFR BLD AUTO: 0 % — SIGNIFICANT CHANGE UP (ref 0–6)
FLUAV AG NPH QL: SIGNIFICANT CHANGE UP
FLUBV AG NPH QL: SIGNIFICANT CHANGE UP
GLUCOSE SERPL-MCNC: 82 MG/DL — SIGNIFICANT CHANGE UP (ref 70–99)
GLUCOSE UR QL: NEGATIVE MG/DL — SIGNIFICANT CHANGE UP
HCT VFR BLD CALC: 39.2 % — SIGNIFICANT CHANGE UP (ref 34.5–45)
HGB BLD-MCNC: 13.4 G/DL — SIGNIFICANT CHANGE UP (ref 11.5–15.5)
INR BLD: 1.04 — SIGNIFICANT CHANGE UP (ref 0.85–1.16)
KETONES UR-MCNC: ABNORMAL MG/DL
LACTATE SERPL-SCNC: 0.8 MMOL/L — SIGNIFICANT CHANGE UP (ref 0.5–2)
LEUKOCYTE ESTERASE UR-ACNC: ABNORMAL
LYMPHOCYTES # BLD AUTO: 0.49 K/UL — LOW (ref 1–3.3)
LYMPHOCYTES # BLD AUTO: 2.7 % — LOW (ref 13–44)
MCHC RBC-ENTMCNC: 30 PG — SIGNIFICANT CHANGE UP (ref 27–34)
MCHC RBC-ENTMCNC: 34.2 G/DL — SIGNIFICANT CHANGE UP (ref 32–36)
MCV RBC AUTO: 87.9 FL — SIGNIFICANT CHANGE UP (ref 80–100)
MONOCYTES # BLD AUTO: 0.81 K/UL — SIGNIFICANT CHANGE UP (ref 0–0.9)
MONOCYTES NFR BLD AUTO: 4.4 % — SIGNIFICANT CHANGE UP (ref 2–14)
NEUTROPHILS # BLD AUTO: 17.03 K/UL — HIGH (ref 1.8–7.4)
NEUTROPHILS NFR BLD AUTO: 92.9 % — HIGH (ref 43–77)
NITRITE UR-MCNC: NEGATIVE — SIGNIFICANT CHANGE UP
PH UR: 7 — SIGNIFICANT CHANGE UP (ref 5–8)
PLATELET # BLD AUTO: 219 K/UL — SIGNIFICANT CHANGE UP (ref 150–400)
POTASSIUM SERPL-MCNC: 3.8 MMOL/L — SIGNIFICANT CHANGE UP (ref 3.5–5.3)
POTASSIUM SERPL-SCNC: 3.8 MMOL/L — SIGNIFICANT CHANGE UP (ref 3.5–5.3)
PROT SERPL-MCNC: 7.7 G/DL — SIGNIFICANT CHANGE UP (ref 6–8.3)
PROT UR-MCNC: NEGATIVE MG/DL — SIGNIFICANT CHANGE UP
PROTHROM AB SERPL-ACNC: 12.2 SEC — SIGNIFICANT CHANGE UP (ref 9.9–13.4)
RBC # BLD: 4.46 M/UL — SIGNIFICANT CHANGE UP (ref 3.8–5.2)
RBC # FLD: 14 % — SIGNIFICANT CHANGE UP (ref 10.3–14.5)
RSV RNA NPH QL NAA+NON-PROBE: SIGNIFICANT CHANGE UP
SARS-COV-2 RNA SPEC QL NAA+PROBE: SIGNIFICANT CHANGE UP
SODIUM SERPL-SCNC: 129 MMOL/L — LOW (ref 135–145)
SP GR SPEC: 1.01 — SIGNIFICANT CHANGE UP (ref 1–1.03)
UROBILINOGEN FLD QL: 0.2 MG/DL — SIGNIFICANT CHANGE UP (ref 0.2–1)
WBC # BLD: 18.33 K/UL — HIGH (ref 3.8–10.5)
WBC # FLD AUTO: 18.33 K/UL — HIGH (ref 3.8–10.5)

## 2025-01-27 PROCEDURE — 99291 CRITICAL CARE FIRST HOUR: CPT

## 2025-01-27 PROCEDURE — 93010 ELECTROCARDIOGRAM REPORT: CPT

## 2025-01-27 PROCEDURE — 71045 X-RAY EXAM CHEST 1 VIEW: CPT | Mod: 26

## 2025-01-27 RX ORDER — ACETAMINOPHEN 160 MG/5ML
725 SUSPENSION ORAL ONCE
Refills: 0 | Status: COMPLETED | OUTPATIENT
Start: 2025-01-27 | End: 2025-01-27

## 2025-01-27 RX ORDER — SODIUM CHLORIDE 9 G/ML
1000 INJECTION, SOLUTION INTRAVENOUS ONCE
Refills: 0 | Status: COMPLETED | OUTPATIENT
Start: 2025-01-27 | End: 2025-01-27

## 2025-01-27 RX ORDER — SODIUM CHLORIDE 9 G/ML
500 INJECTION, SOLUTION INTRAVENOUS ONCE
Refills: 0 | Status: COMPLETED | OUTPATIENT
Start: 2025-01-27 | End: 2025-01-27

## 2025-01-27 RX ORDER — VANCOMYCIN HYDROCHLORIDE 50 MG/ML
1000 KIT ORAL ONCE
Refills: 0 | Status: COMPLETED | OUTPATIENT
Start: 2025-01-27 | End: 2025-01-27

## 2025-01-27 RX ORDER — PIPERACILLIN SODIUM AND TAZOBACTAM SODIUM 2; 250 G/50ML; MG/50ML
3.38 INJECTION, POWDER, FOR SOLUTION INTRAVENOUS ONCE
Refills: 0 | Status: COMPLETED | OUTPATIENT
Start: 2025-01-27 | End: 2025-01-27

## 2025-01-27 RX ADMIN — PIPERACILLIN SODIUM AND TAZOBACTAM SODIUM 200 GRAM(S): 2; 250 INJECTION, POWDER, FOR SOLUTION INTRAVENOUS at 23:04

## 2025-01-27 RX ADMIN — SODIUM CHLORIDE 1000 MILLILITER(S): 9 INJECTION, SOLUTION INTRAVENOUS at 21:45

## 2025-01-27 RX ADMIN — ACETAMINOPHEN 290 MILLIGRAM(S): 160 SUSPENSION ORAL at 23:04

## 2025-01-27 RX ADMIN — VANCOMYCIN HYDROCHLORIDE 250 MILLIGRAM(S): KIT at 23:44

## 2025-01-27 RX ADMIN — SODIUM CHLORIDE 500 MILLILITER(S): 9 INJECTION, SOLUTION INTRAVENOUS at 22:33

## 2025-01-27 NOTE — ED ADULT NURSE NOTE - NS_SISCREENINGSR_GEN_ALL_ED
DISCHARGE SUMMARY       LORRAINE BABY BOY (Erasto) MRN: [id number] PAC: [id number]   Admit Date: 2023   Admit Time: 20:29:00   Admission Type: Following Delivery      Hospitalization Summary   Hospital Name: [facility]   Service Type: NICU   Admit Date: 2023   Admit Time: 20:29      Discharge Date: 2023   Discharge Time: 06:27         DISCHARGE SUMMARY   BW: 2650 (gms)   Admit DOL: 0   Disposition: Discharge Home      Admit GA: 36 wks 1 d   Admission Weight: 2650 (gms)   Time Spent: > 30 mins      Discharge Weight: 2701 (gms)   Discharge Date: 2023   Discharge Time: 06:27   Discharge CGA: 37 wks 6 d         Admission Type: Following Delivery   Birth Hospital: [facility]         ACTIVE DIAGNOSIS   Diagnosis: Nutritional Support   System: FEN/GI   Start Date: 2023      History: Infant made NPO for respiratory distress.  S/P IV for emesis.      Assessment: Normal output.  Great intake on ad harris and good weight gain.      Plan: Continue 20 calorie feeds  ad harris.  Follow weight.         HEALTH MAINTENANCE (SCREENING & IMMUNIZATION)   [place] Screening   Screening Date: 2023   Status: Done      Screening Date: 2023   Status: Done      Screening Date: 2023   Status: Ordered      Hearing Screening   Hearing Screen Date: 2023   Status: Done   Hearing Screen Result : Passed      [facility]   Screening Date: [facility]   Screen Result : Pass   Status: Done      Immunization   Immunization Date: 2023   Immunization Type: Hepatitis B   Status: Done         DISCHARGE NUTRITION   Intake Type: 20 kcal/oz DBM   Total (mL/kg/d): -1         DISCHARGE PHYSICAL EXAM   DOL: 12      Today's Weight (g): 2701   Change 24 hrs: 30   Change 7 days: 61      Birth Weight (g): 2650   Birth Gest: 36 wks 1 d   Pos-Mens Age: 37 wks 6 d      Date: 2023      Place of Service: NICU      Head/Neck: Anterior fontanel is soft and flat. No oral  lesions.      Chest: Clear, equal breath sounds. Good aeration.      Heart: Regular rate. No murmur. Perfusion adequate.      Abdomen: Soft and flat. No hepatosplenomegaly. Normal bowel sounds.      Extremities: No deformities noted. Normal range of motion for all extremities.      Neurologic: Normal tone and activity.      Skin: Pink with no rashes, vesicles, or other lesions are noted.         MATERNAL HISTORY   Edith Vogt   Mother's Blood Type: O Pos   Mother's Ethnicity:  or       P: 2   A: 1   Syphilis: Negative   HIV: Negative   Rubella: Immune   GBS: Negative   HBsAg: Negative   Hep C:   GC:   Chlamydia:   EDC OB: 2023      Complications - Preg/Labor/Deliv: Yes   Premature onset of labor      Maternal Steroids No         DELIVERY HISTORY   YOB: 2023   Time of Birth: 18:20:00   Fluid at Delivery: Clear   Birth Type: Single   Birth Order: Single   Presentation: Vertex   Anesthesia: Epidural   ROM Prior to Delivery: Yes   Date: 2023   Time: 17:04:00   Hrs Prior to Delivery: 1   Delivery Type: Vaginal   Reason for Attending: Respiratory Distress - (other)   Birth Hospital: [facility]      Delivery Procedures Monitoring VS, NP/OP Suctioning, Supplemental O2,   Warming/Drying      APGARS   1 Minute: 8   5 Minute: 9         Physician at Delivery: LIANG APRIL         PROCEDURES HISTORY   Circumcision with Penile Block,   2023-2023,   1,   NICU,     ENID GUAMAN MD         MEDICATIONS HISTORY   Erythromycin Eye Ointment (Once), Start Date: 2023, End Date: 2023,   Duration: 1      Vitamin K (Once), Start Date: 2023, End Date: 2023, Duration: 1      Morphine sulfate, Start Date: 2023, End Date: 2023, Duration: 3   Comment: For pneumothorax ppx          LAB CULTURE HISTORY   Type: Blood   Date Done: 2023   Result: No Growth   Comments peripheral         RESPIRATORY SUPPORT HISTORY    Start Date: 2023   End Date: 2023   Duration: 4   Type: Nasal Cannula      Start Date: 2023   End Date: 2023   Duration: 4   Type: High Flow Nasal Cannula delivering CPAP      Start Date: 2023   End Date: 2023   Duration: 3   Type: Nasal CPAP FiO2: 0.25 CPAP: 5          DIAGNOSIS HISTORY   Diagnosis: Respiratory Distress - (other) (P22.8)   System: Respiratory   Start Date: 2023   End Date: 2023   Resolved      History: Placed on Nasal CPAP support on admission. Fair aeration on exam.   Tachypneic. Requiring 30% O2. CXR consistent with retained fetal lung fluid.    10/21 early am received Curosurf.   No apnea.      Assessment: Stable off O2.      Diagnosis: Infectious Screen <= 28D (P00.2)   System: Infectious Disease   Start Date: 2023   End Date: 2023   Resolved      History: Induction of labor for maternal elevated LFTs and cholestasis of   pregnancy. GBS neg. Low risk for sepsis. Blood culture negative.      Diagnosis: Late  Infant 36 wks (P07.39)   System: Gestation   Start Date: 2023   End Date: 2023   Resolved      History: This is a 36 wks and 2650 grams late premature infant.      Diagnosis: At risk for Hyperbilirubinemia   System: Hyperbilirubinemia   Start Date: 2023   End Date: 2023   Resolved      History: Mother is O pos. Baby blood type O.  Peak bili 15.6 at 6 days and   spontaneously declining.  No phototherapy.      Diagnosis: Parental Support   System: Psychosocial Intervention   Start Date: 2023   End Date: 2023   Resolved      History: Live in [place].  Updated mother at bedside yesterday.      Plan: Keep family updated.         ATTESTATION      Authenticated by: ENID GUAMAN MD   Date/Time: 2023 06:29                Negative

## 2025-01-27 NOTE — ED ADULT TRIAGE NOTE - CHIEF COMPLAINT QUOTE
Pt. bib daughter from home with c/o "shaking", h/a and elevated BP, pt. is noted to have fever, daughter states her grandson has flu.

## 2025-01-28 ENCOUNTER — INPATIENT (INPATIENT)
Facility: HOSPITAL | Age: 89
LOS: 1 days | Discharge: HOME HEALTH | End: 2025-01-30
Attending: STUDENT IN AN ORGANIZED HEALTH CARE EDUCATION/TRAINING PROGRAM | Admitting: STUDENT IN AN ORGANIZED HEALTH CARE EDUCATION/TRAINING PROGRAM
Payer: MEDICARE

## 2025-01-28 DIAGNOSIS — E87.1 HYPO-OSMOLALITY AND HYPONATREMIA: ICD-10-CM

## 2025-01-28 DIAGNOSIS — A41.9 SEPSIS, UNSPECIFIED ORGANISM: ICD-10-CM

## 2025-01-28 DIAGNOSIS — I10 ESSENTIAL (PRIMARY) HYPERTENSION: ICD-10-CM

## 2025-01-28 DIAGNOSIS — Z29.9 ENCOUNTER FOR PROPHYLACTIC MEASURES, UNSPECIFIED: ICD-10-CM

## 2025-01-28 DIAGNOSIS — E78.5 HYPERLIPIDEMIA, UNSPECIFIED: ICD-10-CM

## 2025-01-28 LAB
ADD ON TEST-SPECIMEN IN LAB: SIGNIFICANT CHANGE UP
ALBUMIN SERPL ELPH-MCNC: 3.7 G/DL — SIGNIFICANT CHANGE UP (ref 3.3–5)
ALP SERPL-CCNC: 97 U/L — SIGNIFICANT CHANGE UP (ref 40–120)
ALT FLD-CCNC: 24 U/L — SIGNIFICANT CHANGE UP (ref 10–45)
ANION GAP SERPL CALC-SCNC: 12 MMOL/L — SIGNIFICANT CHANGE UP (ref 5–17)
AST SERPL-CCNC: 33 U/L — SIGNIFICANT CHANGE UP (ref 10–40)
BASOPHILS # BLD AUTO: 0 K/UL — SIGNIFICANT CHANGE UP (ref 0–0.2)
BASOPHILS NFR BLD AUTO: 0 % — SIGNIFICANT CHANGE UP (ref 0–2)
BILIRUB SERPL-MCNC: 0.6 MG/DL — SIGNIFICANT CHANGE UP (ref 0.2–1.2)
BUN SERPL-MCNC: 11 MG/DL — SIGNIFICANT CHANGE UP (ref 7–23)
CALCIUM SERPL-MCNC: 9 MG/DL — SIGNIFICANT CHANGE UP (ref 8.4–10.5)
CHLORIDE SERPL-SCNC: 98 MMOL/L — SIGNIFICANT CHANGE UP (ref 96–108)
CO2 SERPL-SCNC: 22 MMOL/L — SIGNIFICANT CHANGE UP (ref 22–31)
CREAT ?TM UR-MCNC: 26 MG/DL — SIGNIFICANT CHANGE UP
CREAT SERPL-MCNC: 0.5 MG/DL — SIGNIFICANT CHANGE UP (ref 0.5–1.3)
EGFR: 90 ML/MIN/1.73M2 — SIGNIFICANT CHANGE UP
EOSINOPHIL # BLD AUTO: 0 K/UL — SIGNIFICANT CHANGE UP (ref 0–0.5)
EOSINOPHIL NFR BLD AUTO: 0 % — SIGNIFICANT CHANGE UP (ref 0–6)
GLUCOSE SERPL-MCNC: 93 MG/DL — SIGNIFICANT CHANGE UP (ref 70–99)
HCT VFR BLD CALC: 37.4 % — SIGNIFICANT CHANGE UP (ref 34.5–45)
HGB BLD-MCNC: 11.9 G/DL — SIGNIFICANT CHANGE UP (ref 11.5–15.5)
LYMPHOCYTES # BLD AUTO: 0.87 K/UL — LOW (ref 1–3.3)
LYMPHOCYTES # BLD AUTO: 3.6 % — LOW (ref 13–44)
MAGNESIUM SERPL-MCNC: 1.8 MG/DL — SIGNIFICANT CHANGE UP (ref 1.6–2.6)
MCHC RBC-ENTMCNC: 28.5 PG — SIGNIFICANT CHANGE UP (ref 27–34)
MCHC RBC-ENTMCNC: 31.8 G/DL — LOW (ref 32–36)
MCV RBC AUTO: 89.5 FL — SIGNIFICANT CHANGE UP (ref 80–100)
MONOCYTES # BLD AUTO: 2.34 K/UL — HIGH (ref 0–0.9)
MONOCYTES NFR BLD AUTO: 9.7 % — SIGNIFICANT CHANGE UP (ref 2–14)
NEUTROPHILS # BLD AUTO: 20.88 K/UL — HIGH (ref 1.8–7.4)
NEUTROPHILS NFR BLD AUTO: 86.7 % — HIGH (ref 43–77)
OSMOLALITY UR: 286 MOSM/KG — LOW (ref 300–900)
PHOSPHATE SERPL-MCNC: 3.8 MG/DL — SIGNIFICANT CHANGE UP (ref 2.5–4.5)
PLATELET # BLD AUTO: 196 K/UL — SIGNIFICANT CHANGE UP (ref 150–400)
POTASSIUM SERPL-MCNC: 3.6 MMOL/L — SIGNIFICANT CHANGE UP (ref 3.5–5.3)
POTASSIUM SERPL-SCNC: 3.6 MMOL/L — SIGNIFICANT CHANGE UP (ref 3.5–5.3)
PROT SERPL-MCNC: 6.2 G/DL — SIGNIFICANT CHANGE UP (ref 6–8.3)
RBC # BLD: 4.18 M/UL — SIGNIFICANT CHANGE UP (ref 3.8–5.2)
RBC # FLD: 14.2 % — SIGNIFICANT CHANGE UP (ref 10.3–14.5)
SODIUM SERPL-SCNC: 132 MMOL/L — LOW (ref 135–145)
SODIUM UR-SCNC: 43 MMOL/L — SIGNIFICANT CHANGE UP
WBC # BLD: 24.08 K/UL — HIGH (ref 3.8–10.5)
WBC # FLD AUTO: 24.08 K/UL — HIGH (ref 3.8–10.5)

## 2025-01-28 PROCEDURE — 99223 1ST HOSP IP/OBS HIGH 75: CPT | Mod: GC

## 2025-01-28 PROCEDURE — 99497 ADVNCD CARE PLAN 30 MIN: CPT | Mod: 25,GC

## 2025-01-28 RX ORDER — MAGNESIUM SULFATE 0.8 MEQ/ML
2 AMPUL (ML) INJECTION ONCE
Refills: 0 | Status: COMPLETED | OUTPATIENT
Start: 2025-01-28 | End: 2025-01-28

## 2025-01-28 RX ORDER — ACETAMINOPHEN 160 MG/5ML
650 SUSPENSION ORAL EVERY 6 HOURS
Refills: 0 | Status: DISCONTINUED | OUTPATIENT
Start: 2025-01-28 | End: 2025-01-30

## 2025-01-28 RX ORDER — ASPIRIN 81 MG/1
81 TABLET, COATED ORAL DAILY
Refills: 0 | Status: DISCONTINUED | OUTPATIENT
Start: 2025-01-28 | End: 2025-01-30

## 2025-01-28 RX ORDER — ONDANSETRON 4 MG/1
4 TABLET, ORALLY DISINTEGRATING ORAL EVERY 8 HOURS
Refills: 0 | Status: DISCONTINUED | OUTPATIENT
Start: 2025-01-28 | End: 2025-01-30

## 2025-01-28 RX ORDER — ENOXAPARIN SODIUM 100 MG/ML
30 INJECTION SUBCUTANEOUS EVERY 24 HOURS
Refills: 0 | Status: DISCONTINUED | OUTPATIENT
Start: 2025-01-28 | End: 2025-01-30

## 2025-01-28 RX ORDER — ACETAMINOPHEN, DIPHENHYDRAMINE HCL, PHENYLEPHRINE HCL 325; 25; 5 MG/1; MG/1; MG/1
3 TABLET ORAL AT BEDTIME
Refills: 0 | Status: DISCONTINUED | OUTPATIENT
Start: 2025-01-28 | End: 2025-01-30

## 2025-01-28 RX ORDER — VANCOMYCIN HYDROCHLORIDE 50 MG/ML
1000 KIT ORAL EVERY 12 HOURS
Refills: 0 | Status: COMPLETED | OUTPATIENT
Start: 2025-01-28 | End: 2025-01-28

## 2025-01-28 RX ORDER — ATORVASTATIN CALCIUM 80 MG/1
40 TABLET, FILM COATED ORAL AT BEDTIME
Refills: 0 | Status: DISCONTINUED | OUTPATIENT
Start: 2025-01-28 | End: 2025-01-30

## 2025-01-28 RX ORDER — POTASSIUM CHLORIDE 750 MG/1
40 TABLET, EXTENDED RELEASE ORAL ONCE
Refills: 0 | Status: COMPLETED | OUTPATIENT
Start: 2025-01-28 | End: 2025-01-28

## 2025-01-28 RX ORDER — PIPERACILLIN SODIUM AND TAZOBACTAM SODIUM 2; 250 G/50ML; MG/50ML
3.38 INJECTION, POWDER, FOR SOLUTION INTRAVENOUS EVERY 8 HOURS
Refills: 0 | Status: DISCONTINUED | OUTPATIENT
Start: 2025-01-28 | End: 2025-01-30

## 2025-01-28 RX ORDER — BACTERIOSTATIC SODIUM CHLORIDE 0.9 %
500 VIAL (ML) INJECTION ONCE
Refills: 0 | Status: COMPLETED | OUTPATIENT
Start: 2025-01-28 | End: 2025-01-28

## 2025-01-28 RX ADMIN — SODIUM CHLORIDE 1000 MILLILITER(S): 9 INJECTION, SOLUTION INTRAVENOUS at 00:25

## 2025-01-28 RX ADMIN — ACETAMINOPHEN 725 MILLIGRAM(S): 160 SUSPENSION ORAL at 00:25

## 2025-01-28 RX ADMIN — VANCOMYCIN HYDROCHLORIDE 1000 MILLIGRAM(S): KIT at 00:25

## 2025-01-28 RX ADMIN — ASPIRIN 81 MILLIGRAM(S): 81 TABLET, COATED ORAL at 11:16

## 2025-01-28 RX ADMIN — VANCOMYCIN HYDROCHLORIDE 250 MILLIGRAM(S): KIT at 22:23

## 2025-01-28 RX ADMIN — ACETAMINOPHEN 725 MILLIGRAM(S): 160 SUSPENSION ORAL at 03:15

## 2025-01-28 RX ADMIN — VANCOMYCIN HYDROCHLORIDE 250 MILLIGRAM(S): KIT at 11:16

## 2025-01-28 RX ADMIN — ENOXAPARIN SODIUM 30 MILLIGRAM(S): 100 INJECTION SUBCUTANEOUS at 21:02

## 2025-01-28 RX ADMIN — ATORVASTATIN CALCIUM 40 MILLIGRAM(S): 80 TABLET, FILM COATED ORAL at 21:02

## 2025-01-28 RX ADMIN — PIPERACILLIN SODIUM AND TAZOBACTAM SODIUM 25 GRAM(S): 2; 250 INJECTION, POWDER, FOR SOLUTION INTRAVENOUS at 08:07

## 2025-01-28 RX ADMIN — POTASSIUM CHLORIDE 40 MILLIEQUIVALENT(S): 750 TABLET, EXTENDED RELEASE ORAL at 09:43

## 2025-01-28 RX ADMIN — SODIUM CHLORIDE 500 MILLILITER(S): 9 INJECTION, SOLUTION INTRAVENOUS at 00:25

## 2025-01-28 RX ADMIN — PIPERACILLIN SODIUM AND TAZOBACTAM SODIUM 25 GRAM(S): 2; 250 INJECTION, POWDER, FOR SOLUTION INTRAVENOUS at 15:57

## 2025-01-28 RX ADMIN — PIPERACILLIN SODIUM AND TAZOBACTAM SODIUM 3.38 GRAM(S): 2; 250 INJECTION, POWDER, FOR SOLUTION INTRAVENOUS at 00:25

## 2025-01-28 RX ADMIN — Medication 25 GRAM(S): at 09:43

## 2025-01-28 RX ADMIN — PIPERACILLIN SODIUM AND TAZOBACTAM SODIUM 25 GRAM(S): 2; 250 INJECTION, POWDER, FOR SOLUTION INTRAVENOUS at 23:29

## 2025-01-28 RX ADMIN — Medication 500 MILLILITER(S): at 02:16

## 2025-01-28 NOTE — H&P ADULT - PROBLEM SELECTOR PLAN 5
F: s/p 1.5L NS  E: replete prn  D: regular  DVT: lovenox  Code: full  Dispo: [facility]

## 2025-01-28 NOTE — ED PROVIDER NOTE - CLINICAL SUMMARY MEDICAL DECISION MAKING FREE TEXT BOX
Triage VS - temp 101.6,   A/P- infectious workup, consider viral (sick contact w/ flu) vs bacterial infection r/o uti, pneumonia, bacteremia (given described rigors)    -->labs w/ WBC 18 and neutrophilic predominance, negative lactate. Treat for sepsis- empiric broad spectrum abx, iv fluids, antipyretics  -->ua negative, cxr no infiltrate. no neck stiffness or ams do not think meningoencephalitis, will admit to medicine

## 2025-01-28 NOTE — H&P ADULT - NSHPLABSRESULTS_GEN_ALL_CORE
.  LABS:                         13.4   18.33 )-----------( 219      ( 2025 22:02 )             39.2         129[L]  |  92[L]  |  14  ----------------------------<  82  3.8   |  25  |  0.46[L]    Ca    9.6      2025 22:02    TPro  7.7  /  Alb  4.8  /  TBili  0.6  /  DBili  x   /  AST  42[H]  /  ALT  34  /  AlkPhos  115      PT/INR - ( 2025 22:02 )   PT: 12.2 sec;   INR: 1.04          PTT - ( 2025 22:02 )  PTT:31.7 sec  Urinalysis Basic - ( 2025 22:04 )    Color: Yellow / Appearance: Clear / S.012 / pH: x  Gluc: x / Ketone: Trace mg/dL  / Bili: Negative / Urobili: 0.2 mg/dL   Blood: x / Protein: Negative mg/dL / Nitrite: Negative   Leuk Esterase: Small / RBC: 2 /HPF / WBC 3 /HPF   Sq Epi: x / Non Sq Epi: 1 /HPF / Bacteria: Negative /HPF            Lactate, Blood: 0.8 mmol/L ( @ 22:02)      RADIOLOGY, EKG & ADDITIONAL TESTS: Reviewed.

## 2025-01-28 NOTE — H&P ADULT - ASSESSMENT
89yoF with a PMHx of HTN, HLD presents for fever and chills, admitted for sepsis of unknown origin.

## 2025-01-28 NOTE — PROGRESS NOTE ADULT - SUBJECTIVE AND OBJECTIVE BOX
Progress Note    SUBJECTIVE:   Patient seen and examined at bedside. Pt reports feeling comfortable and having no complaints other than HA, which is new, and lightheadedness, which is a chronic issue.   Denies vision changes, focal weakness, neck stiffness, SOB, CP, GI/ sx.    ROS:  Negative unless otherwise stated above.    PHYSICAL EXAM:  General: Alert and oriented x 3. No acute distress.   HEENT: Moist mucous membranes.    Cardiovascular: Regular rate and rhythm. No murmur.   Lungs: Clear to auscultation bilaterally. No accessory muscle use.  Abdomen: Soft, non-tender and non-distended.    Extremities: No edema. Non-tender. Warm.  Neurologic: No apparent focal neurological deficits. CN II-XII grossly intact, but not individually tested.  Psychiatric: Cooperative. Appropriate mood and affect.    VITAL SIGNS:  Vital Signs Last 24 Hrs  T(C): 36.7 (28 Jan 2025 11:20), Max: 38.7 (27 Jan 2025 20:17)  T(F): 98 (28 Jan 2025 11:20), Max: 101.6 (27 Jan 2025 20:17)  HR: 75 (28 Jan 2025 11:20) (71 - 115)  BP: 126/68 (28 Jan 2025 11:20) (93/52 - 162/81)  BP(mean): --  RR: 18 (28 Jan 2025 11:20) (17 - 20)  SpO2: 96% (28 Jan 2025 11:20) (94% - 100%)    Parameters below as of 28 Jan 2025 11:20  Patient On (Oxygen Delivery Method): room air      INPATIENT MEDICATIONS:   MEDICATIONS  (STANDING):  aspirin enteric coated 81 milliGRAM(s) Oral daily  atorvastatin 40 milliGRAM(s) Oral at bedtime  enoxaparin Injectable 30 milliGRAM(s) SubCutaneous every 24 hours  piperacillin/tazobactam IVPB.. 3.375 Gram(s) IV Intermittent every 8 hours  vancomycin  IVPB 1000 milliGRAM(s) IV Intermittent every 12 hours    MEDICATIONS  (PRN):  acetaminophen     Tablet .. 650 milliGRAM(s) Oral every 6 hours PRN Temp greater or equal to 38C (100.4F), Mild Pain (1 - 3)  melatonin 3 milliGRAM(s) Oral at bedtime PRN Insomnia  ondansetron Injectable 4 milliGRAM(s) IV Push every 8 hours PRN Nausea and/or Vomiting    HOME MEDICATIONS  aspirin 81 mg oral delayed release tablet: 1 tab(s) orally once a day  atorvastatin 40 mg oral tablet: 1 tab(s) orally once a day (at bedtime)    ALLERGIES:  Allergies    No Known Allergies    Intolerances    LABS:                       11.9   24.08 )-----------( 196      ( 28 Jan 2025 05:30 )             37.4     01-28    132[L]  |  98  |  11  ----------------------------<  93  3.6   |  22  |  0.50    Ca    9.0      28 Jan 2025 05:30  Phos  3.8     01-28  Mg     1.8     01-28    TPro  6.2  /  Alb  3.7  /  TBili  0.6  /  DBili  x   /  AST  33  /  ALT  24  /  AlkPhos  97  01-28    PT/INR - ( 27 Jan 2025 22:02 )   PT: 12.2 sec;   INR: 1.04          PTT - ( 27 Jan 2025 22:02 )  PTT:31.7 sec  Urinalysis Basic - ( 28 Jan 2025 05:30 )    Color: x / Appearance: x / SG: x / pH: x  Gluc: 93 mg/dL / Ketone: x  / Bili: x / Urobili: x   Blood: x / Protein: x / Nitrite: x   Leuk Esterase: x / RBC: x / WBC x   Sq Epi: x / Non Sq Epi: x / Bacteria: x      CAPILLARY BLOOD GLUCOSE        RADIOLOGY & ADDITIONAL TESTS: Reviewed. Progress Note    SUBJECTIVE:   Patient seen and examined at bedside. Pt reports feeling comfortable and having no complaints other than HA, which is new, and lightheadedness, which is a chronic issue.   Denies vision changes, focal weakness, neck stiffness, SOB, CP, GI/ sx.    ROS:  Negative unless otherwise stated above.    PHYSICAL EXAM:  General: Alert and oriented x 3. No acute distress.   HEENT: Moist mucous membranes. No neck stiffness.   Cardiovascular: Regular rate and rhythm. No murmur.   Lungs: Clear to auscultation bilaterally. No accessory muscle use.  Abdomen: Soft, non-tender and non-distended.    Extremities: No edema. Non-tender. Warm.  Neurologic: No apparent focal neurological deficits. CN II-XII grossly intact, but not individually tested.  Psychiatric: Cooperative. Appropriate mood and affect.    VITAL SIGNS:  Vital Signs Last 24 Hrs  T(C): 36.7 (28 Jan 2025 11:20), Max: 38.7 (27 Jan 2025 20:17)  T(F): 98 (28 Jan 2025 11:20), Max: 101.6 (27 Jan 2025 20:17)  HR: 75 (28 Jan 2025 11:20) (71 - 115)  BP: 126/68 (28 Jan 2025 11:20) (93/52 - 162/81)  BP(mean): --  RR: 18 (28 Jan 2025 11:20) (17 - 20)  SpO2: 96% (28 Jan 2025 11:20) (94% - 100%)    Parameters below as of 28 Jan 2025 11:20  Patient On (Oxygen Delivery Method): room air      INPATIENT MEDICATIONS:   MEDICATIONS  (STANDING):  aspirin enteric coated 81 milliGRAM(s) Oral daily  atorvastatin 40 milliGRAM(s) Oral at bedtime  enoxaparin Injectable 30 milliGRAM(s) SubCutaneous every 24 hours  piperacillin/tazobactam IVPB.. 3.375 Gram(s) IV Intermittent every 8 hours  vancomycin  IVPB 1000 milliGRAM(s) IV Intermittent every 12 hours    MEDICATIONS  (PRN):  acetaminophen     Tablet .. 650 milliGRAM(s) Oral every 6 hours PRN Temp greater or equal to 38C (100.4F), Mild Pain (1 - 3)  melatonin 3 milliGRAM(s) Oral at bedtime PRN Insomnia  ondansetron Injectable 4 milliGRAM(s) IV Push every 8 hours PRN Nausea and/or Vomiting    HOME MEDICATIONS  aspirin 81 mg oral delayed release tablet: 1 tab(s) orally once a day  atorvastatin 40 mg oral tablet: 1 tab(s) orally once a day (at bedtime)    ALLERGIES:  Allergies    No Known Allergies    Intolerances    LABS:                       11.9   24.08 )-----------( 196      ( 28 Jan 2025 05:30 )             37.4     01-28    132[L]  |  98  |  11  ----------------------------<  93  3.6   |  22  |  0.50    Ca    9.0      28 Jan 2025 05:30  Phos  3.8     01-28  Mg     1.8     01-28    TPro  6.2  /  Alb  3.7  /  TBili  0.6  /  DBili  x   /  AST  33  /  ALT  24  /  AlkPhos  97  01-28    PT/INR - ( 27 Jan 2025 22:02 )   PT: 12.2 sec;   INR: 1.04          PTT - ( 27 Jan 2025 22:02 )  PTT:31.7 sec  Urinalysis Basic - ( 28 Jan 2025 05:30 )    Color: x / Appearance: x / SG: x / pH: x  Gluc: 93 mg/dL / Ketone: x  / Bili: x / Urobili: x   Blood: x / Protein: x / Nitrite: x   Leuk Esterase: x / RBC: x / WBC x   Sq Epi: x / Non Sq Epi: x / Bacteria: x      CAPILLARY BLOOD GLUCOSE        RADIOLOGY & ADDITIONAL TESTS: Reviewed.

## 2025-01-28 NOTE — PROGRESS NOTE ADULT - PROBLEM SELECTOR PLAN 1
On admission, pt meeting 4/4 SIRS (T101.6, , RR 20, WBC 18). Had episode of rigors the day of admission but no other localizing signs of infection. Grandson she lives with recently had the flu. UA negative, CXR clear. RVP negative. Suspect may be 2/2 viral infection not picked up on RVP vs bacteremia (elv procal, CRP). No c/f meningitis - endorses HA but no meningeal signs. No skin lesions.   - s/p 1.5L NS in ED  - c/w vanc, zosyn   - f/u Bcx, Ucx On admission, pt meeting 4/4 SIRS (T101.6, , RR 20, WBC 18). Had episode of rigors the day of admission but no other localizing signs of infection. Grandson she lives with recently had the flu. UA negative, CXR clear. RVP negative. Suspect may be 2/2 viral infection not picked up on RVP vs bacteremia (elv procal, CRP). No c/f meningitis - endorses HA but no meningeal signs. No skin lesions.   - s/p 1.5L NS in ED  - c/w vanc, zosyn   - f/u Bcx, Ucx  - Consider echo if Bcx positive  - If febrile again on abx, will order CT Chest/A/P On admission, pt meeting 4/4 SIRS (T101.6, , RR 20, WBC 18) with SBP drop > 40mmHg in ED. Had episode of rigors the day of admission but no other localizing signs of infection. Grandson she lives with recently had the flu. UA negative, CXR clear. RVP negative. Suspect may be 2/2 viral infection not picked up on RVP vs bacteremia (elv procal, CRP). No c/f meningitis - endorses HA but no meningeal signs. No skin lesions.   - s/p 1.5L NS in ED  - c/w vanc, zosyn   - f/u Bcx, Ucx  - Consider echo if Bcx positive  - If febrile again on abx, will order CT Chest/A/P

## 2025-01-28 NOTE — H&P ADULT - CONVERSATION DETAILS
Patient designated HCP as daughter Dahlia Hoff ([phone]), would like to discuss code status with her.

## 2025-01-28 NOTE — ED ADULT NURSE REASSESSMENT NOTE - NS ED NURSE REASSESS COMMENT FT1
pt urinated  in bed, cleaned, bedsheet changed. gcs 15. nad. vss. monitors on. snacks given. assisting calling her daughter. no complaints at this time, pending bed

## 2025-01-28 NOTE — H&P ADULT - PROBLEM SELECTOR PLAN 2
Na 129 on admission. Of note, pt was here a few months ago for acute on chronic hyponatremia which was attributed to 2/2 HCTZ which was discontinued. Baseline Na appears to be ~128-130. Mentating well.   - f/u serum osm  - f/u urine studies

## 2025-01-28 NOTE — H&P ADULT - PROBLEM SELECTOR PLAN 1
On admission, pt meeting 4/4 SIRS (T101.6, , RR 20, WBC 18). Had episode of rigors the day of admission but no other localizing signs of infection. Grandson she lives with recently had the flu. UA negative, CXR clear. RVP negative. Suspect may be 2/2 viral infection not picked up on RVP. No c/f meningitis - no headache or meningeal signs. No skin lesions.   - s/p 1.5L NS in ED  - c/w vanc, zosyn   - f/u blood cxs On admission, pt meeting 4/4 SIRS (T101.6, , RR 20, WBC 18) with SBP drop >40mmHg in ED. Had episode of rigors the day of admission but no other localizing signs of infection. Grandson she lives with recently had the flu. UA negative, CXR clear. RVP negative. Suspect may be 2/2 viral infection not picked up on RVP. No c/f meningitis - no headache or meningeal signs. No skin lesions.   - s/p 1.5L NS in ED  - c/w vanc, zosyn   - f/u blood cxs

## 2025-01-28 NOTE — ED PROVIDER NOTE - PHYSICAL EXAMINATION
CONST: nontoxic NAD speaking in full sentences  HEAD: atraumatic  EYES: conjunctivae clear  MOUTH: little dry  NECK: supple  CARD: tachycardia  CHEST: breathing comfortably, no stridor/retractions/tripoding, clear BS b/l  ABD: soft nontender nondistended  EXT: FROM  SKIN: warm, dry  NEURO: awake alert answering questions following commands moving all extremities

## 2025-01-28 NOTE — H&P ADULT - TIME BILLING
chart review, patient interview/exam, review of labs/imaging, management of medical conditions, counseling/educating patient, documentation; excludes teaching and separately reported services

## 2025-01-28 NOTE — H&P ADULT - NSHPSOCIALHISTORY_GEN_ALL_CORE
Tob - never  Alc - very rare wine  Drugs - none  Lives with daughter and grandson  Ambulates without assistance inside, with cane if taking taxi, with walker if walking

## 2025-01-28 NOTE — PATIENT PROFILE ADULT - HAVE YOU RECENTLY LOST WEIGHT WITHOUT TRYING?
Department of Anesthesiology  Postprocedure Note    Patient: Geneva Braga  MRN: [id number]  YOB: 1982  Date of evaluation: 1/16/2024    Procedure Summary       Date: 01/16/24 Room / Location: [facility] MAIN OR [address] [place] [address] MAIN OR    Anesthesia Start: 0924 Anesthesia Stop: 1357    Procedure: RIGHT FOOT OFFSET, REMOVAL OF CYST TOE 5, RESECT NERVE INTERSPACE 3, BURY NERVE INTO MUSCLE, RIGHT BUNIONECTOMY (Right: Foot) Diagnosis:       Bello neuroma, right      Mucous cyst of toe      Bunion of right foot      (Bello neuroma, right [G57.61])      (Mucous cyst of toe [M67.479])      (Bunion of right foot [M21.611])    Providers: Beverley Mejia DPM Responsible Provider: Eufemia Dasilva DO    Anesthesia Type: MAC ASA Status: 2            Anesthesia Type: No value filed.    Elaine Phase I:      Elaine Phase II:      Anesthesia Post Evaluation    Patient location during evaluation: PACU  Level of consciousness: awake  Airway patency: patent  Nausea & Vomiting: no nausea  Cardiovascular status: hemodynamically stable  Respiratory status: acceptable  Hydration status: stable  Multimodal analgesia pain management approach  Pain management: adequate    No notable events documented.   No (0)

## 2025-01-28 NOTE — H&P ADULT - NSHPPHYSICALEXAM_GEN_ALL_CORE
T(C): 36.5 (01-28-25 @ 04:30), Max: 38.7 (01-27-25 @ 20:17)  HR: 78 (01-28-25 @ 04:30) (71 - 115)  BP: 153/70 (01-28-25 @ 04:30) (93/52 - 162/81)  RR: 20 (01-28-25 @ 04:30) (17 - 20)  SpO2: 98% (01-28-25 @ 04:30) (94% - 100%)    CONSTITUTIONAL: Well groomed, no apparent distress  EYES: EOMI, No conjunctival or scleral injection, non-icteric  ENMT: Normal dentition; no pharyngeal injection or exudates  RESP: No respiratory distress, no use of accessory muscles; CTA b/l, no WRR  CV: tachycardic rate reg rhythm, +S1S2, no MRG; no JVD; no peripheral edema  GI: Soft, NT, ND, no rebound, no guarding  MSK: Normal ROM without pain, no spinal tenderness, normal muscle strength/tone  NEURO: sensation intact in upper and lower extremities b/l to light touch   PSYCH: Appropriate insight/judgment; A+O x 3, mood and affect appropriate, recent/remote memory intact

## 2025-01-28 NOTE — H&P ADULT - HISTORY OF PRESENT ILLNESS
89yoF with a PMHx of HTN, HLD presents for fever and chills. Patient reports she was in her usual state of health until day of admission when she had an episode of fevers and shaking chills. Denies cough, sore throat, congestion, N/V/D, dysuria. Reports her grandson lives with her and recently had the flu. She received her flu shot this year. No recent travel.     In the ED  Vitals: T 101.6,  > 71, /81 > 93/52, RR 20 94% on RA  Labs: WBC 18.33, Na 129 (around baseline)  UA negative  RVP negative  EKG: Sinus tach w/ LVH  Int: vanc, zosyn, 1.5L LR

## 2025-01-28 NOTE — H&P ADULT - PROBLEM SELECTOR PLAN 3
Not on home meds. Initially hypertensive in ED >> borderline hypotensive, likely 2/2 sepsis.   - no interventions at this time

## 2025-01-28 NOTE — ED PROVIDER NOTE - OBJECTIVE STATEMENT
Condition Code 44 conditions met, a Utilization Review Committee member reviewed this case and agrees that this patient does not meet evidenced based criteria for inpatient services, requiring a change in patient status from \"admit as inpatient\" to \"place in observation\", in accordance with condition code 44. Medical care will continue to be provided by Janell Long PA-C, who agrees with the decision, as evidenced by the observation order/additional documentation.  FONSECA was signed by the patient and placed in the chart.  Copy of the MOON was given to the patient.  Explanation to the patient that they are in Observation status and Code 44 letter of Explanation was given to the patient.  All questions addressed. A copy of the Code 44 letter was placed in the chart to be scanned into the medical record.     89yoF with HTN, HLD, baseline functional, presents for fever and chills x 1 day. No other symptoms - no cough, nasal congestion, abd pain, n/v/d, urinary symptoms, neck pain or stiffness.  Sick contact- grandson w/ flu recently, had limited contact w/ patient

## 2025-01-28 NOTE — ED POST DISCHARGE NOTE - DETAILS
patient admitted, teams message sent to admission team to make aware of cxr discrepancy -Zackary Cochran PA-C

## 2025-01-28 NOTE — PROGRESS NOTE ADULT - PROBLEM SELECTOR PLAN 5
F: s/p 1.5L NS  E: replete prn  D: regular  DVT: lovenox  Code: full  Dispo: [facility]

## 2025-01-29 LAB
ANION GAP SERPL CALC-SCNC: 8 MMOL/L — SIGNIFICANT CHANGE UP (ref 5–17)
BASOPHILS # BLD AUTO: 0.04 K/UL — SIGNIFICANT CHANGE UP (ref 0–0.2)
BASOPHILS NFR BLD AUTO: 0.4 % — SIGNIFICANT CHANGE UP (ref 0–2)
BUN SERPL-MCNC: 8 MG/DL — SIGNIFICANT CHANGE UP (ref 7–23)
CALCIUM SERPL-MCNC: 8.8 MG/DL — SIGNIFICANT CHANGE UP (ref 8.4–10.5)
CHLORIDE SERPL-SCNC: 100 MMOL/L — SIGNIFICANT CHANGE UP (ref 96–108)
CO2 SERPL-SCNC: 23 MMOL/L — SIGNIFICANT CHANGE UP (ref 22–31)
CREAT SERPL-MCNC: 0.52 MG/DL — SIGNIFICANT CHANGE UP (ref 0.5–1.3)
CRP SERPL-MCNC: 95.2 MG/L — HIGH (ref 0–4)
EGFR: 89 ML/MIN/1.73M2 — SIGNIFICANT CHANGE UP
EOSINOPHIL # BLD AUTO: 0.08 K/UL — SIGNIFICANT CHANGE UP (ref 0–0.5)
EOSINOPHIL NFR BLD AUTO: 0.9 % — SIGNIFICANT CHANGE UP (ref 0–6)
GLUCOSE SERPL-MCNC: 97 MG/DL — SIGNIFICANT CHANGE UP (ref 70–99)
HCT VFR BLD CALC: 34.9 % — SIGNIFICANT CHANGE UP (ref 34.5–45)
HGB BLD-MCNC: 11.7 G/DL — SIGNIFICANT CHANGE UP (ref 11.5–15.5)
IMM GRANULOCYTES NFR BLD AUTO: 0.7 % — SIGNIFICANT CHANGE UP (ref 0–0.9)
LYMPHOCYTES # BLD AUTO: 1.06 K/UL — SIGNIFICANT CHANGE UP (ref 1–3.3)
LYMPHOCYTES # BLD AUTO: 11.6 % — LOW (ref 13–44)
MAGNESIUM SERPL-MCNC: 2 MG/DL — SIGNIFICANT CHANGE UP (ref 1.6–2.6)
MCHC RBC-ENTMCNC: 29.6 PG — SIGNIFICANT CHANGE UP (ref 27–34)
MCHC RBC-ENTMCNC: 33.5 G/DL — SIGNIFICANT CHANGE UP (ref 32–36)
MCV RBC AUTO: 88.4 FL — SIGNIFICANT CHANGE UP (ref 80–100)
MONOCYTES # BLD AUTO: 0.87 K/UL — SIGNIFICANT CHANGE UP (ref 0–0.9)
MONOCYTES NFR BLD AUTO: 9.5 % — SIGNIFICANT CHANGE UP (ref 2–14)
NEUTROPHILS # BLD AUTO: 7.04 K/UL — SIGNIFICANT CHANGE UP (ref 1.8–7.4)
NEUTROPHILS NFR BLD AUTO: 76.9 % — SIGNIFICANT CHANGE UP (ref 43–77)
NRBC # BLD: 0 /100 WBCS — SIGNIFICANT CHANGE UP (ref 0–0)
NRBC BLD-RTO: 0 /100 WBCS — SIGNIFICANT CHANGE UP (ref 0–0)
OSMOLALITY SERPL: 277 MOSM/KG — LOW (ref 280–301)
PHOSPHATE SERPL-MCNC: 2.5 MG/DL — SIGNIFICANT CHANGE UP (ref 2.5–4.5)
PLATELET # BLD AUTO: 196 K/UL — SIGNIFICANT CHANGE UP (ref 150–400)
POTASSIUM SERPL-MCNC: 3.9 MMOL/L — SIGNIFICANT CHANGE UP (ref 3.5–5.3)
POTASSIUM SERPL-SCNC: 3.9 MMOL/L — SIGNIFICANT CHANGE UP (ref 3.5–5.3)
PROCALCITONIN SERPL-MCNC: 6.28 NG/ML — HIGH (ref 0.02–0.1)
RBC # BLD: 3.95 M/UL — SIGNIFICANT CHANGE UP (ref 3.8–5.2)
RBC # FLD: 14.5 % — SIGNIFICANT CHANGE UP (ref 10.3–14.5)
SODIUM SERPL-SCNC: 131 MMOL/L — LOW (ref 135–145)
VANCOMYCIN TROUGH SERPL-MCNC: 13.7 UG/ML — SIGNIFICANT CHANGE UP (ref 10–20)
WBC # BLD: 9.15 K/UL — SIGNIFICANT CHANGE UP (ref 3.8–10.5)
WBC # FLD AUTO: 9.15 K/UL — SIGNIFICANT CHANGE UP (ref 3.8–10.5)

## 2025-01-29 PROCEDURE — 71260 CT THORAX DX C+: CPT | Mod: 26

## 2025-01-29 PROCEDURE — 74177 CT ABD & PELVIS W/CONTRAST: CPT | Mod: 26

## 2025-01-29 PROCEDURE — 99233 SBSQ HOSP IP/OBS HIGH 50: CPT | Mod: GC

## 2025-01-29 RX ORDER — VANCOMYCIN HYDROCHLORIDE 50 MG/ML
1000 KIT ORAL EVERY 12 HOURS
Refills: 0 | Status: COMPLETED | OUTPATIENT
Start: 2025-01-29 | End: 2025-01-30

## 2025-01-29 RX ADMIN — ASPIRIN 81 MILLIGRAM(S): 81 TABLET, COATED ORAL at 12:29

## 2025-01-29 RX ADMIN — ATORVASTATIN CALCIUM 40 MILLIGRAM(S): 80 TABLET, FILM COATED ORAL at 21:32

## 2025-01-29 RX ADMIN — ENOXAPARIN SODIUM 30 MILLIGRAM(S): 100 INJECTION SUBCUTANEOUS at 20:59

## 2025-01-29 RX ADMIN — VANCOMYCIN HYDROCHLORIDE 250 MILLIGRAM(S): KIT at 14:55

## 2025-01-29 RX ADMIN — PIPERACILLIN SODIUM AND TAZOBACTAM SODIUM 25 GRAM(S): 2; 250 INJECTION, POWDER, FOR SOLUTION INTRAVENOUS at 07:02

## 2025-01-29 RX ADMIN — PIPERACILLIN SODIUM AND TAZOBACTAM SODIUM 25 GRAM(S): 2; 250 INJECTION, POWDER, FOR SOLUTION INTRAVENOUS at 16:18

## 2025-01-29 RX ADMIN — PIPERACILLIN SODIUM AND TAZOBACTAM SODIUM 25 GRAM(S): 2; 250 INJECTION, POWDER, FOR SOLUTION INTRAVENOUS at 22:31

## 2025-01-29 RX ADMIN — VANCOMYCIN HYDROCHLORIDE 250 MILLIGRAM(S): KIT at 23:52

## 2025-01-29 NOTE — PROGRESS NOTE ADULT - PROBLEM SELECTOR PLAN 4
- c/w home atorvastatin 40mg po qhs

## 2025-01-29 NOTE — PROGRESS NOTE ADULT - ATTENDING COMMENTS
89F with a PMH of HTN, HLD, who presented with high grade fever and chills, admitted for antibiotic treatment of sepsis of unknown etiology.     VS reviewed and stable on RA in last 24 hrs     Exam:   Appears comfortable   MMM  Normal WOB on RA, CTAB   RRR, no mrg   Abdomen soft, nontender, nondistended  Extremities warm and without edema   AOX3, no focal neuro deficits     Labs reviewed   CBC normal, leukocytosis resolved   Na 131 but stable, CRP and procalcitonin both very elevated   LFTs normal   Urine with only 3 WBC   RVP negative   Blood cultures NGTD    Imaging reviewed including CXR, no consolidations but some hilar enlargement present.     A/P:   -Severe sepsis on admission, unclear etiology, fever of unclear origin: continue vancomycin and zosyn for now given patient's labs much improved, plan for CT chest/abdomen/pelvis to search for any infectious sources given negative infectious workup so far and lack of any localizing symptoms. Follow up blood cultures.   -Hyponatremia: appears chronic, at her baseline, likely SIADH.   -HTN: continue to monitor vitals, not currently hypertensive  -HLD: continue home statin     Rest as per resident note.

## 2025-01-29 NOTE — PROGRESS NOTE ADULT - PROBLEM SELECTOR PLAN 1
severe sepsis on admission, 4/4 SIRS (T101.6, , RR 20, WBC 18) with SBP drop >40mmHg in ED. unclear etiology at the moment. infectious w/u thus far negative, white count responsive to abx. will mendez scan with ct chest, ct ap.     plan  - pan scan as above  - c/w vanc, zosyn

## 2025-01-29 NOTE — PROGRESS NOTE ADULT - SUBJECTIVE AND OBJECTIVE BOX
Patient is a 89y old  Female who presents with a chief complaint of sepsis (29 Jan 2025 10:05)      HOSPITAL COURSE:     OVERNIGHT EVENTS:    SUBJECTIVE:     ROS: otherwise negative      T(C): 36.7 (01-29-25 @ 05:30), Max: 37 (01-28-25 @ 20:15)  HR: 91 (01-29-25 @ 05:30) (75 - 93)  BP: 168/76 (01-29-25 @ 05:30) (126/68 - 169/73)  RR: 18 (01-29-25 @ 05:30) (16 - 18)  SpO2: 98% (01-29-25 @ 05:30) (95% - 98%)  Wt(kg): --Vital Signs Last 24 Hrs  T(C): 36.7 (29 Jan 2025 05:30), Max: 37 (28 Jan 2025 20:15)  T(F): 98 (29 Jan 2025 05:30), Max: 98.6 (28 Jan 2025 20:15)  HR: 91 (29 Jan 2025 05:30) (75 - 93)  BP: 168/76 (29 Jan 2025 05:30) (126/68 - 169/73)  BP(mean): --  RR: 18 (29 Jan 2025 05:30) (16 - 18)  SpO2: 98% (29 Jan 2025 05:30) (95% - 98%)    Parameters below as of 29 Jan 2025 05:30  Patient On (Oxygen Delivery Method): room air        PHYSICAL EXAM:  Constitutional: resting comfortably in bed; NAD  Head: NC/AT  Eyes: PERRL, EOMI, anicteric sclera  ENT: no nasal discharge; MMM  Neck: supple; no JVD or thyromegaly  Respiratory: CTA B/L; no W/R/R, no retractions  Cardiac: +S1/S2; RRR; no M/R/G  Gastrointestinal: soft, NT/ND; no rebound or guarding; +BSx4  Back: spine midline, no bony tenderness or step-offs; no CVAT B/L  Extremities: WWP, no clubbing or cyanosis; no peripheral edema. Capillary refill <2 sec  Musculoskeletal: NROM x4; no joint swelling, tenderness or erythema  Vascular: 2+ radial, DP/PT pulses B/L  Dermatologic: skin warm, dry and intact; no rashes, wounds, or scars  Lymphatic: no submandibular or cervical LAD  Neurologic: AAOx3; CNII-XII grossly intact; no focal deficits  Psychiatric: affect and characteristics of appearance, verbalizations, behaviors are appropriate    LABS:                        11.7   9.15  )-----------( 196      ( 29 Jan 2025 07:25 )             34.9     01-29    131[L]  |  100  |  8   ----------------------------<  97  3.9   |  23  |  0.52    Ca    8.8      29 Jan 2025 07:25  Phos  2.5     01-29  Mg     2.0     01-29    TPro  6.2  /  Alb  3.7  /  TBili  0.6  /  DBili  x   /  AST  33  /  ALT  24  /  AlkPhos  97  01-28      PT/INR - ( 27 Jan 2025 22:02 )   PT: 12.2 sec;   INR: 1.04          PTT - ( 27 Jan 2025 22:02 )  PTT:31.7 sec  Urinalysis Basic - ( 29 Jan 2025 07:25 )    Color: x / Appearance: x / SG: x / pH: x  Gluc: 97 mg/dL / Ketone: x  / Bili: x / Urobili: x   Blood: x / Protein: x / Nitrite: x   Leuk Esterase: x / RBC: x / WBC x   Sq Epi: x / Non Sq Epi: x / Bacteria: x      CAPILLARY BLOOD GLUCOSE            Urinalysis Basic - ( 29 Jan 2025 07:25 )    Color: x / Appearance: x / SG: x / pH: x  Gluc: 97 mg/dL / Ketone: x  / Bili: x / Urobili: x   Blood: x / Protein: x / Nitrite: x   Leuk Esterase: x / RBC: x / WBC x   Sq Epi: x / Non Sq Epi: x / Bacteria: x        MEDICATIONS  (STANDING):  aspirin enteric coated 81 milliGRAM(s) Oral daily  atorvastatin 40 milliGRAM(s) Oral at bedtime  enoxaparin Injectable 30 milliGRAM(s) SubCutaneous every 24 hours  piperacillin/tazobactam IVPB.. 3.375 Gram(s) IV Intermittent every 8 hours    MEDICATIONS  (PRN):  acetaminophen     Tablet .. 650 milliGRAM(s) Oral every 6 hours PRN Temp greater or equal to 38C (100.4F), Mild Pain (1 - 3)  melatonin 3 milliGRAM(s) Oral at bedtime PRN Insomnia  ondansetron Injectable 4 milliGRAM(s) IV Push every 8 hours PRN Nausea and/or Vomiting      RADIOLOGY & ADDITIONAL TESTS: Reviewed   Patient is a 89y old  Female who presents with a chief complaint of sepsis (29 Jan 2025 10:05)    OVERNIGHT EVENTS: shagufta    SUBJECTIVE: seen bedside this am. patient feels great today, remarks that she feels like she is back to normal. no pain, no clinical compaints. discussed that we are working up the source of her white count and infection.     ROS: otherwise negative      T(C): 36.7 (01-29-25 @ 05:30), Max: 37 (01-28-25 @ 20:15)  HR: 91 (01-29-25 @ 05:30) (75 - 93)  BP: 168/76 (01-29-25 @ 05:30) (126/68 - 169/73)  RR: 18 (01-29-25 @ 05:30) (16 - 18)  SpO2: 98% (01-29-25 @ 05:30) (95% - 98%)  Wt(kg): --Vital Signs Last 24 Hrs  T(C): 36.7 (29 Jan 2025 05:30), Max: 37 (28 Jan 2025 20:15)  T(F): 98 (29 Jan 2025 05:30), Max: 98.6 (28 Jan 2025 20:15)  HR: 91 (29 Jan 2025 05:30) (75 - 93)  BP: 168/76 (29 Jan 2025 05:30) (126/68 - 169/73)  BP(mean): --  RR: 18 (29 Jan 2025 05:30) (16 - 18)  SpO2: 98% (29 Jan 2025 05:30) (95% - 98%)    Parameters below as of 29 Jan 2025 05:30  Patient On (Oxygen Delivery Method): room air        PHYSICAL EXAM:  Constitutional: resting comfortably in bed; NAD  Head: NC/AT  Eyes: PERRL, EOMI, anicteric sclera  ENT: no nasal discharge; MMM  Neck: supple; no JVD or thyromegaly  Respiratory: CTA B/L; no W/R/R, no retractions  Cardiac: +S1/S2; RRR; no M/R/G  Gastrointestinal: soft, NT/ND; no rebound or guarding; +BSx4  Extremities: WWP, no clubbing or cyanosis; no peripheral edema. Capillary refill <2 sec  Musculoskeletal: NROM x4; no joint swelling, tenderness or erythema  Vascular: 2+ radial, DP/PT pulses B/L  Dermatologic: skin warm, dry and intact; no rashes, wounds, or scars  Neurologic: AAOx3    LABS:                        11.7   9.15  )-----------( 196      ( 29 Jan 2025 07:25 )             34.9     01-29    131[L]  |  100  |  8   ----------------------------<  97  3.9   |  23  |  0.52    Ca    8.8      29 Jan 2025 07:25  Phos  2.5     01-29  Mg     2.0     01-29    TPro  6.2  /  Alb  3.7  /  TBili  0.6  /  DBili  x   /  AST  33  /  ALT  24  /  AlkPhos  97  01-28      PT/INR - ( 27 Jan 2025 22:02 )   PT: 12.2 sec;   INR: 1.04          PTT - ( 27 Jan 2025 22:02 )  PTT:31.7 sec  Urinalysis Basic - ( 29 Jan 2025 07:25 )    Color: x / Appearance: x / SG: x / pH: x  Gluc: 97 mg/dL / Ketone: x  / Bili: x / Urobili: x   Blood: x / Protein: x / Nitrite: x   Leuk Esterase: x / RBC: x / WBC x   Sq Epi: x / Non Sq Epi: x / Bacteria: x      CAPILLARY BLOOD GLUCOSE            Urinalysis Basic - ( 29 Jan 2025 07:25 )    Color: x / Appearance: x / SG: x / pH: x  Gluc: 97 mg/dL / Ketone: x  / Bili: x / Urobili: x   Blood: x / Protein: x / Nitrite: x   Leuk Esterase: x / RBC: x / WBC x   Sq Epi: x / Non Sq Epi: x / Bacteria: x        MEDICATIONS  (STANDING):  aspirin enteric coated 81 milliGRAM(s) Oral daily  atorvastatin 40 milliGRAM(s) Oral at bedtime  enoxaparin Injectable 30 milliGRAM(s) SubCutaneous every 24 hours  piperacillin/tazobactam IVPB.. 3.375 Gram(s) IV Intermittent every 8 hours    MEDICATIONS  (PRN):  acetaminophen     Tablet .. 650 milliGRAM(s) Oral every 6 hours PRN Temp greater or equal to 38C (100.4F), Mild Pain (1 - 3)  melatonin 3 milliGRAM(s) Oral at bedtime PRN Insomnia  ondansetron Injectable 4 milliGRAM(s) IV Push every 8 hours PRN Nausea and/or Vomiting      RADIOLOGY & ADDITIONAL TESTS: Reviewed

## 2025-01-29 NOTE — PHYSICAL THERAPY INITIAL EVALUATION ADULT - GAIT DEVIATIONS NOTED, PT EVAL
decreased shy/increased time in double stance/decreased step length/decreased weight-shifting ability

## 2025-01-29 NOTE — CONSULT NOTE ADULT - SUBJECTIVE AND OBJECTIVE BOX
Patient is a 89y old  Female who presents with a chief complaint of sepsis (28 Jan 2025 13:31)      HPI:  89yoF with a PMHx of HTN, HLD presents for fever and chills. Patient reports she was in her usual state of health until day of admission when she had an episode of fevers and shaking chills. Denies cough, sore throat, congestion, N/V/D, dysuria. Reports her grandson lives with her and recently had the flu. She received her flu shot this year. No recent travel.     In the ED  Vitals: T 101.6,  > 71, /81 > 93/52, RR 20 94% on RA  Labs: WBC 18.33, Na 129 (around baseline)  UA negative  RVP negative  EKG: Sinus tach w/ LVH  Int: vanc, zosyn, 1.5L LR (28 Jan 2025 02:06)    PAST MEDICAL & SURGICAL HISTORY:  HTN (hypertension)      No significant past surgical history        MEDICATIONS  (STANDING):  aspirin enteric coated 81 milliGRAM(s) Oral daily  atorvastatin 40 milliGRAM(s) Oral at bedtime  enoxaparin Injectable 30 milliGRAM(s) SubCutaneous every 24 hours  piperacillin/tazobactam IVPB.. 3.375 Gram(s) IV Intermittent every 8 hours    MEDICATIONS  (PRN):  acetaminophen     Tablet .. 650 milliGRAM(s) Oral every 6 hours PRN Temp greater or equal to 38C (100.4F), Mild Pain (1 - 3)  melatonin 3 milliGRAM(s) Oral at bedtime PRN Insomnia  ondansetron Injectable 4 milliGRAM(s) IV Push every 8 hours PRN Nausea and/or Vomiting        FAMILY HISTORY:      CBC Full  -  ( 29 Jan 2025 07:25 )  WBC Count : 9.15 K/uL  RBC Count : 3.95 M/uL  Hemoglobin : 11.7 g/dL  Hematocrit : 34.9 %  Platelet Count - Automated : 196 K/uL  Mean Cell Volume : 88.4 fl  Mean Cell Hemoglobin : 29.6 pg  Mean Cell Hemoglobin Concentration : 33.5 g/dL  Auto Neutrophil # : 7.04 K/uL  Auto Lymphocyte # : 1.06 K/uL  Auto Monocyte # : 0.87 K/uL  Auto Eosinophil # : 0.08 K/uL  Auto Basophil # : 0.04 K/uL  Auto Neutrophil % : 76.9 %  Auto Lymphocyte % : 11.6 %  Auto Monocyte % : 9.5 %  Auto Eosinophil % : 0.9 %  Auto Basophil % : 0.4 %      01-29    131[L]  |  100  |  8   ----------------------------<  97  3.9   |  23  |  0.52    Ca    8.8      29 Jan 2025 07:25  Phos  2.5     01-29  Mg     2.0     01-29    TPro  6.2  /  Alb  3.7  /  TBili  0.6  /  DBili  x   /  AST  33  /  ALT  24  /  AlkPhos  97  01-28      Urinalysis Basic - ( 29 Jan 2025 07:25 )    Color: x / Appearance: x / SG: x / pH: x  Gluc: 97 mg/dL / Ketone: x  / Bili: x / Urobili: x   Blood: x / Protein: x / Nitrite: x   Leuk Esterase: x / RBC: x / WBC x   Sq Epi: x / Non Sq Epi: x / Bacteria: x        Radiology :     < from: Xray Chest 1 View- PORTABLE-Urgent (01.27.25 @ 23:09) >  ACC: [id number] EXAM:  XR CHEST PORTABLE URGENT 1V   ORDERED BY: CASSIDY PEÑALOZA     PROCEDURE DATE:  01/27/2025          INTERPRETATION:  Chest one view    HISTORY: Sepsis    IMPRESSION:    Prominent bilateral hilum may be due to enlarged pulmonary arteries or   adenopathy; if clinically indicated further evaluation may be obtained   with chest CT scan follow-up.    No consolidation pleural effusion or pneumothorax. No acute bone   abnormality.        Review of Systems : per HPI         Vital Signs Last 24 Hrs  T(C): 36.7 (29 Jan 2025 05:30), Max: 37 (28 Jan 2025 20:15)  T(F): 98 (29 Jan 2025 05:30), Max: 98.6 (28 Jan 2025 20:15)  HR: 91 (29 Jan 2025 05:30) (75 - 93)  BP: 168/76 (29 Jan 2025 05:30) (126/68 - 169/73)  BP(mean): --  RR: 18 (29 Jan 2025 05:30) (16 - 18)  SpO2: 98% (29 Jan 2025 05:30) (95% - 98%)    Parameters below as of 29 Jan 2025 05:30  Patient On (Oxygen Delivery Method): room air            Physical Exam:   frail 89 y o woman lying comfortably in semi Lane's position , awake , alert , no acute complaints     Head: normocephalic , atraumatic    Eyes: PERRLA , EOMI , no nystagmus , sclera anicteric    ENT / FACE: neg nasal discharge , uvula midline , no oropharyngeal erythema / exudate    Neck: supple , negative JVD , negative carotid bruits , no thyromegaly    Chest: CTA bilaterally     Cardiovascular: regular rate and rhythm , neg murmurs / rubs / gallops    Abdomen: soft , non distended , no tenderness to palpation in all 4 quadrants ,  normal bowel sounds     Extremities: WWP , neg cyanosis /clubbing / edema     Neurologic Exam:     Alert and oriented x 3      Motor Exam:        > 3+/5 x 4 extremities , without drift      Sensation:         intact to light touch x 4 extremities                              DTR:           biceps/brachioradialis: equal                            patella/ankle: equal        Gait:  not tested         PM&R Impression: admitted for sepsis of unknown origin.     - deconditioned       Recommendations / Plan:       1) Physical / Occupational therapy focusing on therapeutic exercises , equipment evaluation , bed mobility/transfer out of bed evaluation , progressive ambulation with assistive devices prn .    2) Current disposition plan recommendation:    pending functional progress

## 2025-01-29 NOTE — PHYSICAL THERAPY INITIAL EVALUATION ADULT - ADDITIONAL COMMENTS
pt lives w/ her daughter in an elevator access apt building w/ no stairs to enter. Owns both a SC and RW. has a home health aide 3 days a week.

## 2025-01-29 NOTE — PROGRESS NOTE ADULT - PROBLEM SELECTOR PLAN 5
F: s/p 1.5L NS  E: replete prn  D: regular  DVT: lovenox  Code: full  Dispo: [facility]

## 2025-01-29 NOTE — PROGRESS NOTE ADULT - PROBLEM SELECTOR PLAN 5
F: s/p 1.5L NS  E: replete prn  D: regular  DVT: lovenox  Code: full  Dispo: [facility]

## 2025-01-29 NOTE — OCCUPATIONAL THERAPY INITIAL EVALUATION ADULT - ADDITIONAL COMMENTS
Pt r handed and does not wear glasses. Pt lives with daughter in apartment with 0 [address] + elevator. Pt goes to OP OT, ambulates with SC, and has private aid who assists with IADL 3x/wk.

## 2025-01-29 NOTE — OCCUPATIONAL THERAPY INITIAL EVALUATION ADULT - DIAGNOSIS, OT EVAL
Pt presents to [facility] with sepsis. OT consulted 2/2 decreased endurance and balance impacting ADL and functional mob.

## 2025-01-29 NOTE — CONSULT NOTE ADULT - ASSESSMENT
{\rtf1\ypoqag05439\ansi\vifytmm0811\ftnbj\uc1\deff0  {\fonttbl{\f0 \fnil Segoe UI;}{\f1 \fnil \fcharset0 Segoe UI;}{\f2 \fnil Times New Hipolito;}}  {\colortbl ;\mhh501\udvlc373\fcnn091 ;\red0\green0\blue0 ;\red0\green0\thfs177 ;\red0\green0\blue0 ;}  {\stylesheet{\f0\fs20 Normal;}{\cs1 Default Paragraph Font;}{\cs2\f0\fs16 Line Number;}{\cs3\f2\fs24\ul\cf3 Hyperlink;}}  {\*\revtbl{Unknown;}}  \uwvgng23152\vwdjml21455\gpkls3198\mvmnq8603\crtke4994\kozaq4502\mxaopnr333\oazbwkh888\nogrowautofit\ulrffs795\formshade\nofeaturethrottle1\dntblnsbdb\fet4\aendnotes\aftnnrlc\pgbrdrhead\pgbrdrfoot  \sectd\czrvor88929\ydzrrn09878\guttersxn0\fjdcfilq0628\yrswvnna6355\dykspbmy6450\lrwhfhem3371\dahppdb317\qdcoims648\sbkpage\pgncont\pgndec  \plain\plain\f0\fs24\ql\plain\f0\fs24\plain\f0\fs20\daxs5266\hich\f0\dbch\f0\loch\f0\fs20\par  I M\par  \par  89 y o F with a PMHx of HTN, HLD presents for fever and chills, admitted for sepsis of unknown origin. \par  \par  \plain\f1\fs20\ulqb3917\hich\f1\dbch\f1\loch\f1\cf2\fs20\ul{\field{\*\fldinst HYPERLINK [id number] }{\fldrslt Problem/Plan - 1:}}\plain\f0\fs20\nvxa2259\hich\f0\dbch\f0\loch\f0\fs20\ql\par  \'b7  {\*\bkmkstart [id number]}{\*\bkmkend fe78925354834}Problem: {\*\bkmkstart fq30897446341}{\*\bkmkend pz17969729172}Severe sepsis.\plain\f1\fs20\fbny8021\hich\f1\dbch\f1\loch\f1\cf2\fs20\strike\plain\f0\fs20\jxwm2095\hich\f0\dbch\f0\loch\f0\fs20\par  \'b7  {\*\bkmkstart [id number]}{\*\bkmkend uj59857076163}Plan: {\*\bkmkstart yn95864234482}{\*\bkmkend lx41691699257}On admission, pt meeting 4/4 SIRS (T101.6, , RR 20, WBC 18) with SBP drop >40mmHg in ED. Had episode of rigors the day of admission   but no other localizing signs of infection. Grandson she lives with recently had the flu. UA negative, CXR clear. RVP negative. Suspect may be 2/2 viral infection not picked up on RVP. No c/f meningitis - no headache or meningeal signs. No skin lesions. \par  - s/p 1.5L NS in ED\par  - c/w vanc, zosyn \par  - f/u blood cxs.\plain\f1\fs20\sfuf9079\hich\f1\dbch\f1\loch\f1\cf2\fs20\strike\plain\f0\fs20\vvup3225\hich\f0\dbch\f0\loch\f0\fs20\par  \par  \plain\f1\fs20\dypb1086\hich\f1\dbch\f1\loch\f1\cf2\fs20\ul{\field{\*\fldinst HYPERLINK [id number] }{\fldrslt Problem/Plan - 2:}}\plain\f0\fs20\qniv7860\hich\f0\dbch\f0\loch\f0\fs20\ql\par  \'b7  {\*\bkmkstart iy14006829628}{\*\bkmkend cc50742303241}Problem: {\*\bkmkstart gu04602246509}{\*\bkmkend zx08796949240}Hyponatremia. \par  \'b7  {\*\bkmkstart br16642587037}{\*\bkmkend yn42302121286}Plan: {\*\bkmkstart se58731393279}{\*\bkmkend vj45142632825}Na 129 on admission. Of note, pt was here a few months ago for acute on chronic hyponatremia which was attributed to 2/2 HCTZ which   was discontinued. Baseline Na appears to be ~128-130. Mentating well. \par  - f/u serum osm\par  - f/u urine studies.\par  \par  \plain\f1\fs20\jzyh4754\hich\f1\dbch\f1\loch\f1\cf2\fs20\ul{\field{\*\fldinst HYPERLINK [id number] }{\fldrslt Problem/Plan - 3:}}\plain\f0\fs20\ctig0821\hich\f0\dbch\f0\loch\f0\fs20\ql\par  \'b7  {\*\bkmkstart yd86736344470}{\*\bkmkend pj45564148035}Problem: {\*\bkmkstart ak00539047161}{\*\bkmkend jo49880495618}HTN (hypertension). \par  \'b7  {\*\bkmkstart hn54906582587}{\*\bkmkend po76800482595}Plan: {\*\bkmkstart sl54266720664}{\*\bkmkend uo90896263978}Not on home meds. Initially hypertensive in ED >> borderline hypotensive, likely 2/2 sepsis. \par  - no interventions at this time.\par  \par  \plain\f1\fs20\uxue1532\hich\f1\dbch\f1\loch\f1\cf2\fs20\ul{\field{\*\fldinst HYPERLINK [id number] }{\fldrslt Problem/Plan - 4:}}\plain\f0\fs20\nzjc1123\hich\f0\dbch\f0\loch\f0\fs20\ql\par  \'b7  {\*\bkmkstart uk72995184118}{\*\bkmkend li88258083149}Problem: {\*\bkmkstart re13384462760}{\*\bkmkend qh66542394554}HLD (hyperlipidemia). \par  \'b7  {\*\bkmkstart zn15889619955}{\*\bkmkend nc79542416980}Plan: {\*\bkmkstart ld48982683044}{\*\bkmkend yd08357047509}- c/w home atorvastatin 40mg po qhs.\par  \par  \plain\f1\fs20\fwzj2449\hich\f1\dbch\f1\loch\f1\cf2\fs20\ul{\field{\*\fldinst HYPERLINK [id number] }{\fldrslt Problem/Plan - 5:}}\plain\f0\fs20\yqec8693\hich\f0\dbch\f0\loch\f0\fs20\ql\par  \'b7  {\*\bkmkstart em87667421092}{\*\bkmkend wh84367287297}Problem: {\*\bkmkstart ap61290685536}{\*\bkmkend vj61160515939}Prophylactic measure. \par  \'b7  {\*\bkmkstart ek99300129049}{\*\bkmkend wf12307859801}Plan: {\*\bkmkstart gg74214344142}{\*\bkmkend ka95848069506}F: s/p 1.5L NS\par  E: replete prn\par  D: regular\par  DVT: lovenox\par  Code: full\par  Dispo: [facility].\par  \par  }

## 2025-01-29 NOTE — PROGRESS NOTE ADULT - PROBLEM SELECTOR PLAN 2
Na 129 on admission. Of note, pt was here a few months ago for acute on chronic hyponatremia which was attributed to 2/2 HCTZ which was discontinued. Baseline Na appears to be ~128-130. Mentating well.   Urine Osm 286, Aleksey 43, UCr 26  Repeat Na 132 (1/28)  - f/u serum osm
Na 129 on admission. Of note, pt was here a few months ago for acute on chronic hyponatremia which was attributed to 2/2 HCTZ which was discontinued. Baseline Na appears to be ~128-130. Mentating well.   Urine Osm 286, Aleksey 43, UCr 26  Repeat Na 132 (1/28)  - f/u serum osm
Na 129 on admission. Of note, pt was here a few months ago for acute on chronic hyponatremia which was attributed to 2/2 HCTZ which was discontinued. Baseline Na appears to be ~128-130. Mentating well.   - likely siadh per urine lytes. TSH, cortisol normal recently per chart review. unclear etiology, but low Na appears chronic vs maybe underlying infectious/inflammatory process contributing    plan  - ctm

## 2025-01-29 NOTE — PROGRESS NOTE ADULT - SUBJECTIVE AND OBJECTIVE BOX
hospital course    89yoF with a PMHx of HTN, HLD presents for fever and chills, admitted for severe sepsis of unknown origin. (4/4 SIRS (T101.6, , RR 20, WBC 18) with SBP drop > 40mmHg), infectious workup negative and Acute on chronic hyponatremia responsive to fluids Patient fevers subsided, pending negative BC and possible pan scan if fevers persist, and final pt recs.     VITAL SIGNS:  Vital Signs Last 24 Hrs  T(C): 36.7 (29 Jan 2025 05:30), Max: 37 (28 Jan 2025 20:15)  T(F): 98 (29 Jan 2025 05:30), Max: 98.6 (28 Jan 2025 20:15)  HR: 91 (29 Jan 2025 05:30) (75 - 93)  BP: 168/76 (29 Jan 2025 05:30) (126/68 - 169/73)  BP(mean): --  RR: 18 (29 Jan 2025 05:30) (16 - 18)  SpO2: 98% (29 Jan 2025 05:30) (95% - 98%)    Parameters below as of 29 Jan 2025 05:30  Patient On (Oxygen Delivery Method): room air      I&O's Summary      PHYSICAL EXAM:    General: WDWN  HEENT: NC/AT; PERRL, anicteric sclera; MMM  Neck: supple  Cardiovascular: +S1/S2; RRR  Respiratory: CTA B/L; no W/R/R  Gastrointestinal: soft, NT/ND; +BSx4  Extremities: WWP; no edema, clubbing or cyanosis  Vascular: 2+ radial, DP/PT pulses B/L  Neurological: AAOx3; no focal deficits    MEDICATIONS:  MEDICATIONS  (STANDING):  aspirin enteric coated 81 milliGRAM(s) Oral daily  atorvastatin 40 milliGRAM(s) Oral at bedtime  enoxaparin Injectable 30 milliGRAM(s) SubCutaneous every 24 hours  piperacillin/tazobactam IVPB.. 3.375 Gram(s) IV Intermittent every 8 hours    MEDICATIONS  (PRN):  acetaminophen     Tablet .. 650 milliGRAM(s) Oral every 6 hours PRN Temp greater or equal to 38C (100.4F), Mild Pain (1 - 3)  melatonin 3 milliGRAM(s) Oral at bedtime PRN Insomnia  ondansetron Injectable 4 milliGRAM(s) IV Push every 8 hours PRN Nausea and/or Vomiting      ALLERGIES:  Allergies    No Known Allergies    Intolerances        LABS:                        11.7   9.15  )-----------( 196      ( 29 Jan 2025 07:25 )             34.9     01-29    131[L]  |  100  |  8   ----------------------------<  97  3.9   |  23  |  0.52    Ca    8.8      29 Jan 2025 07:25  Phos  2.5     01-29  Mg     2.0     01-29    TPro  6.2  /  Alb  3.7  /  TBili  0.6  /  DBili  x   /  AST  33  /  ALT  24  /  AlkPhos  97  01-28    PT/INR - ( 27 Jan 2025 22:02 )   PT: 12.2 sec;   INR: 1.04          PTT - ( 27 Jan 2025 22:02 )  PTT:31.7 sec  Urinalysis Basic - ( 29 Jan 2025 07:25 )    Color: x / Appearance: x / SG: x / pH: x  Gluc: 97 mg/dL / Ketone: x  / Bili: x / Urobili: x   Blood: x / Protein: x / Nitrite: x   Leuk Esterase: x / RBC: x / WBC x   Sq Epi: x / Non Sq Epi: x / Bacteria: x      CAPILLARY BLOOD GLUCOSE          RADIOLOGY & ADDITIONAL TESTS: Reviewed.

## 2025-01-29 NOTE — OCCUPATIONAL THERAPY INITIAL EVALUATION ADULT - SITTING BALANCE: DYNAMIC
Please go to lab.    I refilled all of your medication for the year.    Please get your colonoscopy done.          Thank you for choosing [facility].  You may be receiving a survey in the mail from Behzad Murphy regarding your visit today.  Please take a few minutes to complete and return the survey to let us know how we are doing.      If you have questions or concerns, please contact us via [place] or you can contact your care team at [phone].    Our Clinic hours are:  Monday 6:40 am  to 7:00 pm  Tuesday -Friday 6:40 am to 5:00 pm    The [place] outpatient lab hours are:  Monday - Friday 6:10 am to 4:45 pm  Saturdays 7:00 am to 11:00 am  Appointments are required, call [phone]    If you have clinical questions after hours or would like to schedule an appointment,  call the clinic at [phone].    Preventive Health Recommendations  Male Ages 50   64    Yearly exam:             See your health care provider every year in order to  o   Review health changes.   o   Discuss preventive care.    o   Review your medicines if your doctor has prescribed any.     Have a cholesterol test every 5 years, or more frequently if you are at risk for high cholesterol/heart disease.     Have a diabetes test (fasting glucose) every three years. If you are at risk for diabetes, you should have this test more often.     Have a colonoscopy at age 50, or have a yearly FIT test (stool test). These exams will check for colon cancer.      Talk with your health care provider about whether or not a prostate cancer screening test (PSA) is right for you.    You should be tested each year for STDs (sexually transmitted diseases), if you re at risk.     Shots: Get a flu shot each year. Get a tetanus shot every 10 years.     Nutrition:    Eat at least 5 servings of fruits and vegetables daily.     Eat whole-grain bread, whole-wheat pasta and brown rice instead of white grains and rice.     Talk to your provider about Calcium and  Vitamin D.     Lifestyle    Exercise for at least 150 minutes a week (30 minutes a day, 5 days a week). This will help you control your weight and prevent disease.     Limit alcohol to one drink per day.     No smoking.     Wear sunscreen to prevent skin cancer.     See your dentist every six months for an exam and cleaning.     See your eye doctor every 1 to 2 years.     good balance

## 2025-01-29 NOTE — PROGRESS NOTE ADULT - PROBLEM SELECTOR PLAN 1
On admission, pt meeting 4/4 SIRS (T101.6, , RR 20, WBC 18) with SBP drop > 40mmHg in ED. Had episode of rigors the day of admission but no other localizing signs of infection. Grandson she lives with recently had the flu. UA negative, CXR clear. RVP negative. Suspect may be 2/2 viral infection not picked up on RVP vs bacteremia (elv procal, CRP). No c/f meningitis - endorses HA but no meningeal signs. No skin lesions.   - s/p 1.5L NS in ED  - c/w vanc, zosyn   - f/u Bcx, Ucx  - Consider echo if Bcx positive  - If febrile again on abx, will order CT Chest/A/P

## 2025-01-30 ENCOUNTER — TRANSCRIPTION ENCOUNTER (OUTPATIENT)
Age: 89
End: 2025-01-30

## 2025-01-30 VITALS
DIASTOLIC BLOOD PRESSURE: 83 MMHG | TEMPERATURE: 98 F | OXYGEN SATURATION: 98 % | SYSTOLIC BLOOD PRESSURE: 162 MMHG | RESPIRATION RATE: 17 BRPM | HEART RATE: 82 BPM

## 2025-01-30 LAB
ADD ON TEST-SPECIMEN IN LAB: SIGNIFICANT CHANGE UP
ANION GAP SERPL CALC-SCNC: 9 MMOL/L — SIGNIFICANT CHANGE UP (ref 5–17)
BASOPHILS # BLD AUTO: 0.03 K/UL — SIGNIFICANT CHANGE UP (ref 0–0.2)
BASOPHILS NFR BLD AUTO: 0.6 % — SIGNIFICANT CHANGE UP (ref 0–2)
BUN SERPL-MCNC: 8 MG/DL — SIGNIFICANT CHANGE UP (ref 7–23)
CALCIUM SERPL-MCNC: 8.9 MG/DL — SIGNIFICANT CHANGE UP (ref 8.4–10.5)
CHLORIDE SERPL-SCNC: 101 MMOL/L — SIGNIFICANT CHANGE UP (ref 96–108)
CO2 SERPL-SCNC: 24 MMOL/L — SIGNIFICANT CHANGE UP (ref 22–31)
CREAT SERPL-MCNC: 0.56 MG/DL — SIGNIFICANT CHANGE UP (ref 0.5–1.3)
EGFR: 87 ML/MIN/1.73M2 — SIGNIFICANT CHANGE UP
EOSINOPHIL # BLD AUTO: 0.18 K/UL — SIGNIFICANT CHANGE UP (ref 0–0.5)
EOSINOPHIL NFR BLD AUTO: 3.8 % — SIGNIFICANT CHANGE UP (ref 0–6)
GLUCOSE SERPL-MCNC: 98 MG/DL — SIGNIFICANT CHANGE UP (ref 70–99)
HCT VFR BLD CALC: 36.5 % — SIGNIFICANT CHANGE UP (ref 34.5–45)
HGB BLD-MCNC: 12.4 G/DL — SIGNIFICANT CHANGE UP (ref 11.5–15.5)
IMM GRANULOCYTES NFR BLD AUTO: 0.6 % — SIGNIFICANT CHANGE UP (ref 0–0.9)
LYMPHOCYTES # BLD AUTO: 1.3 K/UL — SIGNIFICANT CHANGE UP (ref 1–3.3)
LYMPHOCYTES # BLD AUTO: 27.4 % — SIGNIFICANT CHANGE UP (ref 13–44)
MAGNESIUM SERPL-MCNC: 2 MG/DL — SIGNIFICANT CHANGE UP (ref 1.6–2.6)
MCHC RBC-ENTMCNC: 29.9 PG — SIGNIFICANT CHANGE UP (ref 27–34)
MCHC RBC-ENTMCNC: 34 G/DL — SIGNIFICANT CHANGE UP (ref 32–36)
MCV RBC AUTO: 88 FL — SIGNIFICANT CHANGE UP (ref 80–100)
MONOCYTES # BLD AUTO: 0.62 K/UL — SIGNIFICANT CHANGE UP (ref 0–0.9)
MONOCYTES NFR BLD AUTO: 13.1 % — SIGNIFICANT CHANGE UP (ref 2–14)
MRSA PCR RESULT.: NEGATIVE — SIGNIFICANT CHANGE UP
NEUTROPHILS # BLD AUTO: 2.59 K/UL — SIGNIFICANT CHANGE UP (ref 1.8–7.4)
NEUTROPHILS NFR BLD AUTO: 54.5 % — SIGNIFICANT CHANGE UP (ref 43–77)
NRBC # BLD: 0 /100 WBCS — SIGNIFICANT CHANGE UP (ref 0–0)
NRBC BLD-RTO: 0 /100 WBCS — SIGNIFICANT CHANGE UP (ref 0–0)
PHOSPHATE SERPL-MCNC: 2.9 MG/DL — SIGNIFICANT CHANGE UP (ref 2.5–4.5)
PLATELET # BLD AUTO: 203 K/UL — SIGNIFICANT CHANGE UP (ref 150–400)
POTASSIUM SERPL-MCNC: 3.8 MMOL/L — SIGNIFICANT CHANGE UP (ref 3.5–5.3)
POTASSIUM SERPL-SCNC: 3.8 MMOL/L — SIGNIFICANT CHANGE UP (ref 3.5–5.3)
RBC # BLD: 4.15 M/UL — SIGNIFICANT CHANGE UP (ref 3.8–5.2)
RBC # FLD: 14.5 % — SIGNIFICANT CHANGE UP (ref 10.3–14.5)
S AUREUS DNA NOSE QL NAA+PROBE: NEGATIVE — SIGNIFICANT CHANGE UP
SODIUM SERPL-SCNC: 134 MMOL/L — LOW (ref 135–145)
WBC # BLD: 4.75 K/UL — SIGNIFICANT CHANGE UP (ref 3.8–10.5)
WBC # FLD AUTO: 4.75 K/UL — SIGNIFICANT CHANGE UP (ref 3.8–10.5)

## 2025-01-30 PROCEDURE — 96368 THER/DIAG CONCURRENT INF: CPT

## 2025-01-30 PROCEDURE — 99291 CRITICAL CARE FIRST HOUR: CPT | Mod: 25

## 2025-01-30 PROCEDURE — 71260 CT THORAX DX C+: CPT | Mod: MC

## 2025-01-30 PROCEDURE — 97165 OT EVAL LOW COMPLEX 30 MIN: CPT

## 2025-01-30 PROCEDURE — 83605 ASSAY OF LACTIC ACID: CPT

## 2025-01-30 PROCEDURE — 99239 HOSP IP/OBS DSCHRG MGMT >30: CPT

## 2025-01-30 PROCEDURE — 71045 X-RAY EXAM CHEST 1 VIEW: CPT

## 2025-01-30 PROCEDURE — 93005 ELECTROCARDIOGRAM TRACING: CPT

## 2025-01-30 PROCEDURE — 80053 COMPREHEN METABOLIC PANEL: CPT

## 2025-01-30 PROCEDURE — 0225U NFCT DS DNA&RNA 21 SARSCOV2: CPT

## 2025-01-30 PROCEDURE — 85730 THROMBOPLASTIN TIME PARTIAL: CPT

## 2025-01-30 PROCEDURE — 96365 THER/PROPH/DIAG IV INF INIT: CPT

## 2025-01-30 PROCEDURE — 85610 PROTHROMBIN TIME: CPT

## 2025-01-30 PROCEDURE — 96366 THER/PROPH/DIAG IV INF ADDON: CPT

## 2025-01-30 PROCEDURE — 85652 RBC SED RATE AUTOMATED: CPT

## 2025-01-30 PROCEDURE — 74177 CT ABD & PELVIS W/CONTRAST: CPT | Mod: MC

## 2025-01-30 PROCEDURE — 84145 PROCALCITONIN (PCT): CPT

## 2025-01-30 PROCEDURE — 80202 ASSAY OF VANCOMYCIN: CPT

## 2025-01-30 PROCEDURE — 87040 BLOOD CULTURE FOR BACTERIA: CPT

## 2025-01-30 PROCEDURE — 80048 BASIC METABOLIC PNL TOTAL CA: CPT

## 2025-01-30 PROCEDURE — 83930 ASSAY OF BLOOD OSMOLALITY: CPT

## 2025-01-30 PROCEDURE — 86140 C-REACTIVE PROTEIN: CPT

## 2025-01-30 PROCEDURE — 82570 ASSAY OF URINE CREATININE: CPT

## 2025-01-30 PROCEDURE — 87640 STAPH A DNA AMP PROBE: CPT

## 2025-01-30 PROCEDURE — 83935 ASSAY OF URINE OSMOLALITY: CPT

## 2025-01-30 PROCEDURE — 96361 HYDRATE IV INFUSION ADD-ON: CPT

## 2025-01-30 PROCEDURE — 36415 COLL VENOUS BLD VENIPUNCTURE: CPT

## 2025-01-30 PROCEDURE — 83735 ASSAY OF MAGNESIUM: CPT

## 2025-01-30 PROCEDURE — 84300 ASSAY OF URINE SODIUM: CPT

## 2025-01-30 PROCEDURE — 85025 COMPLETE CBC W/AUTO DIFF WBC: CPT

## 2025-01-30 PROCEDURE — 97161 PT EVAL LOW COMPLEX 20 MIN: CPT

## 2025-01-30 PROCEDURE — 87637 SARSCOV2&INF A&B&RSV AMP PRB: CPT

## 2025-01-30 PROCEDURE — 87086 URINE CULTURE/COLONY COUNT: CPT

## 2025-01-30 PROCEDURE — 87077 CULTURE AEROBIC IDENTIFY: CPT

## 2025-01-30 PROCEDURE — 84100 ASSAY OF PHOSPHORUS: CPT

## 2025-01-30 PROCEDURE — 81001 URINALYSIS AUTO W/SCOPE: CPT

## 2025-01-30 PROCEDURE — 87641 MR-STAPH DNA AMP PROBE: CPT

## 2025-01-30 RX ORDER — LEVOFLOXACIN 500 MG/1
1 TABLET, FILM COATED ORAL
Qty: 3 | Refills: 0
Start: 2025-01-30 | End: 2025-02-01

## 2025-01-30 RX ORDER — POLYETHYLENE GLYCOL 3350 17 G/17G
17 POWDER, FOR SOLUTION ORAL EVERY 12 HOURS
Refills: 0 | Status: DISCONTINUED | OUTPATIENT
Start: 2025-01-30 | End: 2025-01-30

## 2025-01-30 RX ADMIN — POLYETHYLENE GLYCOL 3350 17 GRAM(S): 17 POWDER, FOR SOLUTION ORAL at 07:55

## 2025-01-30 RX ADMIN — ASPIRIN 81 MILLIGRAM(S): 81 TABLET, COATED ORAL at 11:33

## 2025-01-30 RX ADMIN — VANCOMYCIN HYDROCHLORIDE 250 MILLIGRAM(S): KIT at 11:32

## 2025-01-30 RX ADMIN — PIPERACILLIN SODIUM AND TAZOBACTAM SODIUM 25 GRAM(S): 2; 250 INJECTION, POWDER, FOR SOLUTION INTRAVENOUS at 06:17

## 2025-01-30 NOTE — DISCHARGE NOTE PROVIDER - NSDCCPTREATMENT_GEN_ALL_CORE_FT
PRINCIPAL PROCEDURE  Procedure: CT chest, with contrast  Findings and Treatment: No acute pulmonary embolism. Signs of pulmonary arterial hypertension  Bronchiectasis in the middle lobe and lateral inguinal segment. Branching   tree-in-bud and nodular peripheral opacities in the middle lobe and left   lower lobe suggest mucoid impactions and infectious or inflammatory   peripheral airway disease and bronchiolitis.  Mosaic attenuation and air trapping raises concern for underlying small   airway and/or small vessel disease.  No acute CT morphologic evidence of an infectious or inflammatory focus   in the abdomen or pelvis.  Large stool burden suggesting chronic constipation. Correlate with   clinical parameters.

## 2025-01-30 NOTE — DISCHARGE NOTE NURSING/CASE MANAGEMENT/SOCIAL WORK - NSDCVIVACCINE_GEN_ALL_CORE_FT
Tdap; 19-Feb-2019 13:55; Madie Del Cid (RADHA); [employer]; [id number] (Exp. Date: 11-Feb-2020); IntraMuscular; Deltoid Right.; 0.5 milliLiter(s); VIS (VIS Published: 09-May-2013, VIS Presented: 19-Feb-2019);

## 2025-01-30 NOTE — DISCHARGE NOTE PROVIDER - NSDCFUADDAPPT_GEN_ALL_CORE_FT
(1) Please follow up with your Primary Care Provider, Dr. Timothy Guthrie at [address] [place] [address] on 2/7/2025 at 9:30am.    Appointment was scheduled by Ms. EVANS Ellis, Referral Coordinator.

## 2025-01-30 NOTE — DISCHARGE NOTE PROVIDER - NSDCCPCAREPLAN_GEN_ALL_CORE_FT
PRINCIPAL DISCHARGE DIAGNOSIS  Diagnosis: Pneumonia  Assessment and Plan of Treatment: Pneumonia is an infection of the lungs. It is a serious illness that can affect people of any age, but it is most common and most dangerous in very young children, people older than 65, and people with underlying medical problems such as heart disease, diabetes, or chronic lung disease.  Common symptoms of pneumonia include fever, chills, shortness of breath, chest pain with breathing, a rapid heart and breathing rate, nausea, vomiting, diarrhea, and a cough that often produces green or yellow sputum (mucus from the lungs); occasionally, the sputum is rust colored. Most people have a fever (temperature greater than 100.5ºF or 38ºC), although this is less common in older adults. Shaking chills (called rigors) and a change in mental status (confusion, unclear thinking) can also occur.  We have been giving you antibiotics in the hospital which is helping you improve.  If you develop a fever, nausea, vomiting, or other symptoms listed above, please call your doctor.       PRINCIPAL DISCHARGE DIAGNOSIS  Diagnosis: Pneumonia  Assessment and Plan of Treatment: Pneumonia is an infection of the lungs. It is a serious illness that can affect people of any age, but it is most common and most dangerous in very young children, people older than 65, and people with underlying medical problems such as heart disease, diabetes, or chronic lung disease.  Common symptoms of pneumonia include fever, chills, shortness of breath, chest pain with breathing, a rapid heart and breathing rate, nausea, vomiting, diarrhea, and a cough that often produces green or yellow sputum (mucus from the lungs); occasionally, the sputum is rust colored. Most people have a fever (temperature greater than 100.5ºF or 38ºC), although this is less common in older adults. Shaking chills (called rigors) and a change in mental status (confusion, unclear thinking) can also occur.  We have been giving you antibiotics in the hospital which is helping you improve.  If you develop a fever, nausea, vomiting, or other symptoms listed above, please call your doctor.  Please take the oral antibiotic you were given at discharge as prescribed and please be sure to follow up with your primary care physician.

## 2025-01-30 NOTE — DISCHARGE NOTE PROVIDER - PROVIDER TOKENS
PROVIDER:[TOKEN:[[id number]],SCHEDULEDAPPT:[02/07/2025],SCHEDULEDAPPTTIME:[09:30 AM],ESTABLISHEDPATIENT:[T]]

## 2025-01-30 NOTE — DISCHARGE NOTE PROVIDER - NSDCFUSCHEDAPPT_GEN_ALL_CORE_FT
Won Ospina  [facility]  [facility] [address]  Scheduled Appointment: 02/06/2025

## 2025-01-30 NOTE — DISCHARGE NOTE NURSING/CASE MANAGEMENT/SOCIAL WORK - PATIENT PORTAL LINK FT
You can access the FollowMyHealth Patient Portal offered by [facility] by registering at the following website: [email or web address]. By joining [employer]’s FollowMyHealth portal, you will also be able to view your health information using other applications (apps) compatible with our system.

## 2025-01-30 NOTE — DISCHARGE NOTE PROVIDER - HOSPITAL COURSE
#Discharge: do not delete    89yoF with a PMHx of HTN, HLD presents for fever and chills, admitted for sepsis of unknown origin, found to have PNA.     Hospital Course:     Presented to ED 1/27 and admitted to medicine due severe sepsis of initially unclear etiology with no clear localizing infectious symptoms, started on vanc, zosyn with improvement symptomatically and resolved leukocytosis, remained HDS. Initially infectious workup negative (CXR, UA, RVP, blood Cx all negative, urine Cx with <50,000 CFU strep agal), so pan scanned with CT abd pelvis that was unremarkable, and CT chest with findings below that were overall concerning for pna, MRSA, staph A negative. Stable and ready for discharge on po abx as below.    Plan  - discharge on Levaquin 750 mg once daily for 3 days  - f/u with PCP    CT Chest and abd pelvis:   No acute pulmonary embolism. Signs of pulmonary arterial hypertension    Bronchiectasis in the middle lobe and lateral inguinal segment. Branching   tree-in-bud and nodular peripheral opacities in the middle lobe and left   lower lobe suggest mucoid impactions and infectious or inflammatory   peripheral airway disease and bronchiolitis.    Mosaic attenuation and air trapping raises concern for underlying small   airway and/or small vessel disease.    No acute CT morphologic evidence of an infectious or inflammatory focus   in the abdomen or pelvis.    Large stool burden suggesting chronic constipation. Correlate with   clinical parameters.    Hospital course (by problem):    ·  Problem: Severe sepsis.   ·  Plan: severe sepsis on admission, 4/4 SIRS (T101.6, , RR 20, WBC 18) with SBP drop >40mmHg in ED. unclear etiology at the moment. infectious w/u thus far negative, white count responsive to abx. will mendez scan with ct chest, ct ap.     plan  - pan scan as above  - c/w vanc, zosyn.    ·  Problem: Hyponatremia.   ·  Plan: Na 129 on admission. Of note, pt was here a few months ago for acute on chronic hyponatremia which was attributed to 2/2 HCTZ which was discontinued. Baseline Na appears to be ~128-130. Mentating well.   - likely siadh per urine lytes. TSH, cortisol normal recently per chart review. unclear etiology, but low Na appears chronic vs maybe underlying infectious/inflammatory process contributing    plan  - ctm.    ·  Problem: HTN (hypertension).   ·  Plan: Not on home meds. Initially hypertensive in ED >> borderline hypotensive, likely 2/2 sepsis.   - no interventions at this time.    ·  Problem: HLD (hyperlipidemia).   ·  Plan: - c/w home atorvastatin 40mg po qhs.    ·  Problem: Prophylactic measure.   ·  Plan: F: s/p 1.5L NS  E: replete prn  D: regular  DVT: lovenox  Code: full  Dispo: RMF.    Patient was discharged to: home    New medications: Levaquin 750 mg once daily for 3 days  Changes to old medications: none  Medications that were stopped: none    Items to follow up as outpatient: pcp    Physical exam at the time of discharge:    Constitutional: resting comfortably in bed; NAD  Head: NC/AT  Eyes: PERRL, EOMI, anicteric sclera  ENT: no nasal discharge; MMM  Neck: supple; no JVD or thyromegaly  Respiratory: CTA B/L; no W/R/R, no retractions  Cardiac: +S1/S2; RRR; no M/R/G  Gastrointestinal: abdomen soft, NT/ND; no rebound or guarding; +BSx4  Back: spine midline, no bony tenderness or step-offs; no CVAT B/L  Extremities: WWP, no clubbing or cyanosis; no peripheral edema  Musculoskeletal: NROM x4; no joint swelling, tenderness or erythema  Vascular: 2+ radial, DP/PT pulses B/L  Dermatologic: skin warm, dry and intact; no rashes, wounds, or scars  Lymphatic: no submandibular or cervical LAD  Neurologic: AAOx3; CNII-XII grossly intact; no focal deficits  Psychiatric: affect and characteristics of appearance, verbalizations, behaviors are appropriate #Discharge: do not delete    89yoF with a PMHx of HTN, HLD presents for fever and chills, admitted for sepsis of unknown origin, found to have PNA.     Hospital Course:     Presented to ED 1/27 and admitted to medicine due severe sepsis of initially unclear etiology with no clear localizing infectious symptoms, started on vanc, zosyn with improvement symptomatically and resolved leukocytosis, remained HDS. Initially infectious workup negative (CXR, UA, RVP, blood Cx all negative, urine Cx with <50,000 CFU strep agal), so pan scanned with CT abd pelvis that was unremarkable, and CT chest with findings below that were overall concerning for pna, MRSA, staph A negative. Stable and ready for discharge on po abx as below.      # Severe Sepsis   # Community Acquired Pneumonia   - discharge on Levaquin 750 mg once daily for 3 days  - f/u with PCP    CT Chest and abd pelvis:   No acute pulmonary embolism. Signs of pulmonary arterial hypertension    Bronchiectasis in the middle lobe and lateral inguinal segment. Branching   tree-in-bud and nodular peripheral opacities in the middle lobe and left   lower lobe suggest mucoid impactions and infectious or inflammatory   peripheral airway disease and bronchiolitis.    Mosaic attenuation and air trapping raises concern for underlying small   airway and/or small vessel disease.    No acute CT morphologic evidence of an infectious or inflammatory focus   in the abdomen or pelvis.    Large stool burden suggesting chronic constipation. Correlate with   clinical parameters.    # Hyponatremia due to SIADH , Resolved         New medications: Levaquin 750 mg once daily for 3 days  Changes to old medications: none  Medications that were stopped: none    Items to follow up as outpatient: pcp    Physical exam at the time of discharge:    Constitutional: resting comfortably in bed; NAD  Head: NC/AT  Eyes: PERRL, EOMI, anicteric sclera  ENT: no nasal discharge; MMM  Neck: supple; no JVD or thyromegaly  Respiratory: CTA B/L; no W/R/R, no retractions  Cardiac: +S1/S2; RRR; no M/R/G  Gastrointestinal: abdomen soft, NT/ND; no rebound or guarding; +BSx4  Back: spine midline, no bony tenderness or step-offs; no CVAT B/L  Extremities: WWP, no clubbing or cyanosis; no peripheral edema  Musculoskeletal: NROM x4; no joint swelling, tenderness or erythema  Vascular: 2+ radial, DP/PT pulses B/L  Dermatologic: skin warm, dry and intact; no rashes, wounds, or scars  Lymphatic: no submandibular or cervical LAD  Neurologic: AAOx3; CNII-XII grossly intact; no focal deficits  Psychiatric: affect and characteristics of appearance, verbalizations, behaviors are appropriate

## 2025-01-30 NOTE — DISCHARGE NOTE NURSING/CASE MANAGEMENT/SOCIAL WORK - FINANCIAL ASSISTANCE
[facility] provides services at a reduced cost to those who are determined to be eligible through [facility]’s financial assistance program. Information regarding [facility]’s financial assistance program can be found by going to [email or web address] or by calling [phone].

## 2025-01-30 NOTE — DISCHARGE NOTE PROVIDER - CARE PROVIDER_API CALL
[facility]  Internal Medicine  [address] [place] [id number]  Phone: [phone]  Fax: [phone]  Established Patient  Scheduled Appointment: 02/07/2025 09:30 AM

## 2025-01-30 NOTE — DISCHARGE NOTE PROVIDER - NSDCMRMEDTOKEN_GEN_ALL_CORE_FT
aspirin 81 mg oral delayed release tablet: 1 tab(s) orally once a day  atorvastatin 40 mg oral tablet: 1 tab(s) orally once a day (at bedtime)   aspirin 81 mg oral delayed release tablet: 1 tab(s) orally once a day  atorvastatin 40 mg oral tablet: 1 tab(s) orally once a day (at bedtime)  levoFLOXacin 750 mg oral tablet: 1 tab(s) orally once a day

## 2025-01-30 NOTE — DISCHARGE NOTE NURSING/CASE MANAGEMENT/SOCIAL WORK - NSDCPEFALRISK_GEN_ALL_CORE
not make this time there is swame day acess Monday throughThursday from 8am to 2pm and Friday from  8am to 1pm [address] [id number]  [phone]    lets get real  Follow up call for assistance with treatment options or general support for sobriety [address]  [place], [place]                Advanced Directive:   Does the patient have an appointed surrogate decision maker? No  Does the patient have a Medical Advance Directive? No  Does the patient have a Psychiatric Advance Directive? No  If the patient does not have a surrogate or Medical Advance Directive AND Psychiatric Advance Directive, the patient was offered information on these advance directives Patient declined to complete    Patient Instructions: Please continue all medications until otherwise directed by physician.      Tobacco Cessation Discharge Plan:   Is the patient a tobacco user  and needs referral for tobacco cessation? Yes  Patient referred to the following for tobacco cessation with an appointment? No, call 1800quitnow  Patient was offered medication to assist with tobacco cessation at discharge? Patient refused, call 1800quitnow    Alcohol/Substance Abuse Discharge Plan:   Does the patient have a history of substance/alcohol abuse and requires a referral for treatment? Yes  Patient referred to the following for substance/alcohol abuse treatment with an appointment? Yes, [facility]  Patient was offered medication to assist with substance/alcohol abuse cessation at discharge? Patient refused      Patient discharged to: Inpatient facility; Transition record discussed with patient/caregiver and provided this record in hard copy or electronically                     
For information on Fall & Injury Prevention, visit: [email or web address] OR  [email or web address] OR  https://www.cdc.gov/steadi/patient.html

## 2025-02-02 LAB
CULTURE RESULTS: SIGNIFICANT CHANGE UP
CULTURE RESULTS: SIGNIFICANT CHANGE UP
SPECIMEN SOURCE: SIGNIFICANT CHANGE UP
SPECIMEN SOURCE: SIGNIFICANT CHANGE UP

## 2025-02-05 DIAGNOSIS — E83.42 HYPOMAGNESEMIA: ICD-10-CM

## 2025-02-05 DIAGNOSIS — E22.2 SYNDROME OF INAPPROPRIATE SECRETION OF ANTIDIURETIC HORMONE: ICD-10-CM

## 2025-02-05 DIAGNOSIS — R65.20 SEVERE SEPSIS WITHOUT SEPTIC SHOCK: ICD-10-CM

## 2025-02-05 DIAGNOSIS — J18.9 PNEUMONIA, UNSPECIFIED ORGANISM: ICD-10-CM

## 2025-02-05 DIAGNOSIS — A41.9 SEPSIS, UNSPECIFIED ORGANISM: ICD-10-CM

## 2025-02-05 DIAGNOSIS — Z11.52 ENCOUNTER FOR SCREENING FOR COVID-19: ICD-10-CM

## 2025-02-05 DIAGNOSIS — J47.9 BRONCHIECTASIS, UNCOMPLICATED: ICD-10-CM

## 2025-02-05 DIAGNOSIS — I10 ESSENTIAL (PRIMARY) HYPERTENSION: ICD-10-CM

## 2025-02-05 DIAGNOSIS — Z79.82 LONG TERM (CURRENT) USE OF ASPIRIN: ICD-10-CM

## 2025-02-05 DIAGNOSIS — E78.5 HYPERLIPIDEMIA, UNSPECIFIED: ICD-10-CM

## 2025-02-06 ENCOUNTER — APPOINTMENT (OUTPATIENT)
Dept: OPHTHALMOLOGY | Facility: CLINIC | Age: 89
End: 2025-02-06
Payer: MEDICARE

## 2025-02-06 ENCOUNTER — NON-APPOINTMENT (OUTPATIENT)
Age: 89
End: 2025-02-06

## 2025-02-06 PROCEDURE — 92083 EXTENDED VISUAL FIELD XM: CPT

## 2025-02-06 PROCEDURE — 92250 FUNDUS PHOTOGRAPHY W/I&R: CPT

## 2025-02-06 PROCEDURE — 92014 COMPRE OPH EXAM EST PT 1/>: CPT

## 2025-02-06 PROCEDURE — 92020 GONIOSCOPY: CPT

## 2025-04-01 NOTE — DISCHARGE NOTE PROVIDER - NSDCMRMEDTOKEN_GEN_ALL_CORE_FT
Normal holter   ATENOLOL 50MG TAB:   Multiple Vitamins oral tablet: 1 orally once a day  Refresh Eye Itch Relief 0.025% ophthalmic solution: 1 in each affected eye once a day  VALSARTAN 80MG TAB:    aspirin 81 mg oral delayed release tablet: 1 tab(s) orally once a day  atenolol 25 mg oral tablet: 1 tab(s) orally once a day  atorvastatin 40 mg oral tablet: 1 tab(s) orally once a day (at bedtime)  Multiple Vitamins oral tablet: 1 orally once a day  NIFEdipine 30 mg oral tablet, extended release: 1 tab(s) orally once a day  Refresh Eye Itch Relief 0.025% ophthalmic solution: 1 in each affected eye once a day  valsartan 80 mg oral tablet: 3 tab(s) orally once a day

## 2025-06-25 NOTE — ED ADULT TRIAGE NOTE - NS ED NOTE AC HIGH RISK COUNTRIES
Called pt regarding NS today, 6/25/25. Spoke to pt and she stated she completely forgot and was actually in a meeting. Reminded of date and time of next appointment. Pt verbalized she would be there.  
No

## 2025-07-16 ENCOUNTER — APPOINTMENT (OUTPATIENT)
Dept: OPHTHALMOLOGY | Facility: CLINIC | Age: 89
End: 2025-07-16
Payer: MEDICARE

## 2025-07-16 ENCOUNTER — NON-APPOINTMENT (OUTPATIENT)
Age: 89
End: 2025-07-16

## 2025-07-16 PROCEDURE — 92133 CPTRZD OPH DX IMG PST SGM ON: CPT

## 2025-07-16 PROCEDURE — 92083 EXTENDED VISUAL FIELD XM: CPT

## 2025-07-16 PROCEDURE — 92012 INTRM OPH EXAM EST PATIENT: CPT
